# Patient Record
Sex: FEMALE | Race: BLACK OR AFRICAN AMERICAN | Employment: OTHER
[De-identification: names, ages, dates, MRNs, and addresses within clinical notes are randomized per-mention and may not be internally consistent; named-entity substitution may affect disease eponyms.]

---

## 2017-01-03 ENCOUNTER — TELEPHONE (OUTPATIENT)
Dept: ONCOLOGY | Facility: CLINIC | Age: 77
End: 2017-01-03

## 2017-01-10 ENCOUNTER — TELEPHONE (OUTPATIENT)
Dept: ONCOLOGY | Facility: CLINIC | Age: 77
End: 2017-01-10

## 2017-01-17 ENCOUNTER — OFFICE VISIT (OUTPATIENT)
Dept: ONCOLOGY | Facility: CLINIC | Age: 77
End: 2017-01-17

## 2017-01-17 VITALS
HEART RATE: 76 BPM | SYSTOLIC BLOOD PRESSURE: 161 MMHG | TEMPERATURE: 96.6 F | DIASTOLIC BLOOD PRESSURE: 87 MMHG | RESPIRATION RATE: 18 BRPM

## 2017-01-17 DIAGNOSIS — C50.412 MALIGNANT NEOPLASM OF UPPER-OUTER QUADRANT OF LEFT FEMALE BREAST (HCC): Primary | Chronic | ICD-10-CM

## 2017-01-17 PROCEDURE — 99214 OFFICE O/P EST MOD 30 MIN: CPT | Performed by: INTERNAL MEDICINE

## 2017-01-26 ENCOUNTER — TELEPHONE (OUTPATIENT)
Dept: ONCOLOGY | Facility: CLINIC | Age: 77
End: 2017-01-26

## 2017-03-14 ENCOUNTER — HOSPITAL ENCOUNTER (OUTPATIENT)
Facility: MEDICAL CENTER | Age: 77
Discharge: HOME | End: 2017-03-14
Payer: MEDICARE

## 2017-03-14 ENCOUNTER — OFFICE VISIT (OUTPATIENT)
Dept: ONCOLOGY | Age: 77
End: 2017-03-14
Payer: MEDICARE

## 2017-03-14 VITALS
WEIGHT: 162 LBS | SYSTOLIC BLOOD PRESSURE: 163 MMHG | HEART RATE: 66 BPM | DIASTOLIC BLOOD PRESSURE: 90 MMHG | TEMPERATURE: 96.8 F | BODY MASS INDEX: 32.72 KG/M2 | RESPIRATION RATE: 16 BRPM

## 2017-03-14 DIAGNOSIS — C50.412 MALIGNANT NEOPLASM OF UPPER-OUTER QUADRANT OF LEFT FEMALE BREAST (HCC): Primary | Chronic | ICD-10-CM

## 2017-03-14 DIAGNOSIS — Z72.0 TOBACCO ABUSE: Chronic | ICD-10-CM

## 2017-03-14 DIAGNOSIS — Z12.31 ENCOUNTER FOR SCREENING MAMMOGRAM FOR MALIGNANT NEOPLASM OF BREAST: ICD-10-CM

## 2017-03-14 PROCEDURE — 99213 OFFICE O/P EST LOW 20 MIN: CPT | Performed by: INTERNAL MEDICINE

## 2017-03-14 RX ORDER — VARENICLINE TARTRATE 25 MG
KIT ORAL
Qty: 1 EACH | Refills: 0 | Status: SHIPPED | OUTPATIENT
Start: 2017-03-14 | End: 2017-04-13

## 2017-04-13 ENCOUNTER — HOSPITAL ENCOUNTER (OUTPATIENT)
Dept: WOMENS IMAGING | Age: 77
Discharge: HOME | End: 2017-04-13
Payer: MEDICARE

## 2017-04-13 DIAGNOSIS — Z12.31 ENCOUNTER FOR SCREENING MAMMOGRAM FOR MALIGNANT NEOPLASM OF BREAST: ICD-10-CM

## 2017-04-13 DIAGNOSIS — C50.412 MALIGNANT NEOPLASM OF UPPER-OUTER QUADRANT OF LEFT FEMALE BREAST (HCC): Chronic | ICD-10-CM

## 2017-04-13 PROCEDURE — G0202 SCR MAMMO BI INCL CAD: HCPCS

## 2017-06-17 ENCOUNTER — APPOINTMENT (OUTPATIENT)
Dept: GENERAL RADIOLOGY | Age: 77
DRG: 603 | End: 2017-06-17
Payer: MEDICARE

## 2017-06-17 ENCOUNTER — HOSPITAL ENCOUNTER (INPATIENT)
Age: 77
LOS: 3 days | Discharge: HOME | DRG: 603 | End: 2017-06-21
Attending: EMERGENCY MEDICINE | Admitting: INTERNAL MEDICINE
Payer: MEDICARE

## 2017-06-17 DIAGNOSIS — L03.115 CELLULITIS OF RIGHT LOWER EXTREMITY: Primary | ICD-10-CM

## 2017-06-17 LAB
ABSOLUTE BANDS #: 2.39 K/UL (ref 0–1)
ABSOLUTE EOS #: 0 K/UL (ref 0–0.4)
ABSOLUTE LYMPH #: 0.74 K/UL (ref 1–4.8)
ABSOLUTE MONO #: 0.18 K/UL (ref 0.1–1.3)
ANION GAP SERPL CALCULATED.3IONS-SCNC: 16 MMOL/L (ref 9–17)
BANDS: 13 %
BASOPHILS # BLD: 0 %
BASOPHILS ABSOLUTE: 0 K/UL (ref 0–0.2)
BNP INTERPRETATION: ABNORMAL
BUN BLDV-MCNC: 32 MG/DL (ref 8–23)
BUN/CREAT BLD: ABNORMAL (ref 9–20)
CALCIUM SERPL-MCNC: 9 MG/DL (ref 8.6–10.4)
CHLORIDE BLD-SCNC: 97 MMOL/L (ref 98–107)
CO2: 22 MMOL/L (ref 20–31)
CREAT SERPL-MCNC: 1.78 MG/DL (ref 0.5–0.9)
DIFFERENTIAL TYPE: ABNORMAL
EOSINOPHILS RELATIVE PERCENT: 0 %
GFR AFRICAN AMERICAN: 33 ML/MIN
GFR NON-AFRICAN AMERICAN: 28 ML/MIN
GFR SERPL CREATININE-BSD FRML MDRD: ABNORMAL ML/MIN/{1.73_M2}
GFR SERPL CREATININE-BSD FRML MDRD: ABNORMAL ML/MIN/{1.73_M2}
GLUCOSE BLD-MCNC: 100 MG/DL (ref 70–99)
HCT VFR BLD CALC: 38 % (ref 36–46)
HEMOGLOBIN: 12.1 G/DL (ref 12–16)
INR BLD: 1.1
LYMPHOCYTES # BLD: 4 %
MCH RBC QN AUTO: 21.8 PG (ref 26–34)
MCHC RBC AUTO-ENTMCNC: 31.9 G/DL (ref 31–37)
MCV RBC AUTO: 68.5 FL (ref 80–100)
MONOCYTES # BLD: 1 %
MORPHOLOGY: ABNORMAL
PDW BLD-RTO: 18.3 % (ref 11.5–14.9)
PLATELET # BLD: 147 K/UL (ref 150–450)
PLATELET ESTIMATE: ABNORMAL
PMV BLD AUTO: 9.3 FL (ref 6–12)
POTASSIUM SERPL-SCNC: 4.2 MMOL/L (ref 3.7–5.3)
PRO-BNP: 2882 PG/ML
PROTHROMBIN TIME: 11.4 SEC (ref 9.7–12)
RBC # BLD: 5.54 M/UL (ref 4–5.2)
RBC # BLD: ABNORMAL 10*6/UL
SEG NEUTROPHILS: 82 %
SEGMENTED NEUTROPHILS ABSOLUTE COUNT: 15.09 K/UL (ref 1.3–9.1)
SODIUM BLD-SCNC: 135 MMOL/L (ref 135–144)
TROPONIN INTERP: NORMAL
TROPONIN T: <0.03 NG/ML
WBC # BLD: 18.4 K/UL (ref 3.5–11)
WBC # BLD: ABNORMAL 10*3/UL

## 2017-06-17 PROCEDURE — 80048 BASIC METABOLIC PNL TOTAL CA: CPT

## 2017-06-17 PROCEDURE — 87205 SMEAR GRAM STAIN: CPT

## 2017-06-17 PROCEDURE — 85025 COMPLETE CBC W/AUTO DIFF WBC: CPT

## 2017-06-17 PROCEDURE — 93005 ELECTROCARDIOGRAM TRACING: CPT

## 2017-06-17 PROCEDURE — 99285 EMERGENCY DEPT VISIT HI MDM: CPT

## 2017-06-17 PROCEDURE — 86403 PARTICLE AGGLUT ANTBDY SCRN: CPT

## 2017-06-17 PROCEDURE — 93971 EXTREMITY STUDY: CPT

## 2017-06-17 PROCEDURE — 36415 COLL VENOUS BLD VENIPUNCTURE: CPT

## 2017-06-17 PROCEDURE — 83880 ASSAY OF NATRIURETIC PEPTIDE: CPT

## 2017-06-17 PROCEDURE — 81001 URINALYSIS AUTO W/SCOPE: CPT

## 2017-06-17 PROCEDURE — 84484 ASSAY OF TROPONIN QUANT: CPT

## 2017-06-17 PROCEDURE — 71020 XR CHEST STANDARD TWO VW: CPT

## 2017-06-17 PROCEDURE — 85610 PROTHROMBIN TIME: CPT

## 2017-06-17 PROCEDURE — 87040 BLOOD CULTURE FOR BACTERIA: CPT

## 2017-06-17 ASSESSMENT — PAIN DESCRIPTION - ORIENTATION: ORIENTATION: RIGHT

## 2017-06-17 ASSESSMENT — ENCOUNTER SYMPTOMS
NAUSEA: 0
SHORTNESS OF BREATH: 0
SORE THROAT: 0
DIARRHEA: 0
COUGH: 0
EYE REDNESS: 0
RHINORRHEA: 0
VOMITING: 0
EYE DISCHARGE: 0
COLOR CHANGE: 0

## 2017-06-17 ASSESSMENT — PAIN SCALES - GENERAL: PAINLEVEL_OUTOF10: 5

## 2017-06-17 ASSESSMENT — PAIN DESCRIPTION - PAIN TYPE: TYPE: ACUTE PAIN

## 2017-06-17 ASSESSMENT — PAIN DESCRIPTION - LOCATION: LOCATION: LEG

## 2017-06-17 ASSESSMENT — PAIN SCALES - WONG BAKER: WONGBAKER_NUMERICALRESPONSE: 4

## 2017-06-18 LAB
-: ABNORMAL
AMORPHOUS: ABNORMAL
BACTERIA: ABNORMAL
BILIRUBIN URINE: NEGATIVE
CASTS UA: ABNORMAL /LPF
COLOR: YELLOW
COMMENT UA: ABNORMAL
CRYSTALS, UA: ABNORMAL /HPF
EPITHELIAL CELLS UA: ABNORMAL /HPF
GLUCOSE URINE: NEGATIVE
KETONES, URINE: NEGATIVE
LACTIC ACID: 2.6 MMOL/L (ref 0.5–2.2)
LEUKOCYTE ESTERASE, URINE: NEGATIVE
MUCUS: ABNORMAL
NITRITE, URINE: NEGATIVE
OTHER OBSERVATIONS UA: ABNORMAL
PH UA: 5.5 (ref 5–8)
PROTEIN UA: ABNORMAL
RBC UA: ABNORMAL /HPF
RENAL EPITHELIAL, UA: ABNORMAL /HPF
SPECIFIC GRAVITY UA: 1.02 (ref 1–1.03)
TRICHOMONAS: ABNORMAL
TROPONIN INTERP: NORMAL
TROPONIN T: <0.03 NG/ML
TURBIDITY: ABNORMAL
URINE HGB: ABNORMAL
UROBILINOGEN, URINE: NORMAL
WBC UA: ABNORMAL /HPF
YEAST: ABNORMAL

## 2017-06-18 PROCEDURE — 6360000002 HC RX W HCPCS: Performed by: INTERNAL MEDICINE

## 2017-06-18 PROCEDURE — 1200000000 HC SEMI PRIVATE

## 2017-06-18 PROCEDURE — 6360000002 HC RX W HCPCS: Performed by: EMERGENCY MEDICINE

## 2017-06-18 PROCEDURE — 83605 ASSAY OF LACTIC ACID: CPT

## 2017-06-18 PROCEDURE — 36415 COLL VENOUS BLD VENIPUNCTURE: CPT

## 2017-06-18 PROCEDURE — 84484 ASSAY OF TROPONIN QUANT: CPT

## 2017-06-18 PROCEDURE — 6370000000 HC RX 637 (ALT 250 FOR IP): Performed by: INTERNAL MEDICINE

## 2017-06-18 PROCEDURE — 2580000003 HC RX 258: Performed by: EMERGENCY MEDICINE

## 2017-06-18 PROCEDURE — 2580000003 HC RX 258: Performed by: INTERNAL MEDICINE

## 2017-06-18 PROCEDURE — 6370000000 HC RX 637 (ALT 250 FOR IP): Performed by: EMERGENCY MEDICINE

## 2017-06-18 PROCEDURE — 99223 1ST HOSP IP/OBS HIGH 75: CPT | Performed by: INTERNAL MEDICINE

## 2017-06-18 RX ORDER — ACETAMINOPHEN 500 MG
1000 TABLET ORAL ONCE
Status: COMPLETED | OUTPATIENT
Start: 2017-06-18 | End: 2017-06-18

## 2017-06-18 RX ORDER — SODIUM CHLORIDE 0.9 % (FLUSH) 0.9 %
10 SYRINGE (ML) INJECTION PRN
Status: DISCONTINUED | OUTPATIENT
Start: 2017-06-18 | End: 2017-06-21 | Stop reason: HOSPADM

## 2017-06-18 RX ORDER — HYDRALAZINE HYDROCHLORIDE 100 MG/1
100 TABLET, FILM COATED ORAL 3 TIMES DAILY
COMMUNITY
End: 2018-04-23 | Stop reason: SDUPTHER

## 2017-06-18 RX ORDER — DOCUSATE SODIUM 100 MG/1
100 CAPSULE, LIQUID FILLED ORAL 2 TIMES DAILY
Status: DISCONTINUED | OUTPATIENT
Start: 2017-06-18 | End: 2017-06-21 | Stop reason: HOSPADM

## 2017-06-18 RX ORDER — HYDRALAZINE HYDROCHLORIDE 50 MG/1
100 TABLET, FILM COATED ORAL 3 TIMES DAILY
Status: DISCONTINUED | OUTPATIENT
Start: 2017-06-18 | End: 2017-06-21 | Stop reason: HOSPADM

## 2017-06-18 RX ORDER — CARVEDILOL 25 MG/1
25 TABLET ORAL 2 TIMES DAILY WITH MEALS
COMMUNITY
End: 2018-07-16 | Stop reason: SDUPTHER

## 2017-06-18 RX ORDER — SIMVASTATIN 40 MG
40 TABLET ORAL NIGHTLY
COMMUNITY
End: 2019-01-01 | Stop reason: SDUPTHER

## 2017-06-18 RX ORDER — PANTOPRAZOLE SODIUM 40 MG/1
40 TABLET, DELAYED RELEASE ORAL DAILY
Status: DISCONTINUED | OUTPATIENT
Start: 2017-06-18 | End: 2017-06-21 | Stop reason: HOSPADM

## 2017-06-18 RX ORDER — MORPHINE SULFATE 2 MG/ML
1 INJECTION, SOLUTION INTRAMUSCULAR; INTRAVENOUS
Status: DISCONTINUED | OUTPATIENT
Start: 2017-06-18 | End: 2017-06-21 | Stop reason: HOSPADM

## 2017-06-18 RX ORDER — ACETAMINOPHEN 325 MG/1
650 TABLET ORAL EVERY 4 HOURS PRN
Status: DISCONTINUED | OUTPATIENT
Start: 2017-06-18 | End: 2017-06-21 | Stop reason: HOSPADM

## 2017-06-18 RX ORDER — PANTOPRAZOLE SODIUM 40 MG/1
40 TABLET, DELAYED RELEASE ORAL DAILY
Status: ON HOLD | COMMUNITY
End: 2017-06-21 | Stop reason: HOSPADM

## 2017-06-18 RX ORDER — CLOPIDOGREL BISULFATE 75 MG/1
75 TABLET ORAL DAILY
Status: DISCONTINUED | OUTPATIENT
Start: 2017-06-18 | End: 2017-06-21 | Stop reason: HOSPADM

## 2017-06-18 RX ORDER — CARVEDILOL 25 MG/1
25 TABLET ORAL 2 TIMES DAILY WITH MEALS
Status: DISCONTINUED | OUTPATIENT
Start: 2017-06-18 | End: 2017-06-21 | Stop reason: HOSPADM

## 2017-06-18 RX ORDER — SODIUM CHLORIDE 0.9 % (FLUSH) 0.9 %
10 SYRINGE (ML) INJECTION EVERY 12 HOURS SCHEDULED
Status: DISCONTINUED | OUTPATIENT
Start: 2017-06-18 | End: 2017-06-21 | Stop reason: HOSPADM

## 2017-06-18 RX ORDER — SIMVASTATIN 40 MG
40 TABLET ORAL NIGHTLY
Status: DISCONTINUED | OUTPATIENT
Start: 2017-06-18 | End: 2017-06-21 | Stop reason: HOSPADM

## 2017-06-18 RX ORDER — DOCUSATE SODIUM 100 MG/1
100 CAPSULE, LIQUID FILLED ORAL 2 TIMES DAILY
COMMUNITY
End: 2017-07-11 | Stop reason: ALTCHOICE

## 2017-06-18 RX ADMIN — Medication 10 ML: at 21:45

## 2017-06-18 RX ADMIN — ENOXAPARIN SODIUM 30 MG: 30 INJECTION SUBCUTANEOUS at 09:04

## 2017-06-18 RX ADMIN — HYDRALAZINE HYDROCHLORIDE 100 MG: 50 TABLET, FILM COATED ORAL at 16:18

## 2017-06-18 RX ADMIN — SIMVASTATIN 40 MG: 40 TABLET, FILM COATED ORAL at 21:45

## 2017-06-18 RX ADMIN — CLOPIDOGREL BISULFATE 75 MG: 75 TABLET ORAL at 16:18

## 2017-06-18 RX ADMIN — MORPHINE SULFATE 1 MG: 2 INJECTION, SOLUTION INTRAMUSCULAR; INTRAVENOUS at 10:25

## 2017-06-18 RX ADMIN — DOCUSATE SODIUM 100 MG: 100 CAPSULE, LIQUID FILLED ORAL at 21:45

## 2017-06-18 RX ADMIN — MORPHINE SULFATE 1 MG: 2 INJECTION, SOLUTION INTRAMUSCULAR; INTRAVENOUS at 16:35

## 2017-06-18 RX ADMIN — ACETAMINOPHEN 650 MG: 325 TABLET ORAL at 21:47

## 2017-06-18 RX ADMIN — VANCOMYCIN HYDROCHLORIDE 1000 MG: 1 INJECTION, POWDER, LYOPHILIZED, FOR SOLUTION INTRAVENOUS at 00:29

## 2017-06-18 RX ADMIN — HYDRALAZINE HYDROCHLORIDE 100 MG: 50 TABLET, FILM COATED ORAL at 21:45

## 2017-06-18 RX ADMIN — ACETAMINOPHEN 1000 MG: 500 TABLET ORAL at 01:20

## 2017-06-18 RX ADMIN — CARVEDILOL 25 MG: 25 TABLET, FILM COATED ORAL at 16:18

## 2017-06-18 RX ADMIN — Medication 10 ML: at 09:05

## 2017-06-18 RX ADMIN — PANTOPRAZOLE SODIUM 40 MG: 40 TABLET, DELAYED RELEASE ORAL at 16:18

## 2017-06-18 ASSESSMENT — PAIN DESCRIPTION - PAIN TYPE: TYPE: ACUTE PAIN

## 2017-06-18 ASSESSMENT — PAIN SCALES - GENERAL
PAINLEVEL_OUTOF10: 4
PAINLEVEL_OUTOF10: 10
PAINLEVEL_OUTOF10: 10
PAINLEVEL_OUTOF10: 0
PAINLEVEL_OUTOF10: 6

## 2017-06-18 ASSESSMENT — PAIN DESCRIPTION - LOCATION: LOCATION: LEG

## 2017-06-18 ASSESSMENT — PAIN DESCRIPTION - ORIENTATION: ORIENTATION: RIGHT;LOWER

## 2017-06-19 LAB
ANION GAP SERPL CALCULATED.3IONS-SCNC: 13 MMOL/L (ref 9–17)
BUN BLDV-MCNC: 25 MG/DL (ref 8–23)
BUN/CREAT BLD: ABNORMAL (ref 9–20)
CALCIUM SERPL-MCNC: 8.5 MG/DL (ref 8.6–10.4)
CHLORIDE BLD-SCNC: 102 MMOL/L (ref 98–107)
CO2: 22 MMOL/L (ref 20–31)
CREAT SERPL-MCNC: 1.22 MG/DL (ref 0.5–0.9)
GFR AFRICAN AMERICAN: 52 ML/MIN
GFR NON-AFRICAN AMERICAN: 43 ML/MIN
GFR SERPL CREATININE-BSD FRML MDRD: ABNORMAL ML/MIN/{1.73_M2}
GFR SERPL CREATININE-BSD FRML MDRD: ABNORMAL ML/MIN/{1.73_M2}
GLUCOSE BLD-MCNC: 89 MG/DL (ref 70–99)
HCT VFR BLD CALC: 33.7 % (ref 36–46)
HEMOGLOBIN: 10.6 G/DL (ref 12–16)
LACTIC ACID: 1.6 MMOL/L (ref 0.5–2.2)
MCH RBC QN AUTO: 21.4 PG (ref 26–34)
MCHC RBC AUTO-ENTMCNC: 31.3 G/DL (ref 31–37)
MCV RBC AUTO: 68.3 FL (ref 80–100)
PDW BLD-RTO: 17.8 % (ref 11.5–14.9)
PLATELET # BLD: 122 K/UL (ref 150–450)
PMV BLD AUTO: 9.1 FL (ref 6–12)
POTASSIUM SERPL-SCNC: 4.4 MMOL/L (ref 3.7–5.3)
RBC # BLD: 4.94 M/UL (ref 4–5.2)
SODIUM BLD-SCNC: 137 MMOL/L (ref 135–144)
WBC # BLD: 11.8 K/UL (ref 3.5–11)

## 2017-06-19 PROCEDURE — 80048 BASIC METABOLIC PNL TOTAL CA: CPT

## 2017-06-19 PROCEDURE — 2580000003 HC RX 258: Performed by: INTERNAL MEDICINE

## 2017-06-19 PROCEDURE — 36415 COLL VENOUS BLD VENIPUNCTURE: CPT

## 2017-06-19 PROCEDURE — 6370000000 HC RX 637 (ALT 250 FOR IP): Performed by: INTERNAL MEDICINE

## 2017-06-19 PROCEDURE — 99232 SBSQ HOSP IP/OBS MODERATE 35: CPT | Performed by: INTERNAL MEDICINE

## 2017-06-19 PROCEDURE — 83605 ASSAY OF LACTIC ACID: CPT

## 2017-06-19 PROCEDURE — 1200000000 HC SEMI PRIVATE

## 2017-06-19 PROCEDURE — 85027 COMPLETE CBC AUTOMATED: CPT

## 2017-06-19 PROCEDURE — 6360000002 HC RX W HCPCS: Performed by: EMERGENCY MEDICINE

## 2017-06-19 PROCEDURE — 2580000003 HC RX 258: Performed by: EMERGENCY MEDICINE

## 2017-06-19 RX ORDER — HYDROCODONE BITARTRATE AND ACETAMINOPHEN 5; 325 MG/1; MG/1
1 TABLET ORAL EVERY 4 HOURS PRN
Status: DISCONTINUED | OUTPATIENT
Start: 2017-06-19 | End: 2017-06-21 | Stop reason: HOSPADM

## 2017-06-19 RX ADMIN — HYDRALAZINE HYDROCHLORIDE 100 MG: 50 TABLET, FILM COATED ORAL at 09:47

## 2017-06-19 RX ADMIN — CARVEDILOL 25 MG: 25 TABLET, FILM COATED ORAL at 09:45

## 2017-06-19 RX ADMIN — Medication 10 ML: at 20:54

## 2017-06-19 RX ADMIN — Medication 10 ML: at 09:46

## 2017-06-19 RX ADMIN — HYDROCODONE BITARTRATE AND ACETAMINOPHEN 1 TABLET: 5; 325 TABLET ORAL at 16:50

## 2017-06-19 RX ADMIN — ACETAMINOPHEN 650 MG: 325 TABLET ORAL at 10:06

## 2017-06-19 RX ADMIN — CLOPIDOGREL BISULFATE 75 MG: 75 TABLET ORAL at 09:45

## 2017-06-19 RX ADMIN — HYDRALAZINE HYDROCHLORIDE 100 MG: 50 TABLET, FILM COATED ORAL at 20:53

## 2017-06-19 RX ADMIN — PANTOPRAZOLE SODIUM 40 MG: 40 TABLET, DELAYED RELEASE ORAL at 09:45

## 2017-06-19 RX ADMIN — DOCUSATE SODIUM 100 MG: 100 CAPSULE, LIQUID FILLED ORAL at 20:54

## 2017-06-19 RX ADMIN — SIMVASTATIN 40 MG: 40 TABLET, FILM COATED ORAL at 20:53

## 2017-06-19 RX ADMIN — CARVEDILOL 25 MG: 25 TABLET, FILM COATED ORAL at 16:49

## 2017-06-19 RX ADMIN — DOCUSATE SODIUM 100 MG: 100 CAPSULE, LIQUID FILLED ORAL at 09:45

## 2017-06-19 RX ADMIN — VANCOMYCIN HYDROCHLORIDE 1000 MG: 1 INJECTION, POWDER, LYOPHILIZED, FOR SOLUTION INTRAVENOUS at 12:47

## 2017-06-19 ASSESSMENT — PAIN DESCRIPTION - ORIENTATION
ORIENTATION: RIGHT
ORIENTATION: RIGHT

## 2017-06-19 ASSESSMENT — PAIN DESCRIPTION - LOCATION
LOCATION: LEG
LOCATION: LEG

## 2017-06-19 ASSESSMENT — PAIN DESCRIPTION - PAIN TYPE
TYPE: CHRONIC PAIN
TYPE: ACUTE PAIN;CHRONIC PAIN

## 2017-06-19 ASSESSMENT — PAIN SCALES - GENERAL
PAINLEVEL_OUTOF10: 10
PAINLEVEL_OUTOF10: 10
PAINLEVEL_OUTOF10: 0
PAINLEVEL_OUTOF10: 5

## 2017-06-20 LAB
ANION GAP SERPL CALCULATED.3IONS-SCNC: 14 MMOL/L (ref 9–17)
BUN BLDV-MCNC: 19 MG/DL (ref 8–23)
BUN/CREAT BLD: ABNORMAL (ref 9–20)
CALCIUM SERPL-MCNC: 8.2 MG/DL (ref 8.6–10.4)
CHLORIDE BLD-SCNC: 103 MMOL/L (ref 98–107)
CO2: 21 MMOL/L (ref 20–31)
CREAT SERPL-MCNC: 1.15 MG/DL (ref 0.5–0.9)
CULTURE: ABNORMAL
GFR AFRICAN AMERICAN: 55 ML/MIN
GFR NON-AFRICAN AMERICAN: 46 ML/MIN
GFR SERPL CREATININE-BSD FRML MDRD: ABNORMAL ML/MIN/{1.73_M2}
GFR SERPL CREATININE-BSD FRML MDRD: ABNORMAL ML/MIN/{1.73_M2}
GLUCOSE BLD-MCNC: 94 MG/DL (ref 70–99)
HCT VFR BLD CALC: 30.5 % (ref 36–46)
HEMOGLOBIN: 9.6 G/DL (ref 12–16)
Lab: ABNORMAL
Lab: ABNORMAL
MCH RBC QN AUTO: 21.5 PG (ref 26–34)
MCHC RBC AUTO-ENTMCNC: 31.6 G/DL (ref 31–37)
MCV RBC AUTO: 67.9 FL (ref 80–100)
PDW BLD-RTO: 18 % (ref 11.5–14.9)
PLATELET # BLD: 132 K/UL (ref 150–450)
PMV BLD AUTO: 9.5 FL (ref 6–12)
POTASSIUM SERPL-SCNC: 3.5 MMOL/L (ref 3.7–5.3)
RBC # BLD: 4.49 M/UL (ref 4–5.2)
SODIUM BLD-SCNC: 138 MMOL/L (ref 135–144)
SPECIMEN DESCRIPTION: ABNORMAL
SPECIMEN DESCRIPTION: ABNORMAL
STATUS: ABNORMAL
WBC # BLD: 8.5 K/UL (ref 3.5–11)

## 2017-06-20 PROCEDURE — 1200000000 HC SEMI PRIVATE

## 2017-06-20 PROCEDURE — 6370000000 HC RX 637 (ALT 250 FOR IP): Performed by: INTERNAL MEDICINE

## 2017-06-20 PROCEDURE — 2580000003 HC RX 258: Performed by: INTERNAL MEDICINE

## 2017-06-20 PROCEDURE — 36415 COLL VENOUS BLD VENIPUNCTURE: CPT

## 2017-06-20 PROCEDURE — 99232 SBSQ HOSP IP/OBS MODERATE 35: CPT | Performed by: INTERNAL MEDICINE

## 2017-06-20 PROCEDURE — 85027 COMPLETE CBC AUTOMATED: CPT

## 2017-06-20 PROCEDURE — 80048 BASIC METABOLIC PNL TOTAL CA: CPT

## 2017-06-20 RX ADMIN — HYDRALAZINE HYDROCHLORIDE 100 MG: 50 TABLET, FILM COATED ORAL at 09:31

## 2017-06-20 RX ADMIN — DOCUSATE SODIUM 100 MG: 100 CAPSULE, LIQUID FILLED ORAL at 09:31

## 2017-06-20 RX ADMIN — CARVEDILOL 25 MG: 25 TABLET, FILM COATED ORAL at 17:58

## 2017-06-20 RX ADMIN — SIMVASTATIN 40 MG: 40 TABLET, FILM COATED ORAL at 23:15

## 2017-06-20 RX ADMIN — Medication 10 ML: at 23:15

## 2017-06-20 RX ADMIN — CARVEDILOL 25 MG: 25 TABLET, FILM COATED ORAL at 09:31

## 2017-06-20 RX ADMIN — Medication 10 ML: at 09:32

## 2017-06-20 RX ADMIN — CLOPIDOGREL BISULFATE 75 MG: 75 TABLET ORAL at 09:31

## 2017-06-20 RX ADMIN — HYDROCODONE BITARTRATE AND ACETAMINOPHEN 1 TABLET: 5; 325 TABLET ORAL at 06:41

## 2017-06-20 RX ADMIN — HYDRALAZINE HYDROCHLORIDE 100 MG: 50 TABLET, FILM COATED ORAL at 23:15

## 2017-06-20 RX ADMIN — HYDROCODONE BITARTRATE AND ACETAMINOPHEN 1 TABLET: 5; 325 TABLET ORAL at 14:16

## 2017-06-20 RX ADMIN — HYDRALAZINE HYDROCHLORIDE 100 MG: 50 TABLET, FILM COATED ORAL at 14:12

## 2017-06-20 RX ADMIN — HYDROCODONE BITARTRATE AND ACETAMINOPHEN 1 TABLET: 5; 325 TABLET ORAL at 00:15

## 2017-06-20 RX ADMIN — PANTOPRAZOLE SODIUM 40 MG: 40 TABLET, DELAYED RELEASE ORAL at 09:31

## 2017-06-20 ASSESSMENT — PAIN SCALES - GENERAL
PAINLEVEL_OUTOF10: 7
PAINLEVEL_OUTOF10: 0
PAINLEVEL_OUTOF10: 8
PAINLEVEL_OUTOF10: 10
PAINLEVEL_OUTOF10: 5
PAINLEVEL_OUTOF10: 10

## 2017-06-21 VITALS
HEIGHT: 60 IN | DIASTOLIC BLOOD PRESSURE: 56 MMHG | HEART RATE: 63 BPM | SYSTOLIC BLOOD PRESSURE: 134 MMHG | RESPIRATION RATE: 20 BRPM | OXYGEN SATURATION: 100 % | TEMPERATURE: 98.6 F | BODY MASS INDEX: 31.34 KG/M2 | WEIGHT: 159.61 LBS

## 2017-06-21 LAB
ANION GAP SERPL CALCULATED.3IONS-SCNC: 12 MMOL/L (ref 9–17)
BUN BLDV-MCNC: 16 MG/DL (ref 8–23)
BUN/CREAT BLD: ABNORMAL (ref 9–20)
CALCIUM SERPL-MCNC: 8.2 MG/DL (ref 8.6–10.4)
CHLORIDE BLD-SCNC: 102 MMOL/L (ref 98–107)
CO2: 23 MMOL/L (ref 20–31)
CREAT SERPL-MCNC: 1 MG/DL (ref 0.5–0.9)
GFR AFRICAN AMERICAN: >60 ML/MIN
GFR NON-AFRICAN AMERICAN: 54 ML/MIN
GFR SERPL CREATININE-BSD FRML MDRD: ABNORMAL ML/MIN/{1.73_M2}
GFR SERPL CREATININE-BSD FRML MDRD: ABNORMAL ML/MIN/{1.73_M2}
GLUCOSE BLD-MCNC: 102 MG/DL (ref 70–99)
HCT VFR BLD CALC: 30.6 % (ref 36–46)
HEMOGLOBIN: 10 G/DL (ref 12–16)
MCH RBC QN AUTO: 22.3 PG (ref 26–34)
MCHC RBC AUTO-ENTMCNC: 32.6 G/DL (ref 31–37)
MCV RBC AUTO: 68.3 FL (ref 80–100)
PDW BLD-RTO: 18 % (ref 11.5–14.9)
PLATELET # BLD: 149 K/UL (ref 150–450)
PMV BLD AUTO: 9.4 FL (ref 6–12)
POTASSIUM SERPL-SCNC: 3.6 MMOL/L (ref 3.7–5.3)
RBC # BLD: 4.48 M/UL (ref 4–5.2)
SODIUM BLD-SCNC: 137 MMOL/L (ref 135–144)
VANCOMYCIN TROUGH DATE LAST DOSE: ABNORMAL
VANCOMYCIN TROUGH DOSE AMOUNT: ABNORMAL
VANCOMYCIN TROUGH TIME LAST DOSE: 1247
VANCOMYCIN TROUGH: 8.1 UG/ML (ref 10–20)
WBC # BLD: 6.2 K/UL (ref 3.5–11)

## 2017-06-21 PROCEDURE — 36415 COLL VENOUS BLD VENIPUNCTURE: CPT

## 2017-06-21 PROCEDURE — 6370000000 HC RX 637 (ALT 250 FOR IP): Performed by: INTERNAL MEDICINE

## 2017-06-21 PROCEDURE — 6360000002 HC RX W HCPCS: Performed by: EMERGENCY MEDICINE

## 2017-06-21 PROCEDURE — 80048 BASIC METABOLIC PNL TOTAL CA: CPT

## 2017-06-21 PROCEDURE — 85027 COMPLETE CBC AUTOMATED: CPT

## 2017-06-21 PROCEDURE — 80202 ASSAY OF VANCOMYCIN: CPT

## 2017-06-21 PROCEDURE — 2580000003 HC RX 258: Performed by: EMERGENCY MEDICINE

## 2017-06-21 PROCEDURE — 99239 HOSP IP/OBS DSCHRG MGMT >30: CPT | Performed by: INTERNAL MEDICINE

## 2017-06-21 PROCEDURE — 2580000003 HC RX 258: Performed by: INTERNAL MEDICINE

## 2017-06-21 RX ORDER — PANTOPRAZOLE SODIUM 40 MG/1
40 TABLET, DELAYED RELEASE ORAL DAILY
Qty: 30 TABLET | Refills: 3 | Status: SHIPPED | OUTPATIENT
Start: 2017-06-21 | End: 2017-06-21

## 2017-06-21 RX ORDER — DOXYCYCLINE HYCLATE 100 MG
100 TABLET ORAL 2 TIMES DAILY
Qty: 20 TABLET | Refills: 0 | Status: ON HOLD | OUTPATIENT
Start: 2017-06-21 | End: 2017-07-04 | Stop reason: HOSPADM

## 2017-06-21 RX ORDER — DOXYCYCLINE HYCLATE 100 MG
100 TABLET ORAL 2 TIMES DAILY
Qty: 20 TABLET | Refills: 0 | Status: SHIPPED | OUTPATIENT
Start: 2017-06-21 | End: 2017-06-21

## 2017-06-21 RX ORDER — PANTOPRAZOLE SODIUM 40 MG/1
40 TABLET, DELAYED RELEASE ORAL DAILY
Qty: 30 TABLET | Refills: 3 | Status: SHIPPED | OUTPATIENT
Start: 2017-06-21 | End: 2018-04-23 | Stop reason: SDUPTHER

## 2017-06-21 RX ORDER — HYDROCODONE BITARTRATE AND ACETAMINOPHEN 5; 325 MG/1; MG/1
1 TABLET ORAL EVERY 6 HOURS PRN
Qty: 15 TABLET | Refills: 0 | Status: SHIPPED | OUTPATIENT
Start: 2017-06-21 | End: 2017-06-26

## 2017-06-21 RX ADMIN — PANTOPRAZOLE SODIUM 40 MG: 40 TABLET, DELAYED RELEASE ORAL at 08:51

## 2017-06-21 RX ADMIN — ACETAMINOPHEN 325 MG: 325 TABLET ORAL at 08:51

## 2017-06-21 RX ADMIN — ENOXAPARIN SODIUM 30 MG: 30 INJECTION SUBCUTANEOUS at 08:53

## 2017-06-21 RX ADMIN — Medication 10 ML: at 08:55

## 2017-06-21 RX ADMIN — CLOPIDOGREL BISULFATE 75 MG: 75 TABLET ORAL at 08:51

## 2017-06-21 RX ADMIN — DEXTROSE MONOHYDRATE 1000 MG: 50 INJECTION, SOLUTION INTRAVENOUS at 01:30

## 2017-06-21 RX ADMIN — HYDROCODONE BITARTRATE AND ACETAMINOPHEN 1 TABLET: 5; 325 TABLET ORAL at 08:52

## 2017-06-21 RX ADMIN — CARVEDILOL 25 MG: 25 TABLET, FILM COATED ORAL at 08:55

## 2017-06-21 RX ADMIN — HYDRALAZINE HYDROCHLORIDE 100 MG: 50 TABLET, FILM COATED ORAL at 08:51

## 2017-06-21 ASSESSMENT — PAIN SCALES - GENERAL: PAINLEVEL_OUTOF10: 6

## 2017-06-22 LAB
EKG ATRIAL RATE: 95 BPM
EKG P AXIS: 64 DEGREES
EKG P-R INTERVAL: 176 MS
EKG Q-T INTERVAL: 354 MS
EKG QRS DURATION: 72 MS
EKG QTC CALCULATION (BAZETT): 444 MS
EKG R AXIS: 15 DEGREES
EKG T AXIS: 57 DEGREES
EKG VENTRICULAR RATE: 95 BPM

## 2017-06-23 LAB
CULTURE: NORMAL
CULTURE: NORMAL
Lab: NORMAL
Lab: NORMAL
SPECIMEN DESCRIPTION: NORMAL
SPECIMEN DESCRIPTION: NORMAL
STATUS: NORMAL

## 2017-06-28 ENCOUNTER — HOSPITAL ENCOUNTER (INPATIENT)
Age: 77
LOS: 6 days | Discharge: HOME HEALTH | DRG: 603 | End: 2017-07-04
Attending: EMERGENCY MEDICINE | Admitting: FAMILY MEDICINE
Payer: MEDICARE

## 2017-06-28 DIAGNOSIS — L03.90 CELLULITIS, UNSPECIFIED CELLULITIS SITE: Primary | ICD-10-CM

## 2017-06-28 PROBLEM — L03.115 CELLULITIS OF RIGHT LOWER EXTREMITY: Status: ACTIVE | Noted: 2017-06-28

## 2017-06-28 PROBLEM — D64.9 ANEMIA: Status: ACTIVE | Noted: 2017-06-28

## 2017-06-28 LAB
ABSOLUTE EOS #: 0.15 K/UL (ref 0–0.4)
ABSOLUTE LYMPH #: 1.26 K/UL (ref 1–4.8)
ABSOLUTE MONO #: 0.52 K/UL (ref 0.1–1.3)
ANION GAP SERPL CALCULATED.3IONS-SCNC: 12 MMOL/L (ref 9–17)
BASOPHILS # BLD: 0 %
BASOPHILS ABSOLUTE: 0 K/UL (ref 0–0.2)
BUN BLDV-MCNC: 16 MG/DL (ref 8–23)
BUN/CREAT BLD: ABNORMAL (ref 9–20)
CALCIUM SERPL-MCNC: 8.8 MG/DL (ref 8.6–10.4)
CHLORIDE BLD-SCNC: 105 MMOL/L (ref 98–107)
CO2: 24 MMOL/L (ref 20–31)
CREAT SERPL-MCNC: 1.1 MG/DL (ref 0.5–0.9)
DIFFERENTIAL TYPE: ABNORMAL
EOSINOPHILS RELATIVE PERCENT: 2 %
GFR AFRICAN AMERICAN: 58 ML/MIN
GFR NON-AFRICAN AMERICAN: 48 ML/MIN
GFR SERPL CREATININE-BSD FRML MDRD: ABNORMAL ML/MIN/{1.73_M2}
GFR SERPL CREATININE-BSD FRML MDRD: ABNORMAL ML/MIN/{1.73_M2}
GLUCOSE BLD-MCNC: 108 MG/DL (ref 70–99)
HCT VFR BLD CALC: 31.3 % (ref 36–46)
HEMOGLOBIN: 10 G/DL (ref 12–16)
LYMPHOCYTES # BLD: 17 %
MCH RBC QN AUTO: 21.6 PG (ref 26–34)
MCHC RBC AUTO-ENTMCNC: 31.9 G/DL (ref 31–37)
MCV RBC AUTO: 67.5 FL (ref 80–100)
MONOCYTES # BLD: 7 %
MORPHOLOGY: ABNORMAL
PDW BLD-RTO: 17.2 % (ref 11.5–14.9)
PLATELET # BLD: 460 K/UL (ref 150–450)
PLATELET ESTIMATE: ABNORMAL
PMV BLD AUTO: 8.3 FL (ref 6–12)
POTASSIUM SERPL-SCNC: 4.8 MMOL/L (ref 3.7–5.3)
RBC # BLD: 4.64 M/UL (ref 4–5.2)
RBC # BLD: ABNORMAL 10*6/UL
SEG NEUTROPHILS: 74 %
SEGMENTED NEUTROPHILS ABSOLUTE COUNT: 5.47 K/UL (ref 1.3–9.1)
SODIUM BLD-SCNC: 141 MMOL/L (ref 135–144)
WBC # BLD: 7.4 K/UL (ref 3.5–11)
WBC # BLD: ABNORMAL 10*3/UL

## 2017-06-28 PROCEDURE — 99284 EMERGENCY DEPT VISIT MOD MDM: CPT

## 2017-06-28 PROCEDURE — 6370000000 HC RX 637 (ALT 250 FOR IP): Performed by: FAMILY MEDICINE

## 2017-06-28 PROCEDURE — 80048 BASIC METABOLIC PNL TOTAL CA: CPT

## 2017-06-28 PROCEDURE — 2580000003 HC RX 258: Performed by: FAMILY MEDICINE

## 2017-06-28 PROCEDURE — 6360000002 HC RX W HCPCS: Performed by: EMERGENCY MEDICINE

## 2017-06-28 PROCEDURE — 96365 THER/PROPH/DIAG IV INF INIT: CPT

## 2017-06-28 PROCEDURE — 6360000002 HC RX W HCPCS: Performed by: FAMILY MEDICINE

## 2017-06-28 PROCEDURE — 96375 TX/PRO/DX INJ NEW DRUG ADDON: CPT

## 2017-06-28 PROCEDURE — 85025 COMPLETE CBC W/AUTO DIFF WBC: CPT

## 2017-06-28 PROCEDURE — 2580000003 HC RX 258: Performed by: EMERGENCY MEDICINE

## 2017-06-28 PROCEDURE — 36415 COLL VENOUS BLD VENIPUNCTURE: CPT

## 2017-06-28 PROCEDURE — G0378 HOSPITAL OBSERVATION PER HR: HCPCS

## 2017-06-28 PROCEDURE — 1200000000 HC SEMI PRIVATE

## 2017-06-28 RX ORDER — HYDRALAZINE HYDROCHLORIDE 50 MG/1
100 TABLET, FILM COATED ORAL 3 TIMES DAILY
Status: DISCONTINUED | OUTPATIENT
Start: 2017-06-28 | End: 2017-07-04 | Stop reason: HOSPADM

## 2017-06-28 RX ORDER — ACETAMINOPHEN AND CODEINE PHOSPHATE 120; 12 MG/5ML; MG/5ML
10 SOLUTION ORAL EVERY 8 HOURS PRN
Status: DISCONTINUED | OUTPATIENT
Start: 2017-06-28 | End: 2017-07-04 | Stop reason: HOSPADM

## 2017-06-28 RX ORDER — MORPHINE SULFATE 4 MG/ML
4 INJECTION, SOLUTION INTRAMUSCULAR; INTRAVENOUS ONCE
Status: COMPLETED | OUTPATIENT
Start: 2017-06-28 | End: 2017-06-28

## 2017-06-28 RX ORDER — PANTOPRAZOLE SODIUM 40 MG/1
40 TABLET, DELAYED RELEASE ORAL DAILY
Status: DISCONTINUED | OUTPATIENT
Start: 2017-06-28 | End: 2017-07-04 | Stop reason: HOSPADM

## 2017-06-28 RX ORDER — SODIUM CHLORIDE 0.9 % (FLUSH) 0.9 %
10 SYRINGE (ML) INJECTION PRN
Status: DISCONTINUED | OUTPATIENT
Start: 2017-06-28 | End: 2017-07-04 | Stop reason: HOSPADM

## 2017-06-28 RX ORDER — SODIUM CHLORIDE 0.9 % (FLUSH) 0.9 %
10 SYRINGE (ML) INJECTION EVERY 12 HOURS SCHEDULED
Status: DISCONTINUED | OUTPATIENT
Start: 2017-06-28 | End: 2017-07-04 | Stop reason: HOSPADM

## 2017-06-28 RX ORDER — CLOPIDOGREL BISULFATE 75 MG/1
75 TABLET ORAL DAILY
Status: DISCONTINUED | OUTPATIENT
Start: 2017-06-28 | End: 2017-07-04 | Stop reason: HOSPADM

## 2017-06-28 RX ORDER — SIMVASTATIN 40 MG
40 TABLET ORAL NIGHTLY
Status: DISCONTINUED | OUTPATIENT
Start: 2017-06-28 | End: 2017-07-04 | Stop reason: HOSPADM

## 2017-06-28 RX ORDER — DOCUSATE SODIUM 100 MG/1
100 CAPSULE, LIQUID FILLED ORAL 2 TIMES DAILY
Status: DISCONTINUED | OUTPATIENT
Start: 2017-06-28 | End: 2017-07-04 | Stop reason: HOSPADM

## 2017-06-28 RX ORDER — FUROSEMIDE 20 MG/1
20 TABLET ORAL 2 TIMES DAILY
Status: DISCONTINUED | OUTPATIENT
Start: 2017-06-28 | End: 2017-07-04 | Stop reason: HOSPADM

## 2017-06-28 RX ORDER — OXYCODONE HYDROCHLORIDE AND ACETAMINOPHEN 5; 325 MG/1; MG/1
1 TABLET ORAL EVERY 4 HOURS PRN
Status: DISCONTINUED | OUTPATIENT
Start: 2017-06-28 | End: 2017-07-04 | Stop reason: HOSPADM

## 2017-06-28 RX ORDER — OXYCODONE HYDROCHLORIDE AND ACETAMINOPHEN 5; 325 MG/1; MG/1
2 TABLET ORAL EVERY 4 HOURS PRN
Status: DISCONTINUED | OUTPATIENT
Start: 2017-06-28 | End: 2017-07-04 | Stop reason: HOSPADM

## 2017-06-28 RX ORDER — CARVEDILOL 25 MG/1
25 TABLET ORAL 2 TIMES DAILY WITH MEALS
Status: DISCONTINUED | OUTPATIENT
Start: 2017-06-28 | End: 2017-07-04 | Stop reason: HOSPADM

## 2017-06-28 RX ORDER — ACETAMINOPHEN 325 MG/1
650 TABLET ORAL EVERY 4 HOURS PRN
Status: DISCONTINUED | OUTPATIENT
Start: 2017-06-28 | End: 2017-07-04 | Stop reason: HOSPADM

## 2017-06-28 RX ORDER — ONDANSETRON 2 MG/ML
4 INJECTION INTRAMUSCULAR; INTRAVENOUS EVERY 4 HOURS PRN
Status: DISCONTINUED | OUTPATIENT
Start: 2017-06-28 | End: 2017-07-04 | Stop reason: HOSPADM

## 2017-06-28 RX ADMIN — CLOPIDOGREL BISULFATE 75 MG: 75 TABLET ORAL at 18:14

## 2017-06-28 RX ADMIN — VANCOMYCIN HYDROCHLORIDE 1000 MG: 1 INJECTION, POWDER, LYOPHILIZED, FOR SOLUTION INTRAVENOUS at 14:00

## 2017-06-28 RX ADMIN — DOCUSATE SODIUM 100 MG: 100 CAPSULE, LIQUID FILLED ORAL at 20:06

## 2017-06-28 RX ADMIN — HYDRALAZINE HYDROCHLORIDE 100 MG: 50 TABLET, FILM COATED ORAL at 20:06

## 2017-06-28 RX ADMIN — FUROSEMIDE 20 MG: 20 TABLET ORAL at 20:06

## 2017-06-28 RX ADMIN — Medication 10 ML: at 20:13

## 2017-06-28 RX ADMIN — CARVEDILOL 25 MG: 25 TABLET, FILM COATED ORAL at 18:14

## 2017-06-28 RX ADMIN — MORPHINE SULFATE 4 MG: 4 INJECTION, SOLUTION INTRAMUSCULAR; INTRAVENOUS at 12:51

## 2017-06-28 RX ADMIN — ENOXAPARIN SODIUM 40 MG: 40 INJECTION SUBCUTANEOUS at 17:21

## 2017-06-28 RX ADMIN — SIMVASTATIN 40 MG: 40 TABLET, FILM COATED ORAL at 20:06

## 2017-06-28 RX ADMIN — OXYCODONE HYDROCHLORIDE AND ACETAMINOPHEN 2 TABLET: 5; 325 TABLET ORAL at 17:19

## 2017-06-28 RX ADMIN — PANTOPRAZOLE SODIUM 40 MG: 40 TABLET, DELAYED RELEASE ORAL at 18:14

## 2017-06-28 ASSESSMENT — ENCOUNTER SYMPTOMS
EYE PAIN: 0
EYE DISCHARGE: 0
NAUSEA: 0
TROUBLE SWALLOWING: 0
VOMITING: 0
COUGH: 0
ABDOMINAL PAIN: 0
SHORTNESS OF BREATH: 0
PHOTOPHOBIA: 0
DIARRHEA: 0
RHINORRHEA: 0
SORE THROAT: 0

## 2017-06-28 ASSESSMENT — PAIN DESCRIPTION - DESCRIPTORS
DESCRIPTORS: BURNING;THROBBING;TIGHTNESS
DESCRIPTORS: BURNING;THROBBING

## 2017-06-28 ASSESSMENT — PAIN SCALES - GENERAL
PAINLEVEL_OUTOF10: 10
PAINLEVEL_OUTOF10: 7

## 2017-06-28 ASSESSMENT — PAIN DESCRIPTION - PAIN TYPE: TYPE: ACUTE PAIN

## 2017-06-28 ASSESSMENT — PAIN DESCRIPTION - LOCATION
LOCATION: LEG
LOCATION: LEG

## 2017-06-28 ASSESSMENT — PAIN DESCRIPTION - ORIENTATION
ORIENTATION: RIGHT
ORIENTATION: RIGHT

## 2017-06-28 ASSESSMENT — PAIN DESCRIPTION - FREQUENCY: FREQUENCY: CONTINUOUS

## 2017-06-28 NOTE — PROGRESS NOTES
ADMISSION NOTE       Patient admitted to room  2050. Time of admit:  1530    Admit from:  [facility]    Reason for admission:  Cellulitis     Where patient has been residing for the last 24 hrs:  Private residence     Has the patient been admitted to any facility in the last 4 weeks, which one:  Yes, [facility]    Family at bedside:  no    Patient is currently resting in bed, vitals obtained, no distress noted. Patient has been oriented to room, educated on how to use call light, and to call for assistance prior to getting up. Bed in lowest and locked position. 2 siderails up for safety. Call light within reach.

## 2017-06-28 NOTE — IP AVS SNAPSHOT
Patient Information     Patient Name Christiano Yang 1940         This is your updated medication list to keep with you all times      TAKE these medications     acetaminophen-codeine 120-12 MG/5ML suspension   Commonly known as:  CAPITAL/CODEINE   Take 10 mLs by mouth every 8 hours as needed for Pain       carvedilol 25 MG tablet   Commonly known as:  COREG       clopidogrel 75 MG tablet   Commonly known as:  PLAVIX   1 tab once daily       diclofenac sodium 1 % Gel   Commonly known as:  VOLTAREN   Apply 2 g topically 2 times daily       docusate sodium 100 MG capsule   Commonly known as:  COLACE       furosemide 20 MG tablet   Commonly known as:  LASIX   Take 1 tablet by mouth 2 times daily       hydrALAZINE 100 MG tablet   Commonly known as:  APRESOLINE       linezolid 600 MG tablet   Commonly known as:  ZYVOX   Take 1 tablet by mouth every 12 hours for 10 days       pantoprazole 40 MG tablet   Commonly known as:  PROTONIX   Take 1 tablet by mouth daily       simvastatin 40 MG tablet   Commonly known as:  ZOCOR

## 2017-06-28 NOTE — IP AVS SNAPSHOT
100 mg on 7/1/2017  9:00 PM     TOMORROW                       hydrALAZINE 100 MG tablet   Commonly known as:  APRESOLINE   Take 100 mg by mouth 3 times daily                100 mg on 7/4/2017  8:23 AM     TONIGHT                       pantoprazole 40 MG tablet   Commonly known as:  PROTONIX   Take 1 tablet by mouth daily                40 mg on 7/4/2017  8:23 AM     TOMORROW                       simvastatin 40 MG tablet   Commonly known as:  ZOCOR   Take 40 mg by mouth nightly                40 mg on 7/3/2017  9:31 PM     TONIGHT                         These are the medications you have told us you were taking at home, STOP taking them after you leave the hospital     doxycycline hyclate 100 MG tablet   Commonly known as:  VIBRA-TABS            Where to Get Your Medications      These medications were sent to [facility] [phone]  f [phone]  [address] [place] [address]     Phone:  [phone]     furosemide 20 MG tablet         These medications were sent to [facility], [address] [address]     Phone:  [phone]     linezolid 600 MG tablet               Allergies as of 7/4/2017        Reactions    Latex Hives    Asa [Aspirin] Nausea Only    Stomach pain      Immunizations as of 7/4/2017  Reviewed on 6/28/2017    Name Date Dose VIS Date Route    Influenza MDCK, Preservative free 10/7/2013 -- -- --    Influenza Vaccine, unspecified formulation 10/28/2015 -- -- --    Influenza Vaccine, unspecified formulation 11/11/2014 -- -- --    Pneumococcal 13-valent Conjugate (Tlaojzl75) 10/15/2016 0.5 mL 11/5/2015 Intramuscular    Pneumococcal Polysaccharide ([id number]) 2/1/2011 -- -- --    Pneumococcal Vaccine, unspecified formulation 10/28/2015 -- -- --      Last Vitals          Most Recent Value    Temp  98.8 °F (37.1 °C)    Pulse  69 Resp  18    BP  (!)  124/51         After Visit Summary    This summary was created for you. Thank you for entrusting your care to us. The following information includes details about your hospital/visit stay along with steps you should take to help with your recovery once you leave the hospital.  In this packet, you will find information about the topics listed below:    · Instructions about your medications including a list of your home medications  · A summary of your hospital visit  · Follow-up appointments once you have left the hospital  · Your care plan at home      You may receive a survey regarding the care you received during your stay. Your input is valuable to us. We encourage you to complete and return your survey in the envelope provided. We hope you will choose us in the future for your healthcare needs. Patient Information     Patient Name Rosa Yang 1940      Care Provided at:     Name Address Phone       [facility] [address] [phone]            Your Visit    Here you will find information about your visit, including the reason for your visit. Please take this sheet with you when you visit your doctor or other health care provider in the future. It will help determine the best possible medical care for you at that time. If you have any questions once you leave the hospital, please call the department phone number listed below. Why you were here     Your primary diagnosis was:  Cellulitis    Your diagnoses also included:  Essential Hypertension, Hyperlipidemia, Gastroesophageal Reflux Disease Without Esophagitis, Tobacco Abuse, Obesity (Bmi 30.0-34.9), Acute Kidney Injury (Hcc), Anemia, Noncompliance, Ckd (Chronic Kidney Disease) Stage 3, Gfr 30-59 Ml/Min, Iron Deficiency Anemia      Visit Information     Date & Time Provider Department Dept.  Phone [facility]) Continuity of Care Form    Patient Name: Helen Dillard   :  1940  MRN:  [id number]    Admit date:  2017  Discharge date:  17    Code Status Order: Full Code   Advance Directives: No    Admitting Physician:  Ron Marie MD  PCP: Bebeto Rico PA-C    Discharging Nurse: [facility] Unit/Room#:   Discharging Unit Phone Number: [phone]    Emergency Contact:        Past Surgical History:  Past Surgical History:   Procedure Laterality Date    APPENDECTOMY      BREAST LUMPECTOMY Left 2016    with sentinel lymph node dissection    CHOLECYSTECTOMY  2011    HYSTERECTOMY      partial    MASTECTOMY Left 2016    ND SONO GUIDE NEEDLE BIOPSY  2016    STOMACH SURGERY      \"MASS REMOVED ,( BENIGN)\"    TOTAL KNEE ARTHROPLASTY Right     TUBAL LIGATION         Immunization History:   Immunization History   Administered Date(s) Administered    Influenza MDCK, Preservative free 10/07/2013    Influenza Vaccine, unspecified formulation 2014, 10/28/2015    Pneumococcal 13-valent Conjugate ([id number]) 10/15/2016    Pneumococcal Polysaccharide (Kecbtyebf43) 2011    Pneumococcal Vaccine, unspecified formulation 10/28/2015       Active Problems:  Patient Active Problem List   Diagnosis    Chronic pancreatitis ([address]    Essential hypertension    Hyperlipidemia    Psoriasis    Gastroesophageal reflux disease without esophagitis    Bell's palsy    Chronic deep vein thrombosis (DVT) of both lower extremities ([address]    Tobacco abuse    History of alcohol abuse    Malignant neoplasm of upper-outer quadrant of left female breast ([address]    Obesity (BMI 30.0-34. 9)    Anemia associated with acute blood loss    Hematoma (nontraumatic) of breast    Local infection of skin and subcutaneous tissue    Pseudomonas aeruginosa infection    Anorexia    Orthostatic dizziness    Hyperkalemia    Acute kidney injury ([address] in-between meals, and/or before bedtime. These supplements can be purchased at most local grocery stores, pharmacies, and chain super-stores. ? If you have any questions about your diet or nutrition, call the hospital and ask for the dietitian. GENERAL DIET           Activity Instructions     ACTIVITY AS TOLERATED           Important information for a smoker       SMOKING: QUIT SMOKING. THIS IS THE MOST IMPORTANT ACTION YOU CAN TAKE TO IMPROVE YOUR CURRENT AND FUTURE HEALTH. Call the [facility] ([phone])    Smoking harms nonsmokers. When nonsmokers are around people who smoke, they absorb nicotine, carbon monoxide, and other ingredients of tobacco smoke. DO NOT SMOKE AROUND CHILDREN     Children exposed to secondhand smoke are at an increased risk of:  Sudden Infant Death Syndrome (SIDS), acute respiratory infections, inflammation of the middle ear, and severe asthma. Over a longer time, it causes heart disease and lung cancer. There is no safe level of exposure to secondhand smoke. CAPPTURE Signup     Our records indicate that you have an active CAPPTURE account. You can view your After Visit Summary by going to [email or web address] and logging in with your CAPPTURE username and password. If you don't have a CAPPTURE username and password but a parent or guardian has access to your record, the parent or guardian should login with their own CAPPTURE username and password and access your record to view the After Visit Summary. Additional Information  If you have questions, please contact the physician practice where you receive care. Remember, CAPPTURE is NOT to be used for urgent needs. For medical emergencies, dial 911. For questions regarding your CAPPTURE account call [phone]. If you have a clinical question, please call your doctor's office.          View your information online ? Review your current list of  medications, immunization, and allergies. ? Review your future test results online . ? Review your discharge instructions provided by your caregivers at discharge    Certain functionality such as prescription refills, scheduling appointments or sending messages to your provider are not activated if your provider does not use [facility] in his/her office    For questions regarding your MyChart account call [phone]. If you have a clinical question, please call your doctor's office. The information on all pages of the After Visit Summary has been reviewed with me, the patient and/or responsible adult, by my health care provider(s). I had the opportunity to ask questions regarding this information. I understand I should dispose of my armband safely at home to protect my health information. A complete copy of the After Visit Summary has been given to me, the patient and/or responsible adult. Patient Signature/Responsible Adult:____________________    Clinician Signature:_____________________    Date:_____________________    Time:_____________________        More Information           Cellulitis: Care Instructions  Your Care Instructions    Cellulitis is a skin infection. It often occurs after a break in the skin from a scrape, cut, bite, or puncture, or after a rash. The doctor has checked you carefully, but problems can develop later. If you notice any problems or new symptoms, get medical treatment right away. Follow-up care is a key part of your treatment and safety. Be sure to make and go to all appointments, and call your doctor if you are having problems. It's also a good idea to know your test results and keep a list of the medicines you take. How can you care for yourself at home? · Take your antibiotics as directed. Do not stop taking them just because you feel better. You need to take the full course of antibiotics. · Prop up the infected area on pillows to reduce pain and swelling. Try to keep the area above the level of your heart as often as you can. · If your doctor told you how to care for your wound, follow your doctor's instructions. If you did not get instructions, follow this general advice:  ¨ Wash the wound with clean water 2 times a day. Don't use hydrogen peroxide or alcohol, which can slow healing. ¨ You may cover the wound with a thin layer of petroleum jelly, such as Vaseline, and a nonstick bandage. ¨ Apply more petroleum jelly and replace the bandage as needed. · Be safe with medicines. Take pain medicines exactly as directed. ¨ If the doctor gave you a prescription medicine for pain, take it as prescribed. ¨ If you are not taking a prescription pain medicine, ask your doctor if you can take an over-the-counter medicine. To prevent cellulitis in the future  · Try to prevent cuts, scrapes, or other injuries to your skin. Cellulitis most often occurs where there is a break in the skin. · If you get a scrape, cut, mild burn, or bite, wash the wound with clean water as soon as you can to help avoid infection. Don't use hydrogen peroxide or alcohol, which can slow healing. · If you have swelling in your legs (edema), support stockings and good skin care may help prevent leg sores and cellulitis. · Take care of your feet, especially if you have diabetes or other conditions that increase the risk of infection. Wear shoes and socks. Do not go barefoot. If you have athlete's foot or other skin problems on your feet, talk to your doctor about how to treat them. When should you call for help? Call your doctor now or seek immediate medical care if:  · You have signs that your infection is getting worse, such as:  ¨ Increased pain, swelling, warmth, or redness. ¨ Red streaks leading from the area. ¨ Pus draining from the area. ¨ A fever. · You get a rash. Watch closely for changes in your health, and be sure to contact your doctor if:  · You are not getting better after 1 day (24 hours). · You do not get better as expected. Where can you learn more? Go to [email or web address] and sign in to your [employer] account. Enter G026 in the [facility] box to learn more about \"Cellulitis: Care Instructions. \"     If you do not have an account, please click on the \"Sign Up Now\" link. Current as of: October 13, 2016  Content Version: 11.2  © 7877-9504 [employer]. Care instructions adapted under license by [facility] ([facility]). If you have questions about a medical condition or this instruction, always ask your healthcare professional. [address] any warranty or liability for your use of this information. Heart Failure: Care Instructions  Your Care Instructions    Heart failure occurs when your heart does not pump as much blood as the body needs. Failure does not mean that the heart has stopped pumping but rather that it is not pumping as well as it should. Over time, this causes fluid buildup in your lungs and other parts of your body. Fluid buildup can cause shortness of breath, fatigue, swollen ankles, and other problems. By taking medicines regularly, reducing sodium (salt) in your diet, checking your weight every day, and making lifestyle changes, you can feel better and live longer. Follow-up care is a key part of your treatment and safety. Be sure to make and go to all appointments, and call your doctor if you are having problems. It's also a good idea to know your test results and keep a list of the medicines you take. How can you care for yourself at home? Medicines  · Be safe with medicines. Take your medicines exactly as prescribed. Call your doctor if you think you are having a problem with your medicine.   · Do not take any vitamins, over-the-counter medicine, or herbal products get exercise. Start out by walking a little more than you did before. Bit by bit, increase the amount you walk. · When you exercise, watch for signs that your heart is working too hard. You are pushing yourself too hard if you cannot talk while you are exercising. If you become short of breath or dizzy or have chest pain, stop, sit down, and rest.  · If you feel \"wiped out\" the day after you exercise, walk slower or for a shorter distance until you can work up to a better pace. · Get enough rest at night. Sleeping with 1 or 2 pillows under your upper body and head may help you breathe easier. Lifestyle changes  · Do not smoke. Smoking can make a heart condition worse. If you need help quitting, talk to your doctor about stop-smoking programs and medicines. These can increase your chances of quitting for good. Quitting smoking may be the most important step you can take to protect your heart. · Limit alcohol to 2 drinks a day for men and 1 drink a day for women. Too much alcohol can cause health problems. · Avoid getting sick from colds and the flu. Get a pneumococcal vaccine shot. If you have had one before, ask your doctor whether you need another dose. Get a flu shot each year. If you must be around people with colds or the flu, wash your hands often. When should you call for help? Call 911 if you have symptoms of sudden heart failure such as:  · You have severe trouble breathing. · You cough up pink, foamy mucus. · You have a new irregular or rapid heartbeat. Call your doctor now or seek immediate medical care if:  · You have new or increased shortness of breath. · You are dizzy or lightheaded, or you feel like you may faint. · You have sudden weight gain, such as 3 pounds or more in 2 to 3 days. · You have increased swelling in your legs, ankles, or feet. · You are suddenly so tired or weak that you cannot do your usual activities. · itching, loss of appetite, dark urine, earnestine-colored stools, jaundice (yellowing of the skin or eyes);  · severe pain in your upper stomach spreading to your back, nausea and vomiting;  · weight loss, body aches, numbness;  · swelling, rapid weight gain, urinating less than usual or not at all;  · chest pain, new or worsening cough with fever, trouble breathing;  · pale skin, bruising, unusual bleeding, feeling light-headed, rapid heart rate, trouble concentrating;  · low potassium (confusion, uneven heart rate, leg discomfort, muscle weakness or limp feeling);  · low calcium (tingly feeling around your mouth, muscle tightness or contraction, overactive reflexes);  · headache, feeling unsteady, weak or shallow breathing; or  · severe skin reaction -- fever, sore throat, swelling in your face or tongue, burning in your eyes, skin pain, followed by a red or purple skin rash that spreads (especially in the face or upper body) and causes blistering and peeling. Less serious side effects may include:  · diarrhea, constipation, stomach pain;  · dizziness, spinning sensation; or  · mild itching or rash. This is not a complete list of side effects and others may occur. Call your doctor for medical advice about side effects. You may report side effects to FDA at 0-817-FDA-4762. What other drugs will affect furosemide? If you take sucralfate (Carafate), take it at least 2 hours before or after you take furosemide.   Tell your doctor about all other medicines you use, especially:  · cisplatin (Platinol);  · cyclosporine (Neoral, Gengraf, Sandimmune);  · ethacrynic acid (Edecrin);  · lithium (Eskalith, Lithobid);  · methotrexate (Rheumatrex, Trexall);  · phenytoin (Dilantin);  · an antibiotic such as amikacin (Amikin), cefdinir (Omnicef), cefprozil (Cefzil), cefuroxime (Ceftin), cephalexin (Keflex), gentamicin (Garamycin), kanamycin (Kantrex), neomycin (Mycifradin, Akil Fradin, Akil Tab), streptomycin, tobramycin (Nebcin, Yakov);  · heart or blood pressure medication such as amiodarone (Cordarone, Pacerone), benazepril (Lotensin), candesartan (Atacand), eprosartan (Teveten), enalapril (Vasotec), irbesartan (Avapro, Avalide), lisinopril (Prinivil, Zestril), losartan (Cozaar, Hyzaar), olmesartan (Benicar), quinapril (Accupril), ramipril (Altace), telmisartan (Micardis), valsartan (Diovan), and others;  · a laxative (Metamucil, Milk of Magnesia, Colace, Dulcolax, Epsom salts, senna, and others)  · salicylates such as aspirin, Disalcid, Atilio's Pills, Dolobid, Salflex, Tricosal, and others; or  · steroids (prednisone and others). This list is not complete and other drugs may interact with furosemide. Tell your doctor about all medications you use. This includes prescription, over-the-counter, vitamin, and herbal products. Do not start a new medication without telling your doctor. Where can I get more information? Your pharmacist can provide more information about furosemide. Remember, keep this and all other medicines out of the reach of children, never share your medicines with others, and use this medication only for the indication prescribed. Every effort has been made to ensure that the information provided by [address] is accurate, up-to-date, and complete, but no guarantee is made to that effect. Drug information contained herein may be time sensitive. [facility] information has been compiled for use by healthcare practitioners and consumers in the [place] and therefore [facility] does not warrant that uses outside of the [place] are appropriate, unless specifically indicated otherwise. [facility]'s drug information does not endorse drugs, diagnose patients or recommend therapy.  [facility]'s drug information is an informational resource designed to assist licensed healthcare practitioners in caring for their patients and/or to serve consumers viewing this service as a supplement to, and not a substitute for, the expertise, skill, knowledge and judgment of healthcare practitioners. The absence of a warning for a given drug or drug combination in no way should be construed to indicate that the drug or drug combination is safe, effective or appropriate for any given patient. [facility] does not assume any responsibility for any aspect of healthcare administered with the aid of information [facility] provides. The information contained herein is not intended to cover all possible uses, directions, precautions, warnings, drug interactions, allergic reactions, or adverse effects. If you have questions about the drugs you are taking, check with your doctor, nurse or pharmacist.  Copyright 9301-2263 [address]: 13.01. Revision date: 8/10/2012. Care instructions adapted under license by your healthcare professional. If you have questions about a medical condition or this instruction, always ask your healthcare professional. [place] [address] any warranty or liability for your use of this information. linezolid (oral/injection)  Pronunciation:  rosa CHURCHILL oh lid  Brand:  Zyvox  What is the most important information I should know about linezolid? Some medicines can cause unwanted or dangerous effects when used with linezolid. Tell your doctor about all other medicines you use. Do not use linezolid if you have taken an MAO inhibitor in the past 14 days. A dangerous drug interaction could occur. MAO inhibitors include isocarboxazid, phenelzine, rasagiline, selegiline, and tranylcypromine. What is linezolid? Linezolid is an antibiotic that fights bacteria in the body. Linezolid is also an MAO (monoamine oxidase) inhibitor. Linezolid is used to treat different types of bacterial infections, such as pneumonia, skin infections, and infections that are resistant to other antibiotics. Linezolid may also be used for purposes not listed in this medication guide. What should I discuss with my healthcare provider before using linezolid? You should not use linezolid if you are allergic to it. Do not use linezolid if you have taken an MAO inhibitor in the past 14 days. A dangerous drug interaction could occur. MAO inhibitors include isocarboxazid, methylene blue injection, phenelzine, rasagiline, selegiline, and tranylcypromine. Some medicines can interact with linezolid and should not be used at the same time. Your doctor may need to change your treatment plan if you use any of the following drugs:  · buspirone, clozapine, cyclobenzaprine, epinephrine (Epi-Pen), meperidine, procarbazine, [place]'s wort, tramadol;  · ADHD medication --Adderall, Ritalin, and others; a diet pill or other stimulant; cold medicine that contains a decongestant; medicine that can raise blood pressure such as dobutamine, dopamine, norephinephrine;  · an antidepressant --bupropion, citalopram, fluoxetine, imipramine, milnacipran, nefazodone, paroxetine, sertraline, trazodone, venlafaxine, and others; migraine or cluster headache medications -- (\"triptans\") such as Amerge, Imitrex or Maxalt, Zomig and others;  · medication to treat Parkinson's disease or restless leg syndrome; glaucoma medicine used in the eyes --apraclonidine, brimonidine. To make sure linezolid is safe for you, tell your doctor if you have:  · a history of high blood pressure;  · a thyroid disorder;  · a carcinoid tumor;  · bone marrow suppression or a weak immune system;  · kidney or liver disease;  · pheochromocytoma (adrenal gland tumor);  · diabetes;  · epilepsy or a history of seizures; or  · if you use a catheter. It is not known whether linezolid will harm an unborn baby. Tell your doctor if you are pregnant.   It is not known whether linezolid passes into breast milk or if it could harm a nursing baby. Tell your doctor if you are breast-feeding a baby. The liquid form of linezolid may contain phenylalanine. Talk to your doctor before using this form of linezolid if you have phenylketonuria (PKU). How is linezolid used? Follow all directions on your prescription label. Do not use this medicine in larger or smaller amounts or for longer than recommended. Injectable linezolid is injected into a vein through an IV. You may be shown how to use an IV at home. Do not self-inject this medicine if you do not understand how to give the injection and properly dispose of used needles, IV tubing, and other items used to inject the medicine. Before taking the oral suspension (liquid), gently mix it by turning the bottle upside down 3 to 5 times. Do not shake. Measure the liquid with a special dose-measuring spoon or medicine cup. If you do not have a dose-measuring device, ask your pharmacist for one. You may take linezolid with or without food. While using linezolid, you may need frequent blood tests. Your vision and blood pressure may also need to be checked often. Use this medication for the full prescribed length of time. Your symptoms may improve before the infection is completely cleared. Skipping doses may also increase your risk of further infection that is resistant to antibiotics. Linezolid will not treat a viral infection such as the common cold or flu. Store all forms of linezolid at room temperature away from moisture, heat, and light. Do not freeze. Throw away any unused oral liquid that is more than 24days old. What happens if I miss a dose? Use the missed dose as soon as you remember. Skip the missed dose if it is almost time for your next scheduled dose. Do not use extra medicine to make up the missed dose. What happens if I overdose? Seek emergency medical attention or call the Poison Help line at 1-625.112.7010. What should I avoid while using linezolid? Antibiotic medicines can cause diarrhea, which may be a sign of a new infection. If you have diarrhea that is watery or bloody, stop taking linezolid and call your doctor. Do not use anti-diarrhea medicine unless your doctor tells you to. Eating tyramine while you are using linezolid can raise your blood pressure to dangerous levels. Avoid foods that have a high level of tyramine, such as:  · aged cheeses or meats;  · pickled or fermented meats, smoked or air-dried meats;  · sauerkraut;  · soy sauce;  · tap beer (alcoholic and nonalcoholic);  · red wine; or  · any meat, cheese, or other protein-based food that has been improperly stored. You should become very familiar with the list of foods you must avoid while you are using linezolid. What are the possible side effects of linezolid? Get emergency medical help if you have signs of an allergic reaction: hives; difficult breathing; swelling of your face, lips, tongue, or throat. Call your doctor at once if you have:  · pale skin, feeling light-headed or short of breath, rapid heart rate, trouble concentrating;  · diarrhea that is watery or bloody;  · vision problems, changes in color vision;  · agitation, hallucinations, fever, fast heart rate, overactive reflexes, diarrhea, loss of coordination, fainting;  · easy bruising, unusual bleeding (nose, mouth, vagina, or rectum), purple or red pinpoint spots under your skin; or  · lactic acidosis--muscle pain or weakness, numbness or cold feeling in your arms and legs, nausea with vomiting, feeling very weak or tired. Common side effects may include:  · vaginal itching or discharge;  · constipation, mild diarrhea, mild nausea;  · mild skin rash;  · sleep problems (insomnia); or  · headache, dizziness. This is not a complete list of side effects and others may occur. Call your doctor for medical advice about side effects. You may report side effects to FDA at 5-014-FDA-2731. What other drugs will affect linezolid?

## 2017-06-28 NOTE — PROGRESS NOTES
Paged Dr. Julianne Silva at 1606pm on 6/28/17. Cristhian Conde  2nd page at 17:15 pm, 3rd page at 5:55pm.Araceli Montilla  I spoke with Dr. Julianne Silva at 6:03 pm.Araceli Vergara

## 2017-06-28 NOTE — PROGRESS NOTES
Pharmacy Note  Vancomycin Consult    Nenana Rajan is a 67 y.o. female started on Vancomycin for cellulitis; consult received from Dr. Marissa Barahona to manage therapy. Patient Active Problem List   Diagnosis    [facility] [address] [address]    Essential hypertension    Hyperlipidemia    Psoriasis    Gastroesophageal reflux disease without esophagitis    Bell's palsy    Chronic deep vein thrombosis (DVT) of both lower extremities (HCC)    Tobacco abuse    History of alcohol abuse    Malignant neoplasm of upper-outer quadrant of left female breast ([address]    Obesity (BMI 30.0-34. 9)    Anemia associated with acute blood loss    Hematoma (nontraumatic) of breast    Local infection of skin and subcutaneous tissue    Pseudomonas aeruginosa infection    Anorexia    Orthostatic dizziness    Hyperkalemia    Acute kidney injury (HCC)    Alcohol-induced chronic pancreatitis (HCC)    Delayed surgical wound healing       Allergies:  Latex and Asa [aspirin]     Temp max: 99    Recent Labs      06/28/17   1240   BUN  16       Recent Labs      06/28/17   1240   CREATININE  1.10*       Recent Labs      06/28/17   1240   WBC  7.4       No intake or output data in the 24 hours ending 06/28/17 1330    Culture Date      Source                       Results  See micro    Ht Readings from Last 1 Encounters:   06/28/17 4' 11.84\" (1.52 m)        Wt Readings from Last 1 Encounters:   06/28/17 159 lb (72.1 kg)         Body mass index is 31.22 kg/(m^2). CrCl cannot be calculated (Unknown ideal weight. ). Assessment/Plan:  Will initiate vancomycin 1000 mg IV x 1 dose in ER. Thank you for the consult. Will continue to follow.     Katelynn Aly PharmD, [facility]  6/28/2017   1:30 PM

## 2017-06-28 NOTE — ED PROVIDER NOTES
[address] ED [address] [facility]  eMERGENCY dEPARTMENT eNCOUnter      Pt Name: Amina Dillard  MRN: [id number]  [place] 1940  Date of evaluation: 6/28/17    CHIEF COMPLAINT       Chief Complaint   Patient presents with    Leg Swelling         HISTORY OF PRESENT ILLNESS    Julian Taveras is a 67 y.o. female who presents With right lower cavity swelling with warmth and pain. Patient says the pain is 10 out of 10 and it is throbbing and is worse with palpation. Patient denies any fever, chills, nausea, vomiting, diarrhea. Patient says the pain has been going on since the leg swelled over 1 week ago. Patient was admitted to the hospital a week ago and discharged but the pain and symptoms have not improved. Patient says the swelling has become worse. Symptoms are constant. Pt is on doxycycline and is not any better. REVIEW OF SYSTEMS       Review of Systems   Constitutional: Negative for chills and fever. HENT: Negative for hearing loss, rhinorrhea, sore throat, tinnitus and trouble swallowing. Eyes: Negative for photophobia, pain and discharge. Respiratory: Negative for cough and shortness of breath. Cardiovascular: Negative for chest pain and palpitations. Gastrointestinal: Negative for abdominal pain, diarrhea, nausea and vomiting. Genitourinary: Negative for dysuria, frequency and hematuria. Musculoskeletal: Negative for arthralgias and myalgias. Leg swelling, right. Skin: Negative for rash and wound. Neurological: Negative for dizziness, syncope and headaches. Psychiatric/Behavioral: Negative for self-injury and suicidal ideas. PAST MEDICAL HISTORY    has a past medical history of Abnormal kidney function; Acid reflux disease; Anesthesia; Arthritis; Bell's palsy; Cancer ([address] Cerebrovascular disease; COPD (chronic obstructive pulmonary disease) ([address] Deep vein thrombosis (DVT) ([facility] [address] Edema;  History of cancer of left breast; Hx of blood clots; Hyperlipidemia; Hypertension; Pancreatitis; Wears dentures; and Wears glasses. SURGICAL HISTORY      has a past surgical history that includes Cholecystectomy (); Hysterectomy; Stomach surgery; Total knee arthroplasty (Right); Tubal ligation; Appendectomy; Breast lumpectomy (Left, 2016); sono guide needle biopsy (2016); and Mastectomy (Left, 2016). CURRENT MEDICATIONS       Previous Medications    ACETAMINOPHEN-CODEINE (CAPITAL/CODEINE) 120-12 MG/5ML SUSPENSION    Take 10 mLs by mouth every 8 hours as needed for Pain    CARVEDILOL (COREG) 25 MG TABLET    Take 25 mg by mouth 2 times daily (with meals)    CLOPIDOGREL (PLAVIX) 75 MG TABLET    1 tab once daily    DICLOFENAC SODIUM (VOLTAREN) 1 % GEL    Apply 2 g topically 2 times daily    DOCUSATE SODIUM (COLACE) 100 MG CAPSULE    Take 100 mg by mouth 2 times daily    DOXYCYCLINE HYCLATE (VIBRA-TABS) 100 MG TABLET    Take 1 tablet by mouth 2 times daily for 10 days    HYDRALAZINE (APRESOLINE) 100 MG TABLET    Take 100 mg by mouth 3 times daily    PANTOPRAZOLE (PROTONIX) 40 MG TABLET    Take 1 tablet by mouth daily    SIMVASTATIN (ZOCOR) 40 MG TABLET    Take 40 mg by mouth nightly       ALLERGIES     is allergic to latex and asa [aspirin]. FAMILY HISTORY     indicated that her mother is . She indicated that her father is . She indicated that her maternal grandmother is . She indicated that her maternal grandfather is . She indicated that her paternal grandmother is . She indicated that her paternal grandfather is . family history includes Heart Disease in her mother; High Blood Pressure in her mother; Other in her father. SOCIAL HISTORY      reports that she has been smoking. She has been smoking about 1.00 pack per day. She has never used smokeless tobacco. She reports that she drinks about 0.6 oz of alcohol per week  She reports that she does not use illicit drugs.     PHYSICAL EXAM INITIAL VITALS:  weight is 159 lb (72.1 kg). Her oral temperature is 98.4 °F (36.9 °C). Her blood pressure is 157/68 (abnormal) and her pulse is 69. Her respiration is 14 and oxygen saturation is 96%. Physical Exam   Constitutional: She is oriented to person, place, and time and well-developed, well-nourished, and in no distress. HENT:   Head: Normocephalic and atraumatic. Eyes: EOM are normal. Pupils are equal, round, and reactive to light. Right eye exhibits no discharge. Left eye exhibits no discharge. Neck: Normal range of motion. No JVD present. No tracheal deviation present. Cardiovascular: Normal rate, regular rhythm, normal heart sounds and intact distal pulses. Exam reveals no gallop and no friction rub. No murmur heard. Pulmonary/Chest: Effort normal and breath sounds normal. No respiratory distress. She has no wheezes. She has no rales. Abdominal: Soft. Bowel sounds are normal. She exhibits no distension and no mass. There is no tenderness. There is no rebound and no guarding. Musculoskeletal: Normal range of motion. She exhibits edema and tenderness. Significant swelling around the entire RLE. Pts leg is warm and dark in color. Neurological: She is alert and oriented to person, place, and time. No cranial nerve deficit. Coordination normal. GCS score is 15. Skin: Skin is warm and dry. No rash noted. She is not diaphoretic. No erythema. Psychiatric: Mood and affect normal.         DIFFERENTIAL DIAGNOSIS/MDM:   Pt with negative recent doppler and leg swelling with warmth. Concern for persistent cellulitis. Sent here by PA for admission. Last admission was to the wrong group. WIll admit to her PCP for further care and placement. bloodwork and antibiotics will be anticipated.      DIAGNOSTIC RESULTS     EKG: All EKG's are interpreted by the Emergency Department Physician who either signs or Co-signs this chart in the absence of a cardiologist.    none    RADIOLOGY:   I directly visualized the following  images and reviewed the radiologist interpretations:  Xr Chest Standard Two Vw    Result Date: 6/17/2017  EXAMINATION: TWO VIEWS OF THE CHEST 6/17/2017 11:20 pm COMPARISON: Arabella 10, 2016, acute abdominal series 10/12/2016 HISTORY: ORDERING SYSTEM PROVIDED HISTORY: weakness TECHNOLOGIST PROVIDED HISTORY: Reason for exam:->weakness Ordering Physician Provided Reason for Exam: weakness Acuity: Acute Type of Exam: Initial FINDINGS: Aortic calcifications present. The mediastinal and cardiac contours are stable. Cardiac silhouette is mildly enlarged but stable. No lobar lung consolidation, pleural effusion or pneumothorax. The bones are stable with thoracic kyphosis and degenerative changes of the spine and shoulders. Left axillary surgical clips are present. Left mastectomy changes noted. Senescent lung changes are present. No radiographic evidence of acute cardiopulmonary disease. IMPRESSION: No acute process. Vl Lower Extremity Venous Right    Result Date: 6/18/2017    [facility]  Vascular Lower Extremities DVT Study Procedure   Patient Name   FATIMAH  Date of Study           06/17/2017                 Janit Renee   Date of Birth  1940   Gender                  Female   Age            67 year(s)   Race                    Black   Room Number    2052   Corporate ID # [id number]   Patient Acct # [de-identified]   MR #           [id number]       Philipp Purcell   Accession #    [id number]    Interpreting Physician  Elvira Hernandez   Referring                   Referring Physician     ER MD *  Nurse  Practitioner  Procedure Type of Study:   Veins: Lower Extremities DVT Study, Venous Scan Lower Right. Indications for Study:Pain and swelling. Findings:   Right  The common femoral, femoral, popliteal, tibials, and saphenous veins are  compressible with normal doppler responses. The posterior tibial and peroneal veins were not visualized.   Enlarged As above, reviewed      EMERGENCY DEPARTMENT COURSE:   Vitals:    Vitals:    06/28/17 1148 06/28/17 1251 06/28/17 1404   BP: (!) 177/134 (!) 158/60 (!) 157/68   Pulse: 74 75 69   Resp: 16  14   Temp: 99 °F (37.2 °C)  98.4 °F (36.9 °C)   TempSrc:   Oral   SpO2: 98% 97% 96%   Weight: 159 lb (72.1 kg)       As above, reviewed. CRITICAL CARE:  none    CONSULTS:  Dr. Lucas Reynoso. For Dr. Chandrika Everett - recommends Infectious disease consult. Accepts admission. PROCEDURES:  none    FINAL IMPRESSION      1.  Cellulitis, unspecified cellulitis site          DISPOSITION/PLAN   Admitted        PATIENT REFERRED TO:  Tanesha Reis PA-C  [address]  [phone]            DISCHARGE MEDICATIONS:  New Prescriptions    FUROSEMIDE (LASIX) 20 MG TABLET    Take 1 tablet by mouth 2 times daily       (Please note that portions of this note were completed with a voice recognition program.  Efforts were made to edit the dictations but occasionally words are mis-transcribed.)    Sindy Torres MD  Emergency Medicine              Sindy Torres MD  06/28/17 6495

## 2017-06-29 PROBLEM — N18.30 CKD (CHRONIC KIDNEY DISEASE) STAGE 3, GFR 30-59 ML/MIN (HCC): Status: ACTIVE | Noted: 2017-06-29

## 2017-06-29 PROBLEM — Z91.199 NONCOMPLIANCE: Status: ACTIVE | Noted: 2017-06-29

## 2017-06-29 LAB
ANION GAP SERPL CALCULATED.3IONS-SCNC: 11 MMOL/L (ref 9–17)
BUN BLDV-MCNC: 16 MG/DL (ref 8–23)
BUN/CREAT BLD: ABNORMAL (ref 9–20)
CALCIUM SERPL-MCNC: 8.4 MG/DL (ref 8.6–10.4)
CHLORIDE BLD-SCNC: 106 MMOL/L (ref 98–107)
CO2: 23 MMOL/L (ref 20–31)
CREAT SERPL-MCNC: 1.07 MG/DL (ref 0.5–0.9)
GFR AFRICAN AMERICAN: >60 ML/MIN
GFR NON-AFRICAN AMERICAN: 50 ML/MIN
GFR SERPL CREATININE-BSD FRML MDRD: ABNORMAL ML/MIN/{1.73_M2}
GFR SERPL CREATININE-BSD FRML MDRD: ABNORMAL ML/MIN/{1.73_M2}
GLUCOSE BLD-MCNC: 93 MG/DL (ref 70–99)
HCT VFR BLD CALC: 29.1 % (ref 36–46)
HEMOGLOBIN: 9.1 G/DL (ref 12–16)
MCH RBC QN AUTO: 21.3 PG (ref 26–34)
MCHC RBC AUTO-ENTMCNC: 31.2 G/DL (ref 31–37)
MCV RBC AUTO: 68.4 FL (ref 80–100)
PDW BLD-RTO: 18 % (ref 11.5–14.9)
PLATELET # BLD: 400 K/UL (ref 150–450)
PMV BLD AUTO: 8 FL (ref 6–12)
POTASSIUM SERPL-SCNC: 4.4 MMOL/L (ref 3.7–5.3)
RBC # BLD: 4.25 M/UL (ref 4–5.2)
SODIUM BLD-SCNC: 140 MMOL/L (ref 135–144)
WBC # BLD: 6.3 K/UL (ref 3.5–11)

## 2017-06-29 PROCEDURE — 93970 EXTREMITY STUDY: CPT

## 2017-06-29 PROCEDURE — 97165 OT EVAL LOW COMPLEX 30 MIN: CPT

## 2017-06-29 PROCEDURE — 2580000003 HC RX 258: Performed by: FAMILY MEDICINE

## 2017-06-29 PROCEDURE — 6370000000 HC RX 637 (ALT 250 FOR IP): Performed by: FAMILY MEDICINE

## 2017-06-29 PROCEDURE — 99223 1ST HOSP IP/OBS HIGH 75: CPT | Performed by: FAMILY MEDICINE

## 2017-06-29 PROCEDURE — G8979 MOBILITY GOAL STATUS: HCPCS

## 2017-06-29 PROCEDURE — 80048 BASIC METABOLIC PNL TOTAL CA: CPT

## 2017-06-29 PROCEDURE — 87040 BLOOD CULTURE FOR BACTERIA: CPT

## 2017-06-29 PROCEDURE — G8987 SELF CARE CURRENT STATUS: HCPCS

## 2017-06-29 PROCEDURE — 2580000003 HC RX 258: Performed by: EMERGENCY MEDICINE

## 2017-06-29 PROCEDURE — G8988 SELF CARE GOAL STATUS: HCPCS

## 2017-06-29 PROCEDURE — 36415 COLL VENOUS BLD VENIPUNCTURE: CPT

## 2017-06-29 PROCEDURE — G0378 HOSPITAL OBSERVATION PER HR: HCPCS

## 2017-06-29 PROCEDURE — 97161 PT EVAL LOW COMPLEX 20 MIN: CPT

## 2017-06-29 PROCEDURE — G8980 MOBILITY D/C STATUS: HCPCS

## 2017-06-29 PROCEDURE — 97535 SELF CARE MNGMENT TRAINING: CPT

## 2017-06-29 PROCEDURE — 6360000002 HC RX W HCPCS: Performed by: FAMILY MEDICINE

## 2017-06-29 PROCEDURE — 6360000002 HC RX W HCPCS: Performed by: EMERGENCY MEDICINE

## 2017-06-29 PROCEDURE — 85027 COMPLETE CBC AUTOMATED: CPT

## 2017-06-29 PROCEDURE — 99222 1ST HOSP IP/OBS MODERATE 55: CPT | Performed by: INTERNAL MEDICINE

## 2017-06-29 PROCEDURE — G8978 MOBILITY CURRENT STATUS: HCPCS

## 2017-06-29 PROCEDURE — 1200000000 HC SEMI PRIVATE

## 2017-06-29 RX ADMIN — CARVEDILOL 25 MG: 25 TABLET, FILM COATED ORAL at 10:27

## 2017-06-29 RX ADMIN — FUROSEMIDE 20 MG: 20 TABLET ORAL at 10:27

## 2017-06-29 RX ADMIN — PANTOPRAZOLE SODIUM 40 MG: 40 TABLET, DELAYED RELEASE ORAL at 10:26

## 2017-06-29 RX ADMIN — OXYCODONE HYDROCHLORIDE AND ACETAMINOPHEN 1 TABLET: 5; 325 TABLET ORAL at 21:36

## 2017-06-29 RX ADMIN — CLOPIDOGREL BISULFATE 75 MG: 75 TABLET ORAL at 10:27

## 2017-06-29 RX ADMIN — HYDRALAZINE HYDROCHLORIDE 100 MG: 50 TABLET, FILM COATED ORAL at 15:56

## 2017-06-29 RX ADMIN — DOCUSATE SODIUM 100 MG: 100 CAPSULE, LIQUID FILLED ORAL at 10:26

## 2017-06-29 RX ADMIN — HYDRALAZINE HYDROCHLORIDE 100 MG: 50 TABLET, FILM COATED ORAL at 21:36

## 2017-06-29 RX ADMIN — OXYCODONE HYDROCHLORIDE AND ACETAMINOPHEN 2 TABLET: 5; 325 TABLET ORAL at 15:56

## 2017-06-29 RX ADMIN — CARVEDILOL 25 MG: 25 TABLET, FILM COATED ORAL at 18:11

## 2017-06-29 RX ADMIN — ENOXAPARIN SODIUM 40 MG: 40 INJECTION SUBCUTANEOUS at 10:25

## 2017-06-29 RX ADMIN — HYDRALAZINE HYDROCHLORIDE 100 MG: 50 TABLET, FILM COATED ORAL at 10:26

## 2017-06-29 RX ADMIN — SIMVASTATIN 40 MG: 40 TABLET, FILM COATED ORAL at 21:36

## 2017-06-29 RX ADMIN — DOCUSATE SODIUM 100 MG: 100 CAPSULE, LIQUID FILLED ORAL at 21:36

## 2017-06-29 RX ADMIN — Medication 10 ML: at 10:25

## 2017-06-29 RX ADMIN — OXYCODONE HYDROCHLORIDE AND ACETAMINOPHEN 2 TABLET: 5; 325 TABLET ORAL at 10:26

## 2017-06-29 RX ADMIN — OXYCODONE HYDROCHLORIDE AND ACETAMINOPHEN 2 TABLET: 5; 325 TABLET ORAL at 06:16

## 2017-06-29 RX ADMIN — FUROSEMIDE 20 MG: 20 TABLET ORAL at 21:36

## 2017-06-29 RX ADMIN — VANCOMYCIN HYDROCHLORIDE 1000 MG: 1 INJECTION, POWDER, LYOPHILIZED, FOR SOLUTION INTRAVENOUS at 15:56

## 2017-06-29 ASSESSMENT — PAIN DESCRIPTION - PAIN TYPE
TYPE: ACUTE PAIN

## 2017-06-29 ASSESSMENT — PAIN DESCRIPTION - PROGRESSION
CLINICAL_PROGRESSION: NOT CHANGED
CLINICAL_PROGRESSION: NOT CHANGED

## 2017-06-29 ASSESSMENT — PAIN DESCRIPTION - FREQUENCY
FREQUENCY: CONTINUOUS
FREQUENCY: CONTINUOUS

## 2017-06-29 ASSESSMENT — PAIN SCALES - GENERAL
PAINLEVEL_OUTOF10: 3
PAINLEVEL_OUTOF10: 10
PAINLEVEL_OUTOF10: 10
PAINLEVEL_OUTOF10: 3
PAINLEVEL_OUTOF10: 10
PAINLEVEL_OUTOF10: 7
PAINLEVEL_OUTOF10: 0
PAINLEVEL_OUTOF10: 10
PAINLEVEL_OUTOF10: 10

## 2017-06-29 ASSESSMENT — PAIN DESCRIPTION - LOCATION
LOCATION: LEG

## 2017-06-29 ASSESSMENT — PAIN DESCRIPTION - ORIENTATION
ORIENTATION: RIGHT;LEFT
ORIENTATION: RIGHT
ORIENTATION: RIGHT;LOWER
ORIENTATION: RIGHT;LOWER

## 2017-06-29 ASSESSMENT — PAIN DESCRIPTION - DESCRIPTORS
DESCRIPTORS: BURNING;DISCOMFORT
DESCRIPTORS: SORE;THROBBING
DESCRIPTORS: SORE;THROBBING

## 2017-06-29 ASSESSMENT — PAIN DESCRIPTION - ONSET
ONSET: ON-GOING
ONSET: ON-GOING

## 2017-06-29 NOTE — CONSULTS
Pulse 61  Temp 98.2 °F (36.8 °C) (Oral)   Resp 16  Ht 5' (1.524 m)  Wt 155 lb 3.3 oz (70.4 kg)  SpO2 99%  BMI 30.31 kg/m2          General Appearance: alert and oriented to person, place and time, well-developed and well-nourished, in no acute distress  Skin: warm and dry, no rash or erythema  Head: normocephalic and atraumatic  Eyes: pupils equal, round,  conjunctivae normal  ENT: hearing grossly normal bilaterally  Neck: neck supple and non tender   Pulmonary/Chest: clear to auscultation bilaterally- no wheezes, rales or rhonchi, normal air movement, no respiratory distress  Cardiovascular: normal rate, regular rhythm, normal S1 and S2, no murmurs  Abdomen: soft, non-tender, non-distended, normal bowel sounds. Extremities: no cyanosis, clubbing   Right leg erythema ,edema and warmth extend from above ankle to the upper thigh. Musculoskeletal: normal range of motion, no joint swelling, deformity or tenderness  Neurologic:  muscle strength normal    Data Review:    Recent Labs      06/28/17   1240  06/29/17   0713   WBC  7.4  6.3   HGB  10.0*  9.1*   HCT  31.3*  29.1*   MCV  67.5*  68.4*   PLT  460*  400     Recent Labs      06/28/17   1240  06/29/17   0713   NA  141  140   K  4.8  4.4   CL  105  106   CO2  24  23   BUN  16  16   CREATININE  1.10*  1.07*         Imaging Studies:                           All appropriate imaging studies and reports reviewed: Yes                 Assessment:       Cellulitis of right lower extremity with lymphedema ,r/o DVT     Essential hypertension    Hyperlipidemia    Gastroesophageal reflux disease without esophagitis    Tobacco abuse    Obesity (BMI 30.0-34. 9)    Acute kidney injury ([address]    Anemia    Noncompliance    CKD (chronic kidney disease) stage 3, GFR 30-59 ml/min        Recommendations:   Agree with IV Vancomycin ,monitor level and renal function closely . Repeat venous doppler . Leg elevation and compression .     Thank you for allowing me to participate in the

## 2017-06-29 NOTE — FLOWSHEET NOTE
06/29/17 1356   Provider Notification   Reason for Communication Evaluate;New orders   Provider Name Dr. Anderson Swenson   Provider Notification Physician   Method of Communication Face to face   Response See orders   Notification Time [phone]     Physician at bedside to evaluate Pt. Notified of platelet level [phone]. New order received for bilateral venous duplex scan and blood cultures x2.  Electronically signed by Lucho Miguel RN on 6/29/2017 at 1:58 PM

## 2017-06-29 NOTE — PROGRESS NOTES
Physical Therapy    Facility/Department: [facility] MED SURG  Initial Assessment    NAME: Tyler Dillard  : 1940  MRN: [id number]    Date of Service: 2017    Patient Diagnosis(es):   Patient Active Problem List    Diagnosis Date Noted    Noncompliance 2017    CKD (chronic kidney disease) stage 3, GFR 30-59 ml/min 2017    Cellulitis of right lower extremity 2017    Anemia 2017    Delayed surgical wound healing 10/21/2016    Hyperkalemia 10/14/2016    Acute kidney injury ([address] 10/14/2016    Alcohol-induced chronic pancreatitis ([address]     Anorexia     Orthostatic dizziness     Pseudomonas aeruginosa infection 2016    Hematoma (nontraumatic) of breast 2016    Local infection of skin and subcutaneous tissue 2016    Anemia associated with acute blood loss 2016    Obesity (BMI 30.0-34.9) 2016    Bell's palsy 06/10/2016    Chronic deep vein thrombosis (DVT) of both lower extremities ([address] 06/10/2016    Tobacco abuse 06/10/2016    History of alcohol abuse 06/10/2016    Malignant neoplasm of upper-outer quadrant of left female breast ([address] 06/10/2016    Chronic pancreatitis ([address] 2016    Essential hypertension 2016    Hyperlipidemia 2016    Psoriasis 2016    Gastroesophageal reflux disease without esophagitis 2016       Past Medical History:   Diagnosis Date    Abnormal kidney function     PT. RELATES UNAWARE OF.  Acid reflux disease     Anesthesia     'needs to be asleep when ET tube removed due  to severe gastric reflux will make me have hard time breathing. \"     Arthritis     Bell's palsy     Cancer ([address]     Breast, lt.  Cerebrovascular disease     pt denies stroke she says she had bells palsy    COPD (chronic obstructive pulmonary disease) ([address]     patient is unaware    Deep vein thrombosis (DVT) ([address]     multiple    Edema     RT. LEG.     History of cancer of left breast     Hx of blood impaired, limited or restricted                     Therapy Time   Individual Concurrent Group Co-treatment   Time In [address]         Time Out 1305         Minutes [facility]

## 2017-06-29 NOTE — H&P
tablet by mouth 2 times daily 6/28/17   Veronica Aviles PA-C   acetaminophen-codeine (CAPITAL/CODEINE) 120-12 MG/5ML suspension Take 10 mLs by mouth every 8 hours as needed for Pain 6/26/17 7/6/17  Lulu Ovalle NP   doxycycline hyclate (VIBRA-TABS) 100 MG tablet Take 1 tablet by mouth 2 times daily for 10 days 6/21/17 7/1/17  Nica Barreto MD   pantoprazole (PROTONIX) 40 MG tablet Take 1 tablet by mouth daily 6/21/17   Nica Barreto MD   carvedilol (COREG) 25 MG tablet Take 25 mg by mouth 2 times daily (with meals)    Historical Provider, MD   hydrALAZINE (APRESOLINE) 100 MG tablet Take 100 mg by mouth 3 times daily    Historical Provider, MD   docusate sodium (COLACE) 100 MG capsule Take 100 mg by mouth 2 times daily    Historical Provider, MD   simvastatin (ZOCOR) 40 MG tablet Take 40 mg by mouth nightly    Historical Provider, MD   clopidogrel (PLAVIX) 75 MG tablet 1 tab once daily 4/25/17   Veronica Aviles PA-C   diclofenac sodium (VOLTAREN) 1 % GEL Apply 2 g topically 2 times daily 4/13/17   Veronica Aviles PA-C       Allergies:  Latex and Asa [aspirin]    Social History:   TOBACCO:   reports that she has been smoking. She has been smoking about 1.00 pack per day. She has never used smokeless tobacco.  ETOH:   reports that she drinks about 0.6 oz of alcohol per week         Family History:       Problem Relation Age of Onset    High Blood Pressure Mother     Heart Disease Mother     Other Father      TB       REVIEW OF SYSTEMS:  Negative except for RLE blistering and swelling. Physical Exam:    Vitals: BP (!) 152/67  Pulse 64  Temp 98.4 °F (36.9 °C) (Oral)   Resp 16  Ht 5' (1.524 m)  Wt 155 lb 3.3 oz (70.4 kg)  SpO2 91%  BMI 30.31 kg/m2  General appearance: alert, appears stated age and cooperative  Skin: Skin color, texture, turgor normal. No rashes or lesions  HEENT: Head: Normal, normocephalic, atraumatic.   Neck: no adenopathy, supple, symmetrical, trachea midline and thyroid not enlarged, symmetric, no tenderness/mass/nodules  Lungs: clear to auscultation bilaterally  Heart: regular rate and rhythm, S1, S2 normal, no murmur, click, rub or gallop  Abdomen: soft, non-tender; bowel sounds normal; no masses,  no organomegaly  Extremities: edema 2+ RLE with blistering and Homans sign is negative, no sign of DVT  Neurologic: Mental status: Alert, oriented, thought content appropriate    CBC:   Recent Labs      06/28/17   1240  06/29/17   0713   WBC  7.4  6.3   HGB  10.0*  9.1*   PLT  460*  400     BMP:    Recent Labs      06/28/17   1240  06/29/17   0713   NA  141  140   K  4.8  4.4   CL  105  106   CO2  24  23   BUN  16  16   CREATININE  1.10*  1.07*   GLUCOSE  108*  93     Hepatic: No results for input(s): AST, ALT, ALB, BILITOT, ALKPHOS in the last 72 hours. Troponin: No results for input(s): TROPONINI in the last 72 hours. BNP: No results for input(s): BNP in the last 72 hours. Lipids: No results for input(s): CHOL, HDL in the last 72 hours. Invalid input(s): LDLCALCU  ABGs: No results found for: PHART, PO2ART, [id number]  INR: No results for input(s): INR in the last 72 hours. -----------------------------------------------------------------  Diagnostic studies reviewed      Assessment    1. RLE cellulitis  2. RI  3. Anemia  4. HTN  5. HLD  6. GERD  7. Noncompliance    Patient Active Problem List   Diagnosis Code    Chronic pancreatitis ([facility] [address] K86.1    Essential hypertension I10    Hyperlipidemia E78.5    Psoriasis L40.9    Gastroesophageal reflux disease without esophagitis K21.9    Bell's palsy G51.0    Chronic deep vein thrombosis (DVT) of both lower extremities (HCC) I82.503    Tobacco abuse Z72.0    History of alcohol abuse Z87.898    Malignant neoplasm of upper-outer quadrant of left female breast ([address] C50.412    Obesity (BMI 30.0-34. 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    Delayed surgical wound healing T81.89XA    Cellulitis of right lower extremity L03.115    Anemia D64.9    Noncompliance Z91.19         Plan:  1. IV ATB's  2. Consult ID  3. Resume home meds  4. SQ Lovenox  5. PT/OT  6. SS for discharge planning  7.  See chart for further orders    Electronically signed by Jimbo Espana MD on 6/29/2017 at 7:56 AM

## 2017-06-29 NOTE — PROGRESS NOTES
Assistance:  (Pt cooks. Pt's daughter does the laundry.)  Active : No  Occupation: Retired  Leisure & Hobbies: Reading    Objective              ADL  Feeding: Independent  Grooming: Setup  UE Bathing: Setup  LE Bathing: Setup, Stand by assistance  UE Dressing: Setup (Pt wears hospital gown)  LE Dressing: Moderate assistance (Pt dependent for yenny /doffing footies - her feet are swollen. . Pt wears underpants)  Toileting:  (TBA)  Additional Comments: ADL done bedside , monique care done w pt standing next to bed.      UE Function  Hand Dominance  Hand Dominance: Right        LUE Strength  L Shoulder Flex: 4-/5  L Shoulder ABduction: 4-/5  L Elbow Flex: 4/5  L Elbow Ext: 4/5  L Wrist Flex: 4-/5  L Wrist Ext: 4/5  Left Hand Strength -  (lbs)  Handle Setting 2: 30  LUE Tone: Normotonic     LUE AROM (degrees)  LUE AROM : WFL        RUE Strength  R Shoulder Flex: 4-/5  R Shoulder ABduction: 4-/5  R Elbow Flex: 4/5  R Elbow Ext: 4/5  R Wrist Flex: 4-/5  R Wrist Ext:  (IV cap on dorsal side hand)   Right Hand Strength -  (lbs)  Handle Setting 2: 30  RUE Tone: Normotonic     RUE AROM (degrees)  RUE AROM : WFL          Fine Motor Skills  Coordination  Movements Are Fluid And Coordinated: Yes       Left Hand Strength - Pinch (lbs)  Lateral: 11  Palmar 3 point: 9     Right Hand Strength - Pinch (lbs)  Lateral: 11  Palmar 3 point: 8             Mobility  Supine to Sit: Independent  Sit to Supine: Independent       Balance  Sitting Balance: Independent (Pt sits up in bed, pt sits on EOB)  Standing Balance: Stand by assistance (SBA during monique care)  Standing Balance  Sit to stand: Stand by assistance  Stand to sit: Stand by assistance     Bed mobility  Rolling to Left: Independent  Rolling to Right: Independent  Supine to Sit: Independent  Sit to Supine: Independent     Transfers  Sit to stand: Stand by assistance  Stand to sit: Stand by assistance  Toilet Transfers  Toilet Transfer:  (TBA)      Assessment  Performance deficits / Impairments: Decreased strength, Decreased ADL status, Decreased functional mobility   Treatment Diagnosis: UB weakness, impaired ADL  Prognosis: Good  Decision Making: Low Complexity  History: Recent admit to hospital for swelling in her legs. Exam: ROM, MMT, dynamometer, pinch meter, selfcare  Assistance / Modification: none  Patient Education: OT POC  Barriers to Learning: none  REQUIRES OT FOLLOW UP: Yes  Discharge Recommendations: Home with assist PRN  Activity Tolerance: Patient Tolerated treatment well         G CODE  OT G-codes  Functional Limitation: Self care  Self Care Current Status (): At least 1 percent but less than 20 percent impaired, limited or restricted  Self Care Goal Status (): 0 percent impaired, limited or restricted    Goals  Patient Goals   Patient goals : To get the swelling out her legs and alleviate the pain she is having. Short term goals  Time Frame for Short term goals: 1 week  Short term goal 1: Pt will demonstrate modified to independence w ADLs(grooming,bathing, dressing)  Short term goal 2: Pt will participate in 20-30 minutes bilateral UE therapeutic exercises to increase bilateral UE strength 1/2 muscle grade to increase strength to complete selfcare  Short term goal 3: Pt will demonstrate modified independence to independence w ADL transfers.      Plan  Activity Tolerance  Activity Tolerance: Patient Tolerated treatment well  Safety Devices  Safety Devices in place: Yes  Type of devices: Patient at risk for falls, Left in bed, Call light within reach     Plan  Times per week: 5  Times per day: Daily  Current Treatment Recommendations: Strengthening, Self-Care / ADL, Functional Mobility Training  Plan Comment: continue OT       OT Individual Minutes  Time In: 0902  Time Out: 4863  Minutes: 39    El     06/29/17 0902   OT Individual Minutes   Time In [facility]   Minutes 36   Time Code Minutes    Timed Code Treatment Minutes 15 Minutes ctronically signed by Tierra Merritt OT on 6/29/17 at 11:55 AM

## 2017-06-29 NOTE — PLAN OF CARE
Problem: Pain:  Goal: Pain level will decrease  Pain level will decrease   Outcome: Ongoing  No complaints of pain at this time. Continue to assess. Problem: Risk for Impaired Skin Integrity  Goal: Tissue integrity - skin and mucous membranes  Structural intactness and normal physiological function of skin and  mucous membranes. Outcome: Ongoing  No new skin issues noted at this time. Continue to assesss. Problem: Falls - Risk of  Goal: Absence of falls  Outcome: Ongoing  Patient remains free from falls at this time. Bed in lowest position. Call light within reach.

## 2017-06-30 LAB
CREAT SERPL-MCNC: 1.2 MG/DL (ref 0.5–0.9)
GFR AFRICAN AMERICAN: 53 ML/MIN
GFR NON-AFRICAN AMERICAN: 44 ML/MIN
GFR SERPL CREATININE-BSD FRML MDRD: ABNORMAL ML/MIN/{1.73_M2}
GFR SERPL CREATININE-BSD FRML MDRD: ABNORMAL ML/MIN/{1.73_M2}

## 2017-06-30 PROCEDURE — 97110 THERAPEUTIC EXERCISES: CPT

## 2017-06-30 PROCEDURE — 82565 ASSAY OF CREATININE: CPT

## 2017-06-30 PROCEDURE — G0378 HOSPITAL OBSERVATION PER HR: HCPCS

## 2017-06-30 PROCEDURE — 6370000000 HC RX 637 (ALT 250 FOR IP): Performed by: FAMILY MEDICINE

## 2017-06-30 PROCEDURE — 6360000002 HC RX W HCPCS: Performed by: FAMILY MEDICINE

## 2017-06-30 PROCEDURE — 99233 SBSQ HOSP IP/OBS HIGH 50: CPT | Performed by: INTERNAL MEDICINE

## 2017-06-30 PROCEDURE — 2580000003 HC RX 258: Performed by: FAMILY MEDICINE

## 2017-06-30 PROCEDURE — 36415 COLL VENOUS BLD VENIPUNCTURE: CPT

## 2017-06-30 PROCEDURE — 2580000003 HC RX 258: Performed by: EMERGENCY MEDICINE

## 2017-06-30 PROCEDURE — 1200000000 HC SEMI PRIVATE

## 2017-06-30 PROCEDURE — 99232 SBSQ HOSP IP/OBS MODERATE 35: CPT | Performed by: FAMILY MEDICINE

## 2017-06-30 PROCEDURE — 6360000002 HC RX W HCPCS: Performed by: EMERGENCY MEDICINE

## 2017-06-30 RX ORDER — LISINOPRIL 10 MG/1
10 TABLET ORAL DAILY
Status: DISCONTINUED | OUTPATIENT
Start: 2017-06-30 | End: 2017-07-04 | Stop reason: HOSPADM

## 2017-06-30 RX ADMIN — VANCOMYCIN HYDROCHLORIDE 1000 MG: 1 INJECTION, POWDER, LYOPHILIZED, FOR SOLUTION INTRAVENOUS at 16:40

## 2017-06-30 RX ADMIN — HYDRALAZINE HYDROCHLORIDE 100 MG: 50 TABLET, FILM COATED ORAL at 07:58

## 2017-06-30 RX ADMIN — FUROSEMIDE 20 MG: 20 TABLET ORAL at 21:14

## 2017-06-30 RX ADMIN — PANTOPRAZOLE SODIUM 40 MG: 40 TABLET, DELAYED RELEASE ORAL at 08:05

## 2017-06-30 RX ADMIN — OXYCODONE HYDROCHLORIDE AND ACETAMINOPHEN 1 TABLET: 5; 325 TABLET ORAL at 16:49

## 2017-06-30 RX ADMIN — CLOPIDOGREL BISULFATE 75 MG: 75 TABLET ORAL at 07:58

## 2017-06-30 RX ADMIN — SIMVASTATIN 40 MG: 40 TABLET, FILM COATED ORAL at 21:14

## 2017-06-30 RX ADMIN — LISINOPRIL 10 MG: 10 TABLET ORAL at 14:28

## 2017-06-30 RX ADMIN — CARVEDILOL 25 MG: 25 TABLET, FILM COATED ORAL at 07:58

## 2017-06-30 RX ADMIN — Medication 10 ML: at 08:00

## 2017-06-30 RX ADMIN — CARVEDILOL 25 MG: 25 TABLET, FILM COATED ORAL at 16:40

## 2017-06-30 RX ADMIN — ENOXAPARIN SODIUM 40 MG: 40 INJECTION SUBCUTANEOUS at 07:59

## 2017-06-30 RX ADMIN — FUROSEMIDE 20 MG: 20 TABLET ORAL at 07:58

## 2017-06-30 RX ADMIN — Medication 10 ML: at 21:14

## 2017-06-30 RX ADMIN — HYDRALAZINE HYDROCHLORIDE 100 MG: 50 TABLET, FILM COATED ORAL at 14:28

## 2017-06-30 ASSESSMENT — PAIN DESCRIPTION - LOCATION: LOCATION: NECK

## 2017-06-30 ASSESSMENT — PAIN DESCRIPTION - PAIN TYPE: TYPE: ACUTE PAIN

## 2017-06-30 ASSESSMENT — PAIN DESCRIPTION - DESCRIPTORS: DESCRIPTORS: SORE

## 2017-06-30 ASSESSMENT — PAIN SCALES - GENERAL
PAINLEVEL_OUTOF10: 6
PAINLEVEL_OUTOF10: 10

## 2017-06-30 ASSESSMENT — PAIN DESCRIPTION - PROGRESSION: CLINICAL_PROGRESSION: NOT CHANGED

## 2017-06-30 NOTE — PLAN OF CARE
Problem: Falls - Risk of  Goal: Absence of falls  Outcome: Ongoing  Pt remains free from falls this shift. Call light and over bed table within reach. Bed in lowest position, wheels locked. Floor remains free from excessive clutter. Will continue to monitor.

## 2017-06-30 NOTE — PLAN OF CARE
Problem: Pain:  Goal: Pain level will decrease  Pain level will decrease   Outcome: Ongoing  Pain controlled with prn pain medications this shift. Problem: Risk for Impaired Skin Integrity  Goal: Tissue integrity - skin and mucous membranes  Structural intactness and normal physiological function of skin and  mucous membranes. Outcome: Ongoing  Zero obvious new skin issues so far this shift. Problem: Falls - Risk of  Goal: Absence of falls  Outcome: Ongoing  Patient remains free from falls/injuries at this time. Call light within reach.

## 2017-06-30 NOTE — PROGRESS NOTES
well-developed and well-nourished, in no acute distress  Skin: warm and dry, no rash or erythema  Head: normocephalic and atraumatic  Eyes: pupils equal, round,  conjunctivae normal  ENT: hearing grossly normal bilaterally  Neck: neck supple and non tender   Pulmonary/Chest: clear to auscultation bilaterally- no wheezes, rales or rhonchi, normal air movement, no respiratory distress  Cardiovascular: normal rate, regular rhythm, normal S1 and S2, no murmurs  Abdomen: soft, non-tender, non-distended, normal bowel sounds. Extremities: no cyanosis, clubbing   Right leg erythema ,edema and warmth extend from above ankle to the upper thigh,no change . Musculoskeletal: normal range of motion, no joint swelling, deformity or tenderness  Neurologic:  muscle strength normal     Data Review:         Recent Labs       06/28/17   1240  06/29/17   0713   WBC  7.4  6.3   HGB  10.0*  9.1*   HCT  31.3*  29.1*   MCV  67.5*  68.4*   PLT  460*  400           Recent Labs       06/28/17   1240  06/29/17   0713   NA  141  140   K  4.8  4.4   CL  105  106   CO2  24  23   BUN  16  16   CREATININE  1.10*  1.07*            Imaging Studies:                                      All appropriate imaging studies and reports reviewed: Yes     Venous doppler         Summary        No evidence of superficial or deep venous thrombosis in both lower    extremities.    Enlarged lymph nodes noted in the left groins.    Technically limited visualization.           Assessment:        Cellulitis of right lower extremity with lymphedema. Essential hypertension    Hyperlipidemia    Gastroesophageal reflux disease without esophagitis    Tobacco abuse    Obesity (BMI 30.0-34. 9)    Acute kidney injury ([address]    Anemia    Noncompliance    CKD (chronic kidney disease) stage 3, GFR 30-59 ml/min           Recommendations:   Cont  IV Vancomycin ,monitor level and renal function closely . Leg elevation and compression .   CT leg without contrast r/o underlying abscess   Add cefepime.

## 2017-06-30 NOTE — PROGRESS NOTES
[address]   INPATIENT OCCUPATIONAL THERAPY  PROGRESS NOTE  Date: 2017  Patient Name: Taye Dillard      Room: 0507/3122-78  MRN: [id number]    : 1940  (67 y.o.) Gender: female   Referring Practitioner: Dr Uriah Gomez  Diagnosis: Cellulitis    Restrictions  Restrictions/Precautions: Fall Risk  Implants present? : Metal implants (R TKA)       Subjective  Subjective: \"I need you get this thing out of my neck. \" referring to IV  Comments: Pt alert, fatigues quickly  Patient Currently in Pain: Yes  Pain Level: 10  Pain Location: Neck (at IV site)  Overall Orientation Status: Within Functional Limits       Objective     Bed mobility  Rolling to Left: Independent  Rolling to Right: Independent  Supine to Sit: Independent  Sit to Supine: Independent  Scooting: Independent  Balance  Sitting Balance: Independent (tolerated sitting EOB x 21 minutes)  Standing Balance  Time: ~1 minute c cane  Activity: functional mobility  Sit to stand: Modified independent  Stand to sit: Modified independent  Comment: utilizes cane for steadying,  Functional Mobility  Functional - Mobility Device: Cane  Activity: To/from bathroom; Other (room mobility)  Assist Level: Modified independent   Functional Mobility Comments: utilizes cane for steadying,         Transfers  Sit to stand: Modified independent  Stand to sit: Modified independent  Toilet Transfers  Toilet - Technique: Ambulating  Equipment Used: Raised toilet seat with rails  Toilet Transfer: Modified independent  Toilet Transfers Comments: Pt completed transfer safely  Upper Extremity Function    UE Strengthing: BUE, 3# bar in all planes x 10 reps to support mobility. extended time between set 2* fatigue. (HEP c yellow theraband x 10 reps, completed shulder flexion/extension only.  pt has long fingernails and wer \"digging\" into palms of hands)       Assessment  Performance deficits / Impairments: Decreased strength;Decreased ADL status; Decreased functional mobility   Prognosis: Good  Discharge Recommendations: Home with assist PRN  Activity Tolerance: Patient Tolerated treatment well  Safety Devices in place: Yes  Type of devices: Patient at risk for falls; Left in bed;Call light within reach       Patient Education:  Patient Education: OT POC, HEP,  Learner:patient  Method: demonstration and explanation       Outcome: acknowledged understanding and demonstrated understanding     Plan  Continue current plan of care    Goals  Short term goals  Time Frame for Short term goals: 1 week  Short term goal 1: Pt will demonstrate modified to independence w ADLs(grooming,bathing, dressing)  Short term goal 2: Pt will participate in 20-30 minutes bilateral UE therapeutic exercises to increase bilateral UE strength 1/2 muscle grade to increase strength to complete selfcare  Short term goal 3: Pt will demonstrate modified independence to independence w ADL transfers.      OT Individual Minutes  Time In: 0150  Time Out: 1862  Minutes: 27      Electronically signed by MADISON Arreguin on 6/30/17 at 9:22 AM

## 2017-06-30 NOTE — PROGRESS NOTES
Progress Note    6/30/2017 12:47 PM  Subjective:   Admit Date: 6/28/2017  PCP: Sesar Landry PA-C  Interval History: No new complaints. Pt denies any fever or chills. Pt denies any N/V/D/C or abdominal pain. Pt today denies any HA, chest pain, or SOB. Diet: DIET GENERAL;  Medications:   Scheduled Meds:   lisinopril  10 mg Oral Daily    vancomycin (VANCOCIN) intermittent dosing (placeholder)   Other RX Placeholder    sodium chloride flush  10 mL Intravenous 2 times per day    carvedilol  25 mg Oral BID WC    clopidogrel  75 mg Oral Daily    docusate sodium  100 mg Oral BID    furosemide  20 mg Oral BID    hydrALAZINE  100 mg Oral TID    pantoprazole  40 mg Oral Daily    simvastatin  40 mg Oral Nightly    enoxaparin  40 mg Subcutaneous Daily    vancomycin  1,000 mg Intravenous Q24H     Continuous Infusions:     Recent Results (from the past 24 hour(s))   Cult,Blood    Collection Time: 06/29/17  2:50 PM   Result Value Ref Range    Specimen Description       . BLOOD RED 2ML PURPLE 4ML BOTH RIGHT AC Performed at [facility]    Specimen Description        [facility]O NAT[facility] - [facility]UM[facility] [address] [phone]    Special Requests       [facility] Performed at [facility]: SANDRA CRUZA W [address] Special Requests  [address] [phone]     Culture NO GROWTH 10 HOURS     Culture       Performed at [facility] [address], [address] [phone]    Status Pending    Cult,Blood    Collection Time: 06/29/17  3:30 PM   Result Value Ref Range    Specimen Description       . BLOOD RED 5ML PURPLE 5ML BOTH RIGHT AC Performed at [facility]    Specimen Description        KEN[facility] FI[facility] - [facility] [address] [phone]    Special Requests       [facility] Performed at [facility]: SANDRA SANCHEZNDA W [address]     Special Requests  [address] [phone]     Culture NO GROWTH 10 HOURS     Culture       Performed at [facility] [address] [facility], [address] [place] [address] [phone]    Status Pending    CREATININE, SERUM    Collection Time: 06/30/17  7:15 AM   Result Value Ref Range    CREATININE 1.20 (H) 0.50 - 0.90 mg/dL    GFR Non- 44 (L) >60 mL/min    GFR  53 (L) >60 mL/min    GFR Comment          GFR Staging NOT REPORTED        ROS: As per HPI. Rest of ROS is negative    Objective:   Vitals: BP (!) 150/75  Pulse 73  Temp 98.2 °F (36.8 °C) (Oral)   Resp 18  Ht 5' (1.524 m)  Wt 155 lb 3.3 oz (70.4 kg)  SpO2 90%  BMI 30.31 kg/m2  General appearance: alert and cooperative with exam  Lungs: clear to auscultation bilaterally  Heart: regular rate and rhythm, S1, S2 normal, no murmur, click, rub or gallop  Abdomen: soft, non-tender; bowel sounds normal; no masses,  no organomegaly  Extremities: edema 1+ edema LLE and 3+ edema RLE with blistering and Homans sign is negative, no sign of DVT  Neurologic: Mental status: Alert, oriented, thought content appropriate    Assessment    Principal Problem:    Cellulitis of right lower extremity  Active Problems:    Essential hypertension    Hyperlipidemia    Gastroesophageal reflux disease without esophagitis    Tobacco abuse    Obesity (BMI 30.0-34. 9)    Acute kidney injury ([address]    Anemia    Noncompliance    CKD (chronic kidney disease) stage 3, GFR 30-59 ml/min        Patient Active Problem List:     Chronic pancreatitis ([address]     Essential hypertension     Hyperlipidemia     Psoriasis     Gastroesophageal reflux disease without esophagitis     Bell's palsy     Chronic deep vein thrombosis (DVT) of both lower extremities (HCC)     Tobacco abuse     History of alcohol abuse     Malignant neoplasm of upper-outer quadrant of left female breast (HCC)     Obesity (BMI 30.0-34. 9)     Anemia associated with acute blood loss     Hematoma (nontraumatic) of breast     Local infection of skin and subcutaneous tissue     Pseudomonas aeruginosa infection Anorexia     Orthostatic dizziness     Hyperkalemia     Acute kidney injury ([facility]     Alcohol-induced chronic pancreatitis ([facility]     Delayed surgical wound healing     Cellulitis of right lower extremity     Anemia     Noncompliance     CKD (chronic kidney disease) stage 3, GFR 30-59 ml/min      Plan:    1. Cont IV Vancomycin - Pharmacy to dose    2. Start Lisinopril 10mg daily for better BP control    3. Cont Lasix 20mg BID    4. Monitor labs    5. PT/OT    6.  SS for discharge planning      Electronically signed by Arminda Jamison MD on 6/30/2017 at 12:47 PM

## 2017-07-01 ENCOUNTER — APPOINTMENT (OUTPATIENT)
Dept: CT IMAGING | Age: 77
DRG: 603 | End: 2017-07-01
Payer: MEDICARE

## 2017-07-01 PROBLEM — L03.90 CELLULITIS: Status: ACTIVE | Noted: 2017-06-28

## 2017-07-01 LAB
ANION GAP SERPL CALCULATED.3IONS-SCNC: 11 MMOL/L (ref 9–17)
BUN BLDV-MCNC: 13 MG/DL (ref 8–23)
BUN/CREAT BLD: ABNORMAL (ref 9–20)
CALCIUM SERPL-MCNC: 8.6 MG/DL (ref 8.6–10.4)
CHLORIDE BLD-SCNC: 102 MMOL/L (ref 98–107)
CO2: 26 MMOL/L (ref 20–31)
CREAT SERPL-MCNC: 1.12 MG/DL (ref 0.5–0.9)
GFR AFRICAN AMERICAN: 57 ML/MIN
GFR NON-AFRICAN AMERICAN: 47 ML/MIN
GFR SERPL CREATININE-BSD FRML MDRD: ABNORMAL ML/MIN/{1.73_M2}
GFR SERPL CREATININE-BSD FRML MDRD: ABNORMAL ML/MIN/{1.73_M2}
GLUCOSE BLD-MCNC: 84 MG/DL (ref 70–99)
HCT VFR BLD CALC: 29.8 % (ref 36–46)
HEMOGLOBIN: 9.7 G/DL (ref 12–16)
MCH RBC QN AUTO: 22.2 PG (ref 26–34)
MCHC RBC AUTO-ENTMCNC: 32.4 G/DL (ref 31–37)
MCV RBC AUTO: 68.5 FL (ref 80–100)
PDW BLD-RTO: 17 % (ref 11.5–14.9)
PLATELET # BLD: 406 K/UL (ref 150–450)
PMV BLD AUTO: 8.3 FL (ref 6–12)
POTASSIUM SERPL-SCNC: 4 MMOL/L (ref 3.7–5.3)
RBC # BLD: 4.35 M/UL (ref 4–5.2)
SODIUM BLD-SCNC: 139 MMOL/L (ref 135–144)
VANCOMYCIN TROUGH DATE LAST DOSE: NORMAL
VANCOMYCIN TROUGH DOSE AMOUNT: 1000
VANCOMYCIN TROUGH TIME LAST DOSE: 1640
VANCOMYCIN TROUGH: 16.9 UG/ML (ref 10–20)
WBC # BLD: 6.3 K/UL (ref 3.5–11)

## 2017-07-01 PROCEDURE — 1200000000 HC SEMI PRIVATE

## 2017-07-01 PROCEDURE — 36415 COLL VENOUS BLD VENIPUNCTURE: CPT

## 2017-07-01 PROCEDURE — 6360000002 HC RX W HCPCS: Performed by: INTERNAL MEDICINE

## 2017-07-01 PROCEDURE — G0378 HOSPITAL OBSERVATION PER HR: HCPCS

## 2017-07-01 PROCEDURE — 80202 ASSAY OF VANCOMYCIN: CPT

## 2017-07-01 PROCEDURE — 6370000000 HC RX 637 (ALT 250 FOR IP): Performed by: FAMILY MEDICINE

## 2017-07-01 PROCEDURE — 6360000002 HC RX W HCPCS: Performed by: EMERGENCY MEDICINE

## 2017-07-01 PROCEDURE — 6360000002 HC RX W HCPCS: Performed by: FAMILY MEDICINE

## 2017-07-01 PROCEDURE — 80048 BASIC METABOLIC PNL TOTAL CA: CPT

## 2017-07-01 PROCEDURE — 2580000003 HC RX 258: Performed by: INTERNAL MEDICINE

## 2017-07-01 PROCEDURE — 73700 CT LOWER EXTREMITY W/O DYE: CPT

## 2017-07-01 PROCEDURE — 2580000003 HC RX 258: Performed by: EMERGENCY MEDICINE

## 2017-07-01 PROCEDURE — 99233 SBSQ HOSP IP/OBS HIGH 50: CPT | Performed by: FAMILY MEDICINE

## 2017-07-01 PROCEDURE — 85027 COMPLETE CBC AUTOMATED: CPT

## 2017-07-01 PROCEDURE — 2580000003 HC RX 258: Performed by: FAMILY MEDICINE

## 2017-07-01 RX ADMIN — CEFEPIME 1 G: 1 INJECTION, POWDER, FOR SOLUTION INTRAMUSCULAR; INTRAVENOUS at 13:40

## 2017-07-01 RX ADMIN — HYDRALAZINE HYDROCHLORIDE 100 MG: 50 TABLET, FILM COATED ORAL at 21:00

## 2017-07-01 RX ADMIN — OXYCODONE HYDROCHLORIDE AND ACETAMINOPHEN 1 TABLET: 5; 325 TABLET ORAL at 13:47

## 2017-07-01 RX ADMIN — HYDRALAZINE HYDROCHLORIDE 100 MG: 50 TABLET, FILM COATED ORAL at 14:26

## 2017-07-01 RX ADMIN — PANTOPRAZOLE SODIUM 40 MG: 40 TABLET, DELAYED RELEASE ORAL at 08:30

## 2017-07-01 RX ADMIN — HYDRALAZINE HYDROCHLORIDE 100 MG: 50 TABLET, FILM COATED ORAL at 08:29

## 2017-07-01 RX ADMIN — Medication 10 ML: at 21:04

## 2017-07-01 RX ADMIN — HYDRALAZINE HYDROCHLORIDE 100 MG: 50 TABLET, FILM COATED ORAL at 00:41

## 2017-07-01 RX ADMIN — CLOPIDOGREL BISULFATE 75 MG: 75 TABLET ORAL at 08:30

## 2017-07-01 RX ADMIN — OXYCODONE HYDROCHLORIDE AND ACETAMINOPHEN 2 TABLET: 5; 325 TABLET ORAL at 21:00

## 2017-07-01 RX ADMIN — LISINOPRIL 10 MG: 10 TABLET ORAL at 08:31

## 2017-07-01 RX ADMIN — VANCOMYCIN HYDROCHLORIDE 1000 MG: 1 INJECTION, POWDER, LYOPHILIZED, FOR SOLUTION INTRAVENOUS at 16:47

## 2017-07-01 RX ADMIN — CARVEDILOL 25 MG: 25 TABLET, FILM COATED ORAL at 19:15

## 2017-07-01 RX ADMIN — ENOXAPARIN SODIUM 40 MG: 40 INJECTION SUBCUTANEOUS at 08:30

## 2017-07-01 RX ADMIN — Medication 10 ML: at 08:42

## 2017-07-01 RX ADMIN — DOCUSATE SODIUM 100 MG: 100 CAPSULE, LIQUID FILLED ORAL at 21:00

## 2017-07-01 RX ADMIN — CARVEDILOL 25 MG: 25 TABLET, FILM COATED ORAL at 08:29

## 2017-07-01 RX ADMIN — Medication 10 ML: at 19:44

## 2017-07-01 RX ADMIN — SIMVASTATIN 40 MG: 40 TABLET, FILM COATED ORAL at 21:00

## 2017-07-01 RX ADMIN — FUROSEMIDE 20 MG: 20 TABLET ORAL at 21:00

## 2017-07-01 RX ADMIN — FUROSEMIDE 20 MG: 20 TABLET ORAL at 08:30

## 2017-07-01 ASSESSMENT — PAIN DESCRIPTION - ORIENTATION: ORIENTATION: RIGHT

## 2017-07-01 ASSESSMENT — PAIN SCALES - GENERAL
PAINLEVEL_OUTOF10: 5
PAINLEVEL_OUTOF10: 7
PAINLEVEL_OUTOF10: 0

## 2017-07-01 ASSESSMENT — PAIN DESCRIPTION - LOCATION: LOCATION: LEG

## 2017-07-01 ASSESSMENT — ENCOUNTER SYMPTOMS
ABDOMINAL PAIN: 0
BLOOD IN STOOL: 0
SHORTNESS OF BREATH: 0

## 2017-07-01 NOTE — PROGRESS NOTES
Katie Wiley MD   Stopped at 06/30/17 1843       Intake/Output Summary (Last 24 hours) at 07/01/17 0714  Last data filed at 06/30/17 0800   Gross per 24 hour   Intake               10 ml   Output                0 ml   Net               10 ml     Recent Results (from the past 24 hour(s))   CREATININE, SERUM    Collection Time: 06/30/17  7:15 AM   Result Value Ref Range    CREATININE 1.20 (H) 0.50 - 0.90 mg/dL    GFR Non- 44 (L) >60 mL/min    GFR  53 (L) >60 mL/min    GFR Comment          GFR Staging NOT REPORTED      -----------------------------------------------------------------  RAD:  EKG:  Micro:     Assessment & Plan:    Patient Active Problem List:     Chronic pancreatitis ([address]     Essential hypertension     Hyperlipidemia     Psoriasis     Gastroesophageal reflux disease without esophagitis     Bell's palsy     Chronic deep vein thrombosis (DVT) of both lower extremities (HCC)     Tobacco abuse     History of alcohol abuse     Malignant neoplasm of upper-outer quadrant of left female breast (HCC)     Obesity (BMI 30.0-34. 9)     Anemia associated with acute blood loss     Hematoma (nontraumatic) of breast     Local infection of skin and subcutaneous tissue     Pseudomonas aeruginosa infection     Anorexia     Orthostatic dizziness     Hyperkalemia     Acute kidney injury ([address]     Alcohol-induced chronic pancreatitis ([address]     Delayed surgical wound healing     Cellulitis of right lower extremity     Anemia     Noncompliance     CKD (chronic kidney disease) stage 3, GFR 30-59 ml/min           Plan:  Continue current treatments. Awaiting ID recommendations. Complete orders per chart.     See orders   Disposition:    Electronically signed by Forest Habermann, MD on 7/1/2017 at 7:14 AM

## 2017-07-01 NOTE — PROGRESS NOTES
Pharmacy Vancomycin Consult     Vancomycin Day: 4  Current Dosin mg Q24H    Temp max:  37C    Recent Labs      17   0713  17   0653   BUN  16  13       Recent Labs      17   0715  17   0653   CREATININE  1.20*  1.12*       Recent Labs      17   0713  17   0653   WBC  6.3  6.3       No intake or output data in the 24 hours ending 17 1610                                  Ht Readings from Last 1 Encounters:   17 5' (1.524 m)        Wt Readings from Last 1 Encounters:   17 158 lb 15.2 oz (72.1 kg)         Body mass index is 31.04 kg/(m^2). Estimated Creatinine Clearance: 37 mL/min (based on Cr of 1.12).     Trough: 16.9    Assessment/Plan:  Cont Vanco 1 GM Q24H  Sallie Broderick, MS   2017  4:11 PM

## 2017-07-01 NOTE — CARE COORDINATION
ONGOING DISCHARGE PLAN:    Spoke with patient regarding discharge plan and patient confirms that plan is still to return to home with  [address] and possible home IV antibiotics. Will continue to follow - may need to send referral to [facility].     Will continue to follow for additional discharge needs.     Electronically signed by Marissa Colin RN on 7/1/2017 at 11:48 AM

## 2017-07-01 NOTE — PLAN OF CARE
Problem: Pain:  Goal: Pain level will decrease  Pain level will decrease   Outcome: Ongoing  Adequate pain control achieved this shift. Patient able to rate pain using a pain scale of 0-10    Problem: Risk for Impaired Skin Integrity  Goal: Tissue integrity - skin and mucous membranes  Structural intactness and normal physiological function of skin and  mucous membranes. Outcome: Ongoing  No new skin breakdown noted. See head-to-toe assessment. Problem: Falls - Risk of  Goal: Absence of falls  Outcome: Met This Shift  Pt remains free of falls. Call light within reach. Bed in lowest position. Nonskid footwear applied. Bedwheels locked. Problem: Mobility - Impaired:  Goal: Mobility will improve to maximum level  Mobility will improve to maximum level   Outcome: Ongoing  Gait steady.  Will continue to monitor

## 2017-07-01 NOTE — DISCHARGE INSTR - DIET

## 2017-07-02 LAB
ANION GAP SERPL CALCULATED.3IONS-SCNC: 12 MMOL/L (ref 9–17)
BUN BLDV-MCNC: 12 MG/DL (ref 8–23)
BUN/CREAT BLD: ABNORMAL (ref 9–20)
CALCIUM SERPL-MCNC: 8.5 MG/DL (ref 8.6–10.4)
CHLORIDE BLD-SCNC: 103 MMOL/L (ref 98–107)
CO2: 26 MMOL/L (ref 20–31)
CREAT SERPL-MCNC: 1.12 MG/DL (ref 0.5–0.9)
GFR AFRICAN AMERICAN: 57 ML/MIN
GFR NON-AFRICAN AMERICAN: 47 ML/MIN
GFR SERPL CREATININE-BSD FRML MDRD: ABNORMAL ML/MIN/{1.73_M2}
GFR SERPL CREATININE-BSD FRML MDRD: ABNORMAL ML/MIN/{1.73_M2}
GLUCOSE BLD-MCNC: 92 MG/DL (ref 70–99)
HCT VFR BLD CALC: 28.6 % (ref 36–46)
HEMOGLOBIN: 9.1 G/DL (ref 12–16)
MCH RBC QN AUTO: 22 PG (ref 26–34)
MCHC RBC AUTO-ENTMCNC: 31.9 G/DL (ref 31–37)
MCV RBC AUTO: 69 FL (ref 80–100)
PDW BLD-RTO: 17 % (ref 11.5–14.9)
PLATELET # BLD: 364 K/UL (ref 150–450)
PMV BLD AUTO: 8.1 FL (ref 6–12)
POTASSIUM SERPL-SCNC: 3.9 MMOL/L (ref 3.7–5.3)
RBC # BLD: 4.15 M/UL (ref 4–5.2)
SODIUM BLD-SCNC: 141 MMOL/L (ref 135–144)
WBC # BLD: 5.7 K/UL (ref 3.5–11)

## 2017-07-02 PROCEDURE — 1200000000 HC SEMI PRIVATE

## 2017-07-02 PROCEDURE — 6360000002 HC RX W HCPCS: Performed by: INTERNAL MEDICINE

## 2017-07-02 PROCEDURE — 99233 SBSQ HOSP IP/OBS HIGH 50: CPT | Performed by: INTERNAL MEDICINE

## 2017-07-02 PROCEDURE — 6370000000 HC RX 637 (ALT 250 FOR IP): Performed by: FAMILY MEDICINE

## 2017-07-02 PROCEDURE — 6360000002 HC RX W HCPCS: Performed by: FAMILY MEDICINE

## 2017-07-02 PROCEDURE — 2580000003 HC RX 258: Performed by: INTERNAL MEDICINE

## 2017-07-02 PROCEDURE — 99232 SBSQ HOSP IP/OBS MODERATE 35: CPT | Performed by: FAMILY MEDICINE

## 2017-07-02 PROCEDURE — 6360000002 HC RX W HCPCS: Performed by: EMERGENCY MEDICINE

## 2017-07-02 PROCEDURE — 2580000003 HC RX 258: Performed by: FAMILY MEDICINE

## 2017-07-02 PROCEDURE — 2580000003 HC RX 258: Performed by: EMERGENCY MEDICINE

## 2017-07-02 PROCEDURE — 36415 COLL VENOUS BLD VENIPUNCTURE: CPT

## 2017-07-02 PROCEDURE — 80048 BASIC METABOLIC PNL TOTAL CA: CPT

## 2017-07-02 PROCEDURE — 85027 COMPLETE CBC AUTOMATED: CPT

## 2017-07-02 PROCEDURE — G0378 HOSPITAL OBSERVATION PER HR: HCPCS

## 2017-07-02 RX ADMIN — Medication 10 ML: at 21:42

## 2017-07-02 RX ADMIN — OXYCODONE HYDROCHLORIDE AND ACETAMINOPHEN 1 TABLET: 5; 325 TABLET ORAL at 10:04

## 2017-07-02 RX ADMIN — SIMVASTATIN 40 MG: 40 TABLET, FILM COATED ORAL at 21:42

## 2017-07-02 RX ADMIN — CARVEDILOL 25 MG: 25 TABLET, FILM COATED ORAL at 10:04

## 2017-07-02 RX ADMIN — FUROSEMIDE 20 MG: 20 TABLET ORAL at 10:04

## 2017-07-02 RX ADMIN — ENOXAPARIN SODIUM 40 MG: 40 INJECTION SUBCUTANEOUS at 10:05

## 2017-07-02 RX ADMIN — FUROSEMIDE 20 MG: 20 TABLET ORAL at 21:41

## 2017-07-02 RX ADMIN — CLOPIDOGREL BISULFATE 75 MG: 75 TABLET ORAL at 10:04

## 2017-07-02 RX ADMIN — HYDRALAZINE HYDROCHLORIDE 100 MG: 50 TABLET, FILM COATED ORAL at 21:41

## 2017-07-02 RX ADMIN — CEFEPIME 1 G: 1 INJECTION, POWDER, FOR SOLUTION INTRAMUSCULAR; INTRAVENOUS at 15:17

## 2017-07-02 RX ADMIN — VANCOMYCIN HYDROCHLORIDE 1000 MG: 1 INJECTION, POWDER, LYOPHILIZED, FOR SOLUTION INTRAVENOUS at 16:17

## 2017-07-02 RX ADMIN — CARVEDILOL 25 MG: 25 TABLET, FILM COATED ORAL at 16:20

## 2017-07-02 RX ADMIN — HYDRALAZINE HYDROCHLORIDE 100 MG: 50 TABLET, FILM COATED ORAL at 10:04

## 2017-07-02 RX ADMIN — LISINOPRIL 10 MG: 10 TABLET ORAL at 10:04

## 2017-07-02 RX ADMIN — PANTOPRAZOLE SODIUM 40 MG: 40 TABLET, DELAYED RELEASE ORAL at 10:04

## 2017-07-02 RX ADMIN — OXYCODONE HYDROCHLORIDE AND ACETAMINOPHEN 2 TABLET: 5; 325 TABLET ORAL at 21:42

## 2017-07-02 RX ADMIN — CEFEPIME 1 G: 1 INJECTION, POWDER, FOR SOLUTION INTRAMUSCULAR; INTRAVENOUS at 03:04

## 2017-07-02 ASSESSMENT — PAIN SCALES - GENERAL
PAINLEVEL_OUTOF10: 9
PAINLEVEL_OUTOF10: 2
PAINLEVEL_OUTOF10: 10
PAINLEVEL_OUTOF10: 0

## 2017-07-02 ASSESSMENT — PAIN DESCRIPTION - FREQUENCY
FREQUENCY: INTERMITTENT
FREQUENCY: INTERMITTENT

## 2017-07-02 ASSESSMENT — PAIN DESCRIPTION - LOCATION
LOCATION: LEG
LOCATION: LEG

## 2017-07-02 ASSESSMENT — PAIN DESCRIPTION - PAIN TYPE
TYPE: ACUTE PAIN
TYPE: ACUTE PAIN

## 2017-07-02 ASSESSMENT — PAIN DESCRIPTION - DESCRIPTORS
DESCRIPTORS: ACHING;DISCOMFORT
DESCRIPTORS: ACHING;DISCOMFORT

## 2017-07-02 ASSESSMENT — PAIN DESCRIPTION - ONSET
ONSET: GRADUAL
ONSET: GRADUAL

## 2017-07-02 NOTE — PROGRESS NOTES
FAMILY MEDICINE  - PROGRESS NOTE    Date:  7/2/2017  Lore Dillard  [id number]      Chief Complaint   Patient presents with    Leg Swelling         Interval History:  Improved slightly, her leg still hurts some. She has no new complaints. Family at bedside.       Subjective  Cardiovascular: positive for lower extremity edema  Gastrointestinal: positive for reflux symptoms  Integument/breast: positive for skin lesion(s)  Musculoskeletal:positive for arthralgias and myalgias  Behavioral/Psych: positive for obesity:    Objective:    BP (!) 157/63  Pulse 65  Temp 97.7 °F (36.5 °C) (Oral)   Resp 16  Ht 5' (1.524 m)  Wt 158 lb 15.2 oz (72.1 kg)  SpO2 94%  BMI 31.04 kg/m2  General appearance - alert, well appearing, and in no distress and overweight  Mental status - alert, oriented to person, place, and time  Eyes - pupils equal and reactive, extraocular eye movements intact  Ears - hearing grossly normal bilaterally  Nose - normal and patent, no erythema, discharge or polyps  Mouth - mucous membranes moist, pharynx normal without lesions  Neck - supple, no significant adenopathy  Lymphatics - no palpable lymphadenopathy, no hepatosplenomegaly  Chest - clear to auscultation, no wheezes, rales or rhonchi, symmetric air entry  Heart - normal rate, regular rhythm, normal S1, S2, no murmurs, rubs, clicks or gallops  Abdomen - soft, nontender, nondistended, no masses or organomegaly  Breasts - not examined  Back exam - not examined  Neurological - alert, oriented, normal speech, no focal findings or movement disorder noted  Musculoskeletal - osteoarthritic changes noted in both hands  Extremities - pedal edema 1 +  Skin - DERMATITIS NOTED: cellulitis of leg    Data:   Medications:   Current Facility-Administered Medications   Medication Dose Route Frequency Provider Last Rate Last Dose    cefepime (MAXIPIME) 1 g IVPB minibag  1 g Intravenous Q12H Erlinda Pa MD Stopped at 07/02/17 0543    lisinopril (PRINIVIL;ZESTRIL) tablet 10 mg  10 mg Oral Daily Suzanne Puentes MD   10 mg at 07/01/17 0831    vancomycin (VANCOCIN) intermittent dosing (placeholder)   Other RX Placeholder Shilpi Cruz MD        sodium chloride flush 0.9 % injection 10 mL  10 mL Intravenous 2 times per day Suzanne Puentes MD   10 mL at 07/01/17 2104    sodium chloride flush 0.9 % injection 10 mL  10 mL Intravenous PRN Suzanne Puentes MD   10 mL at 07/01/17 1944    acetaminophen (TYLENOL) tablet 650 mg  650 mg Oral Q4H PRN Suzanne Puentes MD        oxyCODONE-acetaminophen (PERCOCET) 5-325 MG per tablet 1 tablet  1 tablet Oral Q4H PRN Suzanne Puentes MD   1 tablet at 07/01/17 1347    Or    oxyCODONE-acetaminophen (PERCOCET) 5-325 MG per tablet 2 tablet  2 tablet Oral Q4H PRN Suzanne Puentes MD   2 tablet at 07/01/17 2100    acetaminophen-codeine 120-12 MG/5ML solution 10 mL  10 mL Oral Q8H PRN Suzanne Puentes MD        carvedilol (COREG) tablet 25 mg  25 mg Oral BID WC Suzanne Puentes MD   25 mg at 07/01/17 1915    clopidogrel (PLAVIX) tablet 75 mg  75 mg Oral Daily Suzanne Puentes MD   75 mg at 07/01/17 0830    docusate sodium (COLACE) capsule 100 mg  100 mg Oral BID Suzanne Puentes MD   100 mg at 07/01/17 2100    furosemide (LASIX) tablet 20 mg  20 mg Oral BID Suzanne Puentes MD   20 mg at 07/01/17 2100    hydrALAZINE (APRESOLINE) tablet 100 mg  100 mg Oral TID Suzanne Puentes MD   100 mg at 07/01/17 2100    pantoprazole (PROTONIX) tablet 40 mg  40 mg Oral Daily Suzanne Puentes MD   40 mg at 07/01/17 0830    simvastatin (ZOCOR) tablet 40 mg  40 mg Oral Nightly Suzanne Puentes MD   40 mg at 07/01/17 2100    enoxaparin (LOVENOX) injection 40 mg  40 mg Subcutaneous Daily Suzanne Puentes MD   40 mg at 07/01/17 0830    ondansetron (ZOFRAN) injection 4 mg  4 mg Intravenous Q4H PRN Suzanne Puentes MD        vancomycin 1000 mg IVPB in 250 mL D5W addavial  1,000 mg Intravenous Q24H Bill Sams MD   Stopped at 07/01/17 1915       Intake/Output Summary (Last 24 hours) at 07/02/17 1005  Last data filed at 07/01/17 1842   Gross per 24 hour   Intake              950 ml   Output                0 ml   Net              950 ml     Recent Results (from the past 24 hour(s))   VANCOMYCIN, TROUGH    Collection Time: 07/01/17  3:03 PM   Result Value Ref Range    Vancomycin Tr 16.9 10.0 - 20.0 ug/mL    Vancomycin Trough Dose amount 1000     Vancomycin Trough Date last dose [id number]     Vancomycin Trough Time last dose 6463    Basic Metabolic Panel    Collection Time: 07/02/17  5:40 AM   Result Value Ref Range    Glucose 92 70 - 99 mg/dL    BUN 12 8 - 23 mg/dL    CREATININE 1.12 (H) 0.50 - 0.90 mg/dL    Bun/Cre Ratio NOT REPORTED 9 - 20    Calcium 8.5 (L) 8.6 - 10.4 mg/dL    Sodium 141 135 - 144 mmol/L    Potassium 3.9 3.7 - 5.3 mmol/L    Chloride 103 98 - 107 mmol/L    CO2 26 20 - 31 mmol/L    Anion Gap 12 9 - 17 mmol/L    GFR Non-African American 47 (L) >60 mL/min    GFR  57 (L) >60 mL/min    GFR Comment          GFR Staging NOT REPORTED    CBC    Collection Time: 07/02/17  5:40 AM   Result Value Ref Range    WBC 5.7 3.5 - 11.0 k/uL    RBC 4.15 4.0 - 5.2 m/uL    Hemoglobin 9.1 (L) 12.0 - 16.0 g/dL    Hematocrit 28.6 (L) 36 - 46 %    MCV 69.0 (L) 80 - 100 fL    MCH 22.0 (L) 26 - 34 pg    MCHC 31.9 31 - 37 g/dL    RDW 17.0 (H) 11.5 - 14.9 %    Platelets 472 187 - 580 k/uL    MPV 8.1 6.0 - 12.0 fL     -----------------------------------------------------------------  RAD:  EKG:  Micro:     Assessment & Plan:    Patient Active Problem List:     Chronic pancreatitis ([facility] [address]     Essential hypertension     Hyperlipidemia     Psoriasis     Gastroesophageal reflux disease without esophagitis     Bell's palsy     Chronic deep vein thrombosis (DVT) of both lower extremities (HCC)     Tobacco abuse     History of alcohol abuse     Malignant neoplasm of upper-outer quadrant of left female breast

## 2017-07-02 NOTE — PROGRESS NOTES
Infectious disease Consult Note        Patient: Coleen Dillard                                            : 1940                                        Acct#:  [id number]      Date:  2017    Subjective:      History of Present Illness  Patient is a 67 y.o.  female admitted with Cellulitis who is seen in consult for the same. She presented with increase right leg pain and swelling for 1 week . She denied fever or chills . She is feeling better today .      Past Medical History         Past Medical History:   Diagnosis Date    Abnormal kidney function       PT. RELATES UNAWARE OF.  Acid reflux disease      Anesthesia       'needs to be asleep when ET tube removed due  to severe gastric reflux will make me have hard time breathing. \"     Arthritis      Bell's palsy      Cancer (HCC)       Breast, lt.  Cerebrovascular disease       pt denies stroke she says she had bells palsy    COPD (chronic obstructive pulmonary disease) ([facility] [address]       patient is unaware    Deep vein thrombosis (DVT) (HCC)       multiple    Edema       RT. LEG.  History of cancer of left breast      Hx of blood clots       dvt scott. legs.     Hyperlipidemia      Hypertension      Pancreatitis      Wears dentures       UPPER    Wears glasses            Past Surgical History          Past Surgical History:   Procedure Laterality Date    APPENDECTOMY        BREAST LUMPECTOMY Left 2016     with sentinel lymph node dissection    CHOLECYSTECTOMY       HYSTERECTOMY         partial    MASTECTOMY Left 2016    MS SONO GUIDE NEEDLE BIOPSY   2016    STOMACH SURGERY         \"MASS REMOVED ,( BENIGN)\"    TOTAL KNEE ARTHROPLASTY Right      TUBAL LIGATION                   Admission Meds  No current facility-administered medications on file prior to encounter.              Current Outpatient Prescriptions on File Prior to Encounter   Medication Sig Dispense Refill    furosemide (LASIX) 20 MG tablet Take 1 tablet by mouth 2 times daily 60 tablet 3    acetaminophen-codeine (CAPITAL/CODEINE) 120-12 MG/5ML suspension Take 10 mLs by mouth every 8 hours as needed for Pain 90 mL 0    doxycycline hyclate (VIBRA-TABS) 100 MG tablet Take 1 tablet by mouth 2 times daily for 10 days 20 tablet 0    pantoprazole (PROTONIX) 40 MG tablet Take 1 tablet by mouth daily 30 tablet 3    carvedilol (COREG) 25 MG tablet Take 25 mg by mouth 2 times daily (with meals)        hydrALAZINE (APRESOLINE) 100 MG tablet Take 100 mg by mouth 3 times daily        docusate sodium (COLACE) 100 MG capsule Take 100 mg by mouth 2 times daily        simvastatin (ZOCOR) 40 MG tablet Take 40 mg by mouth nightly        clopidogrel (PLAVIX) 75 MG tablet 1 tab once daily 90 tablet 1    diclofenac sodium (VOLTAREN) 1 % GEL Apply 2 g topically 2 times daily 1 Tube 0             Allergies        Allergies   Allergen Reactions    Latex Hives    Asa [Aspirin] Nausea Only       Stomach pain         Social           Social History   Substance Use Topics    Smoking status: Current Every Day Smoker       Packs/day: 1.00    Smokeless tobacco: Never Used    Alcohol use 0.6 oz/week        1 Cans of beer per week          Comment: occasionally                 Family History           Family History   Problem Relation Age of Onset    High Blood Pressure Mother      Heart Disease Mother      Other Father         TB               Review of Systems       Denies cough / sputum. Denies chest pain, palpitations. Denies n / v / abd pain. No diarrhea. Denies dysuria or change in urinary function.       Tolerating antibiotics.             Physical Exam  BP (!) 116/52  Pulse 61  Temp 98.2 °F (36.8 °C) (Oral)   Resp 16  Ht 5' (1.524 m)  Wt 155 lb 3.3 oz (70.4 kg)  SpO2 99%  BMI 30.31 kg/m2             General Appearance: alert and oriented to person, place and time, well-developed and well-nourished, in no acute distress  Skin: warm and dry, no rash or erythema  Head: normocephalic and atraumatic  Eyes: pupils equal, round,  conjunctivae normal  ENT: hearing grossly normal bilaterally  Neck: neck supple and non tender   Pulmonary/Chest: clear to auscultation bilaterally- no wheezes, rales or rhonchi, normal air movement, no respiratory distress  Cardiovascular: normal rate, regular rhythm, normal S1 and S2, no murmurs  Abdomen: soft, non-tender, non-distended, normal bowel sounds. Extremities: no cyanosis, clubbing   Right leg erythema ,edema and warmth extend from above ankle to the upper thigh,better  . Musculoskeletal: normal range of motion, no joint swelling, deformity or tenderness  Neurologic:  muscle strength normal     Data Review:         Recent Labs       06/28/17   1240  06/29/17   0713   WBC  7.4  6.3   HGB  10.0*  9.1*   HCT  31.3*  29.1*   MCV  67.5*  68.4*   PLT  460*  400           Recent Labs       06/28/17   1240  06/29/17   0713   NA  141  140   K  4.8  4.4   CL  105  106   CO2  24  23   BUN  16  16   CREATININE  1.10*  1.07*            Imaging Studies:                                      All appropriate imaging studies and reports reviewed: Yes     Venous doppler         Summary        No evidence of superficial or deep venous thrombosis in both lower    extremities.    Enlarged lymph nodes noted in the left groins.    Technically limited visualization.         CT   Impression   1. No drainable fluid collection is identified in the right leg.  Skin   thickening and cellulitis observed, mostly in the pretibial area proximally. 2. Limited evaluation of the knee arthroplasty. CR 1.12  Assessment:        Cellulitis of right lower extremity with baseline  lymphedema. Essential hypertension    Hyperlipidemia    Gastroesophageal reflux disease without esophagitis    Tobacco abuse    Obesity (BMI 30.0-34. 9)    Acute kidney injury ([facility] [address]    Anemia    Noncompliance    CKD (chronic kidney disease) stage 3, GFR 30-59 ml/min           Recommendations:   Cont  IV Vancomycin and Cefepime for now ,monitor level and renal function closely . Leg elevation and compression . Oral ABXS on discharge and follow with Lymphedema clinic .

## 2017-07-02 NOTE — PROGRESS NOTES
Resp 16  Ht 5' (1.524 m)  Wt 158 lb 15.2 oz (72.1 kg)  SpO2 94%  BMI 31.04 kg/m2    Physical Exam   Constitutional: She is oriented to person, place, and time. No distress. HENT:   Head: Atraumatic. Neck: Neck supple. Cardiovascular: Normal rate and regular rhythm. Pulmonary/Chest: Effort normal and breath sounds normal. No respiratory distress. Abdominal: Soft. Bowel sounds are normal. There is no tenderness. Musculoskeletal: She exhibits edema (Right lower extremity) and tenderness (along right lower shin region). Bilateral toes and feet are warm and pink. Good sensory and motor in both feet. Feet appear well perfused. Right PT/DP were difficult to palpate due to significant edema. Left DP/PT 1+. Right lower extremity is edematous, especially from the knee to the toes, with dorsal fullness. (+) stemmer sign. There is no fungus between the toes or in the skin folds. Her skin is dry and thickened on the toes and at the base of the toes. Her skin is dry and flaking over the shin and ankle. No open wounds. Right shin is warmer than the left. No drainage from any areas. No areas of subcutaneous emphysema or fluctuance. The skin on the left leg is not dry or peeling. (-) stemmer sign on the left side. Skin is not thickened in the left foot. Both calves and thighs are soft and non-tender. (-) Homans. Neurological: She is alert and oriented to person, place, and time. Skin: Skin is warm and dry. Psychiatric: She has a normal mood and affect.      Lab and Imaging Review   Recent Labs      06/30/17   0715  07/01/17   0653  07/02/17   0540   WBC   --   6.3  5.7   HGB   --   9.7*  9.1*   MCV   --   68.5*  69.0*   PLT   --   406  364   NA   --   139  141   K   --   4.0  3.9   CL   --   102  103   CO2   --   26  26   BUN   --   13  12   CREATININE  1.20*  1.12*  1.12*   GLUCOSE   --   84  92   CALCIUM   --   8.6  8.5*       Assessment:     Patient Active Problem List    Diagnosis Date Noted    infections/complications. All questions answered.      Deyanira Parekh PA-C/DAVIS  [facility] [place]

## 2017-07-02 NOTE — CONSULTS
Date:   7/1/2017  Patient name: Helen Dillard  Date of admission:  6/28/2017 11:48 AM  MRN:   [id number]  YOB: 1940    Reason for Admission: Cellulitis    CHIEF COMPLAINT:  Right lower extremity edema      HISTORY OF PRESENT ILLNESS:                The patient is a 67 y.o.  female who was recently admitted for cellulitis. She was recently discharged after a hospitalization for cellulitis, but failed to take her home antibiotics, so returned with recurrent cellulitis. She states she has a history of right lower extremity lymphedema with recurrent cellulitis. She was previously seeing iwoca and wearing thigh-high compression stockings, which significantly improved her edema, but has since stopped. She states she doesn't like to wear her compression stockings since they're \"old and ratty. \" She is also not very active and keeps her legs dependent for long periods at a time. She denies any open wounds in either lower extremity. She states her right shin is sore, but otherwise denies leg pain. No chest pain, shortness of breath, or abdominal pain. She does have a history of DVT, which was previously treated with Coumadin, but she knows no further details about her VTE history. No known history of clotting disorder. Past Medical History:   has a past medical history of Abnormal kidney function; Acid reflux disease; Anesthesia; Arthritis; Bell's palsy; Cancer ([address] Cerebrovascular disease; COPD (chronic obstructive pulmonary disease) ([address] Deep vein thrombosis (DVT) ([facility] [address] Edema; History of cancer of left breast; Hx of blood clots; Hyperlipidemia; Hypertension; Pancreatitis; Wears dentures; and Wears glasses. Past Surgical History:   has a past surgical history that includes Cholecystectomy (2011); Hysterectomy; Stomach surgery; Total knee arthroplasty (Right); Tubal ligation;  Appendectomy; Breast lumpectomy (Left, 07/14/2016); sono guide needle biopsy 06/29/17   0713  06/30/17   0715  07/01/17   0653   NA  140   --   139   K  4.4   --   4.0   CO2  23   --   26   BUN  16   --   13   CREATININE  1.07*  1.20*  1.12*   LABGLOM  50*  44*  47*   GLUCOSE  93   --   84     PT/INR: No results for input(s): PROTIME, INR in the last 72 hours. APTT:No results for input(s): APTT in the last 72 hours. IMPRESSION:    Patient Active Problem List   Diagnosis    Chronic pancreatitis ([facility] [address]    Essential hypertension    Hyperlipidemia    Psoriasis    Gastroesophageal reflux disease without esophagitis    Bell's palsy    Chronic deep vein thrombosis (DVT) of both lower extremities (HCC)    Tobacco abuse    History of alcohol abuse    Malignant neoplasm of upper-outer quadrant of left female breast ([facility] [address]    Obesity (BMI 30.0-34. 9)    Anemia associated with acute blood loss    Hematoma (nontraumatic) of breast    Local infection of skin and subcutaneous tissue    Pseudomonas aeruginosa infection    Anorexia    Orthostatic dizziness    Hyperkalemia    Acute kidney injury (HCC)    Alcohol-induced chronic pancreatitis (HCC)    Delayed surgical wound healing    Cellulitis    Anemia    Noncompliance    CKD (chronic kidney disease) stage 3, GFR 30-59 ml/min       ASSESSMENT/PLAN:  1. Chronic right lower extremity lymphedema with recurrent cellulitis   - WBC 6.3, afebrile    - ID following    - On IV Vanco    - Non-compliant with antibiotics in the past.   - Right lower extremity is wrapped and both lower extremities are elevated   - No open wounds on either lower extremity, but right lower extremity is warm. Ms. Anaid Owen has findings of chronic lymphedema to the right lower extremity, including dorsal fullness, skin thickening, and (+) stemmer sign. She has been non-compliant in the past with elevation, compression and good skin care and has developed recurrent cellulitis.  She also has been non-compliant with her antibiotic regimen for cellulitis in

## 2017-07-03 LAB
ANION GAP SERPL CALCULATED.3IONS-SCNC: 10 MMOL/L (ref 9–17)
BUN BLDV-MCNC: 11 MG/DL (ref 8–23)
BUN/CREAT BLD: ABNORMAL (ref 9–20)
CALCIUM SERPL-MCNC: 8.4 MG/DL (ref 8.6–10.4)
CHLORIDE BLD-SCNC: 103 MMOL/L (ref 98–107)
CO2: 27 MMOL/L (ref 20–31)
CREAT SERPL-MCNC: 1.05 MG/DL (ref 0.5–0.9)
GFR AFRICAN AMERICAN: >60 ML/MIN
GFR NON-AFRICAN AMERICAN: 51 ML/MIN
GFR SERPL CREATININE-BSD FRML MDRD: ABNORMAL ML/MIN/{1.73_M2}
GFR SERPL CREATININE-BSD FRML MDRD: ABNORMAL ML/MIN/{1.73_M2}
GLUCOSE BLD-MCNC: 93 MG/DL (ref 70–99)
HCT VFR BLD CALC: 29.5 % (ref 36–46)
HEMOGLOBIN: 9.6 G/DL (ref 12–16)
MCH RBC QN AUTO: 22.1 PG (ref 26–34)
MCHC RBC AUTO-ENTMCNC: 32.4 G/DL (ref 31–37)
MCV RBC AUTO: 68.1 FL (ref 80–100)
PDW BLD-RTO: 16.8 % (ref 11.5–14.9)
PLATELET # BLD: 347 K/UL (ref 150–450)
PMV BLD AUTO: 7.8 FL (ref 6–12)
POTASSIUM SERPL-SCNC: 4.3 MMOL/L (ref 3.7–5.3)
RBC # BLD: 4.33 M/UL (ref 4–5.2)
SODIUM BLD-SCNC: 140 MMOL/L (ref 135–144)
WBC # BLD: 5.9 K/UL (ref 3.5–11)

## 2017-07-03 PROCEDURE — G0378 HOSPITAL OBSERVATION PER HR: HCPCS

## 2017-07-03 PROCEDURE — 1200000000 HC SEMI PRIVATE

## 2017-07-03 PROCEDURE — 2580000003 HC RX 258: Performed by: FAMILY MEDICINE

## 2017-07-03 PROCEDURE — 6370000000 HC RX 637 (ALT 250 FOR IP): Performed by: INTERNAL MEDICINE

## 2017-07-03 PROCEDURE — 6360000002 HC RX W HCPCS: Performed by: INTERNAL MEDICINE

## 2017-07-03 PROCEDURE — 2580000003 HC RX 258: Performed by: INTERNAL MEDICINE

## 2017-07-03 PROCEDURE — 6360000002 HC RX W HCPCS: Performed by: FAMILY MEDICINE

## 2017-07-03 PROCEDURE — 6370000000 HC RX 637 (ALT 250 FOR IP): Performed by: FAMILY MEDICINE

## 2017-07-03 PROCEDURE — 80048 BASIC METABOLIC PNL TOTAL CA: CPT

## 2017-07-03 PROCEDURE — 99232 SBSQ HOSP IP/OBS MODERATE 35: CPT | Performed by: FAMILY MEDICINE

## 2017-07-03 PROCEDURE — 36415 COLL VENOUS BLD VENIPUNCTURE: CPT

## 2017-07-03 PROCEDURE — 85027 COMPLETE CBC AUTOMATED: CPT

## 2017-07-03 PROCEDURE — 93971 EXTREMITY STUDY: CPT

## 2017-07-03 RX ORDER — LINEZOLID 600 MG/1
600 TABLET, FILM COATED ORAL EVERY 12 HOURS SCHEDULED
Status: DISCONTINUED | OUTPATIENT
Start: 2017-07-03 | End: 2017-07-04 | Stop reason: HOSPADM

## 2017-07-03 RX ADMIN — CARVEDILOL 25 MG: 25 TABLET, FILM COATED ORAL at 16:52

## 2017-07-03 RX ADMIN — LINEZOLID 600 MG: 600 TABLET, FILM COATED ORAL at 21:31

## 2017-07-03 RX ADMIN — SIMVASTATIN 40 MG: 40 TABLET, FILM COATED ORAL at 21:31

## 2017-07-03 RX ADMIN — CLOPIDOGREL BISULFATE 75 MG: 75 TABLET ORAL at 08:54

## 2017-07-03 RX ADMIN — FUROSEMIDE 20 MG: 20 TABLET ORAL at 08:54

## 2017-07-03 RX ADMIN — OXYCODONE HYDROCHLORIDE AND ACETAMINOPHEN 1 TABLET: 5; 325 TABLET ORAL at 02:16

## 2017-07-03 RX ADMIN — FUROSEMIDE 20 MG: 20 TABLET ORAL at 21:31

## 2017-07-03 RX ADMIN — CARVEDILOL 25 MG: 25 TABLET, FILM COATED ORAL at 08:54

## 2017-07-03 RX ADMIN — PANTOPRAZOLE SODIUM 40 MG: 40 TABLET, DELAYED RELEASE ORAL at 08:54

## 2017-07-03 RX ADMIN — ACETAMINOPHEN 650 MG: 325 TABLET ORAL at 05:56

## 2017-07-03 RX ADMIN — HYDRALAZINE HYDROCHLORIDE 100 MG: 50 TABLET, FILM COATED ORAL at 14:58

## 2017-07-03 RX ADMIN — CEFEPIME 1 G: 1 INJECTION, POWDER, FOR SOLUTION INTRAMUSCULAR; INTRAVENOUS at 02:08

## 2017-07-03 RX ADMIN — ENOXAPARIN SODIUM 40 MG: 40 INJECTION SUBCUTANEOUS at 08:55

## 2017-07-03 RX ADMIN — Medication 10 ML: at 08:55

## 2017-07-03 RX ADMIN — HYDRALAZINE HYDROCHLORIDE 100 MG: 50 TABLET, FILM COATED ORAL at 21:31

## 2017-07-03 RX ADMIN — HYDRALAZINE HYDROCHLORIDE 100 MG: 50 TABLET, FILM COATED ORAL at 08:54

## 2017-07-03 RX ADMIN — LISINOPRIL 10 MG: 10 TABLET ORAL at 08:54

## 2017-07-03 RX ADMIN — OXYCODONE HYDROCHLORIDE AND ACETAMINOPHEN 2 TABLET: 5; 325 TABLET ORAL at 14:58

## 2017-07-03 RX ADMIN — OXYCODONE HYDROCHLORIDE AND ACETAMINOPHEN 2 TABLET: 5; 325 TABLET ORAL at 08:54

## 2017-07-03 ASSESSMENT — PAIN DESCRIPTION - DESCRIPTORS
DESCRIPTORS: HEADACHE
DESCRIPTORS: DISCOMFORT;ACHING;THROBBING
DESCRIPTORS: HEADACHE
DESCRIPTORS: DISCOMFORT;ACHING;THROBBING

## 2017-07-03 ASSESSMENT — PAIN DESCRIPTION - LOCATION
LOCATION: LEG
LOCATION: HEAD
LOCATION: LEG
LOCATION: LEG
LOCATION: HEAD

## 2017-07-03 ASSESSMENT — PAIN SCALES - GENERAL
PAINLEVEL_OUTOF10: 0
PAINLEVEL_OUTOF10: 0
PAINLEVEL_OUTOF10: 7
PAINLEVEL_OUTOF10: 8
PAINLEVEL_OUTOF10: 10
PAINLEVEL_OUTOF10: 7

## 2017-07-03 ASSESSMENT — PAIN DESCRIPTION - ORIENTATION: ORIENTATION: RIGHT

## 2017-07-03 ASSESSMENT — PAIN DESCRIPTION - PAIN TYPE
TYPE: ACUTE PAIN

## 2017-07-03 ASSESSMENT — PAIN DESCRIPTION - FREQUENCY
FREQUENCY: INTERMITTENT

## 2017-07-03 ASSESSMENT — PAIN DESCRIPTION - ONSET
ONSET: AWAKENED FROM SLEEP

## 2017-07-03 NOTE — PLAN OF CARE
Problem: Pain:  Goal: Pain level will decrease  Pain level will decrease   Outcome: Ongoing  Patient has been offered pharmacological and non-pharmacological methods of pain relief during this shift. Patient appears satisfied with current regimien. Patient has slept quietly with no complaints of pain for the majority of this shift. Problem: Risk for Impaired Skin Integrity  Goal: Tissue integrity - skin and mucous membranes  Structural intactness and normal physiological function of skin and  mucous membranes. Outcome: Met This Shift  Patient tissue integrity remains intact. No new abrasions, lacerations, or bruising noted. Will continue to monitor. Problem: Falls - Risk of  Goal: Absence of falls  Outcome: Met This Shift  The patient remained free from falls this shift, call light within reach, bed in locked and lowest position. Side rails up x2. Continue to monitor closely.

## 2017-07-03 NOTE — PROGRESS NOTES
FAMILY MEDICINE  - PROGRESS NOTE    Date:  7/3/2017  Taye Dillard  [id number]      Chief Complaint   Patient presents with    Leg Swelling         Interval History:  not changed, she has no new complaints. She can be D/C'd on oral abx's when ready.       Subjective  Cardiovascular: positive for lower extremity edema  Integument/breast: positive for skin lesion(s)  Musculoskeletal:positive for arthralgias and myalgias  Behavioral/Psych: positive for obesity:    Objective:    BP (!) 127/54  Pulse 65  Temp 98.5 °F (36.9 °C) (Oral)   Resp 16  Ht 5' (1.524 m)  Wt 154 lb 8.7 oz (70.1 kg)  SpO2 100%  BMI 30.18 kg/m2  General appearance - alert, well appearing, and in no distress and overweight  Mental status - alert, oriented to person, place, and time  Eyes - pupils equal and reactive, extraocular eye movements intact  Ears - hearing grossly normal bilaterally  Nose - normal and patent, no erythema, discharge or polyps  Mouth - mucous membranes moist, pharynx normal without lesions  Neck - supple, no significant adenopathy  Lymphatics - no palpable lymphadenopathy, no hepatosplenomegaly  Chest - clear to auscultation, no wheezes, rales or rhonchi, symmetric air entry  Heart - normal rate, regular rhythm, normal S1, S2, no murmurs, rubs, clicks or gallops  Abdomen - soft, nontender, nondistended, no masses or organomegaly  Breasts - not examined  Back exam - not examined  Neurological - alert, oriented, normal speech, no focal findings or movement disorder noted  Musculoskeletal - osteoarthritic changes noted in both hands  Extremities - pedal edema 2 +  Skin - DERMATITIS NOTED: cellulitis of leg    Data:   Medications:   Current Facility-Administered Medications   Medication Dose Route Frequency Provider Last Rate Last Dose    cefepime (MAXIPIME) 1 g IVPB minibag  1 g Intravenous Q12H Alan Ruffin MD   Stopped at 07/03/17 0240    lisinopril (PRINIVIL;ZESTRIL) tablet 10 mg  10 mg Oral Daily Andrea Farrar MD   10 mg at 07/02/17 1004    vancomycin (VANCOCIN) intermittent dosing (placeholder)   Other RX Placeholder Ed Lawler MD        sodium chloride flush 0.9 % injection 10 mL  10 mL Intravenous 2 times per day Andrea Farrar MD   10 mL at 07/02/17 2142    sodium chloride flush 0.9 % injection 10 mL  10 mL Intravenous PRN Andrea Farrar MD   10 mL at 07/01/17 1944    acetaminophen (TYLENOL) tablet 650 mg  650 mg Oral Q4H PRN Andrea Farrar MD   650 mg at 07/03/17 0556    oxyCODONE-acetaminophen (PERCOCET) 5-325 MG per tablet 1 tablet  1 tablet Oral Q4H PRN Andrea Farrar MD   1 tablet at 07/03/17 0216    Or    oxyCODONE-acetaminophen (PERCOCET) 5-325 MG per tablet 2 tablet  2 tablet Oral Q4H PRN Andrea Farrar MD   2 tablet at 07/02/17 2142    acetaminophen-codeine 120-12 MG/5ML solution 10 mL  10 mL Oral Q8H PRN Andrea Farrar MD        carvedilol (COREG) tablet 25 mg  25 mg Oral BID WC Andrea Farrar MD   25 mg at 07/02/17 1620    clopidogrel (PLAVIX) tablet 75 mg  75 mg Oral Daily Andrea Farrar MD   75 mg at 07/02/17 1004    docusate sodium (COLACE) capsule 100 mg  100 mg Oral BID Andrea Farrar MD   100 mg at 07/01/17 2100    furosemide (LASIX) tablet 20 mg  20 mg Oral BID Andrea Farrar MD   20 mg at 07/02/17 2141    hydrALAZINE (APRESOLINE) tablet 100 mg  100 mg Oral TID Andrea Farrar MD   100 mg at 07/02/17 2141    pantoprazole (PROTONIX) tablet 40 mg  40 mg Oral Daily Andrea Farrar MD   40 mg at 07/02/17 1004    simvastatin (ZOCOR) tablet 40 mg  40 mg Oral Nightly Andrea Farrar MD   40 mg at 07/02/17 2142    enoxaparin (LOVENOX) injection 40 mg  40 mg Subcutaneous Daily Andrea Farrar MD   40 mg at 07/02/17 1005    ondansetron (ZOFRAN) injection 4 mg  4 mg Intravenous Q4H PRN Andrea Farrra MD        vancomycin 1000 mg IVPB in 250 mL D5W addavial  1,000 mg Intravenous Q24H Ed Lawler, MD   Stopped at 07/02/17 1730       Intake/Output Summary (Last 24 hours) at 07/03/17 0626  Last data filed at 07/03/17 0208   Gross per 24 hour   Intake               50 ml   Output                0 ml   Net               50 ml     Recent Results (from the past 24 hour(s))   Basic Metabolic Panel    Collection Time: 07/03/17  5:43 AM   Result Value Ref Range    Glucose 93 70 - 99 mg/dL    BUN 11 8 - 23 mg/dL    CREATININE 1.05 (H) 0.50 - 0.90 mg/dL    Bun/Cre Ratio NOT REPORTED 9 - 20    Calcium 8.4 (L) 8.6 - 10.4 mg/dL    Sodium 140 135 - 144 mmol/L    Potassium 4.3 3.7 - 5.3 mmol/L    Chloride 103 98 - 107 mmol/L    CO2 27 20 - 31 mmol/L    Anion Gap 10 9 - 17 mmol/L    GFR Non-African American 51 (L) >60 mL/min    GFR African American >60 >60 mL/min    GFR Comment          GFR Staging NOT REPORTED    CBC    Collection Time: 07/03/17  5:43 AM   Result Value Ref Range    WBC 5.9 3.5 - 11.0 k/uL    RBC 4.33 4.0 - 5.2 m/uL    Hemoglobin 9.6 (L) 12.0 - 16.0 g/dL    Hematocrit 29.5 (L) 36 - 46 %    MCV 68.1 (L) 80 - 100 fL    MCH 22.1 (L) 26 - 34 pg    MCHC 32.4 31 - 37 g/dL    RDW 16.8 (H) 11.5 - 14.9 %    Platelets 956 660 - 406 k/uL    MPV 7.8 6.0 - 12.0 fL     -----------------------------------------------------------------  RAD:  EKG:  Micro:     Assessment & Plan:    Patient Active Problem List:     Chronic pancreatitis ([facility] [address]     Essential hypertension     Hyperlipidemia     Psoriasis     Gastroesophageal reflux disease without esophagitis     Bell's palsy     Chronic deep vein thrombosis (DVT) of both lower extremities (HCC)     Tobacco abuse     History of alcohol abuse     Malignant neoplasm of upper-outer quadrant of left female breast (HCC)     Obesity (BMI 30.0-34. 9)     Anemia associated with acute blood loss     Hematoma (nontraumatic) of breast     Local infection of skin and subcutaneous tissue     Pseudomonas aeruginosa infection     Anorexia     Orthostatic dizziness     Hyperkalemia     Acute

## 2017-07-03 NOTE — PLAN OF CARE
Problem: Pain:  Goal: Pain level will decrease  Pain level will decrease   Outcome: Ongoing  Percocet for pain control. Problem: Risk for Impaired Skin Integrity  Goal: Tissue integrity - skin and mucous membranes  Structural intactness and normal physiological function of skin and  mucous membranes. Outcome: Ongoing  Skin integrity maintained. No new skin break down. Problem: Falls - Risk of  Goal: Absence of falls  Outcome: Ongoing  No falls noted this shift. Patient ambulates with x1 staff assistance without difficulty. Bed kept in low position. Safe environment maintained. Bedside table & call light in reach. Uses call light appropriately when needing assistance. Problem: Mobility - Impaired:  Goal: Mobility will improve to maximum level  Mobility will improve to maximum level   Outcome: Ongoing  RN had LT leg elevated this entire shift using pillows and the bottom of the patient's bed was also elevated this entire shift.

## 2017-07-03 NOTE — CARE COORDINATION
ONGOING DISCHARGE PLAN:    Spoke with patient regarding discharge plan and patient confirms that plan is still to go home w/ Daughter & continue w/ [facility], [address] Continue to follow for possible IV antibiotics, Per Dr. Mariza Ortiz notes most likely will need orals. ? Lymphedema clinic. Per Vascular Notes:  Stable. Rt. Leg swelling persists,hair scan is normal.  Will order lymphopress. Will continue to follow for additional discharge needs.     Electronically signed by Maryjo Baer RN on 7/3/2017 at 9:04 AM

## 2017-07-04 VITALS
DIASTOLIC BLOOD PRESSURE: 51 MMHG | WEIGHT: 154.54 LBS | RESPIRATION RATE: 18 BRPM | HEART RATE: 69 BPM | SYSTOLIC BLOOD PRESSURE: 124 MMHG | TEMPERATURE: 98.8 F | OXYGEN SATURATION: 99 % | BODY MASS INDEX: 30.34 KG/M2 | HEIGHT: 60 IN

## 2017-07-04 PROCEDURE — 6370000000 HC RX 637 (ALT 250 FOR IP): Performed by: FAMILY MEDICINE

## 2017-07-04 PROCEDURE — G0378 HOSPITAL OBSERVATION PER HR: HCPCS

## 2017-07-04 PROCEDURE — 6370000000 HC RX 637 (ALT 250 FOR IP): Performed by: INTERNAL MEDICINE

## 2017-07-04 PROCEDURE — 99233 SBSQ HOSP IP/OBS HIGH 50: CPT | Performed by: INTERNAL MEDICINE

## 2017-07-04 PROCEDURE — 6360000002 HC RX W HCPCS: Performed by: FAMILY MEDICINE

## 2017-07-04 PROCEDURE — 99239 HOSP IP/OBS DSCHRG MGMT >30: CPT | Performed by: FAMILY MEDICINE

## 2017-07-04 RX ORDER — LINEZOLID 600 MG/1
600 TABLET, FILM COATED ORAL EVERY 12 HOURS SCHEDULED
Qty: 20 TABLET | Refills: 0 | Status: SHIPPED | OUTPATIENT
Start: 2017-07-04 | End: 2017-07-14

## 2017-07-04 RX ADMIN — LINEZOLID 600 MG: 600 TABLET, FILM COATED ORAL at 08:23

## 2017-07-04 RX ADMIN — PANTOPRAZOLE SODIUM 40 MG: 40 TABLET, DELAYED RELEASE ORAL at 08:23

## 2017-07-04 RX ADMIN — CARVEDILOL 25 MG: 25 TABLET, FILM COATED ORAL at 17:36

## 2017-07-04 RX ADMIN — ENOXAPARIN SODIUM 40 MG: 40 INJECTION SUBCUTANEOUS at 08:23

## 2017-07-04 RX ADMIN — FUROSEMIDE 20 MG: 20 TABLET ORAL at 08:23

## 2017-07-04 RX ADMIN — LISINOPRIL 10 MG: 10 TABLET ORAL at 08:23

## 2017-07-04 RX ADMIN — FUROSEMIDE 20 MG: 20 TABLET ORAL at 20:08

## 2017-07-04 RX ADMIN — OXYCODONE HYDROCHLORIDE AND ACETAMINOPHEN 2 TABLET: 5; 325 TABLET ORAL at 11:48

## 2017-07-04 RX ADMIN — CLOPIDOGREL BISULFATE 75 MG: 75 TABLET ORAL at 08:23

## 2017-07-04 RX ADMIN — HYDRALAZINE HYDROCHLORIDE 100 MG: 50 TABLET, FILM COATED ORAL at 08:23

## 2017-07-04 RX ADMIN — CARVEDILOL 25 MG: 25 TABLET, FILM COATED ORAL at 08:23

## 2017-07-04 ASSESSMENT — PAIN SCALES - GENERAL
PAINLEVEL_OUTOF10: 7
PAINLEVEL_OUTOF10: 5
PAINLEVEL_OUTOF10: 0

## 2017-07-04 NOTE — PROGRESS NOTES
Spoke with Pt regarding ability to pay for medications if called into pharmacy. Pt stated that her son will have the money to pay the co-pay.

## 2017-07-04 NOTE — PROGRESS NOTES
Infectious disease Consult Note        Patient: Christiano Dillard                                            : 1940                                        Acct#:  [id number]      Date:  2017    Subjective:      History of Present Illness  Patient is a 67 y.o.  female admitted with Cellulitis who is seen in consult for the same. She presented with increase right leg pain and swelling  . She is feeling better today less right leg pain and swelling   .   She denied fever or chills . Past Medical History         Past Medical History:   Diagnosis Date    Abnormal kidney function       PT. RELATES UNAWARE OF.  Acid reflux disease      Anesthesia       'needs to be asleep when ET tube removed due  to severe gastric reflux will make me have hard time breathing. \"     Arthritis      Bell's palsy      Cancer (HCC)       Breast, lt.  Cerebrovascular disease       pt denies stroke she says she had bells palsy    COPD (chronic obstructive pulmonary disease) ([facility] [address]       patient is unaware    Deep vein thrombosis (DVT) (HCC)       multiple    Edema       RT. LEG.  History of cancer of left breast      Hx of blood clots       dvt scott. legs.     Hyperlipidemia      Hypertension      Pancreatitis      Wears dentures       UPPER    Wears glasses            Past Surgical History          Past Surgical History:   Procedure Laterality Date    APPENDECTOMY        BREAST LUMPECTOMY Left 2016     with sentinel lymph node dissection    CHOLECYSTECTOMY       HYSTERECTOMY         partial    MASTECTOMY Left 2016    WA SONO GUIDE NEEDLE BIOPSY   2016    STOMACH SURGERY         \"MASS REMOVED ,( BENIGN)\"    TOTAL KNEE ARTHROPLASTY Right      TUBAL LIGATION                   Admission Meds  No current facility-administered medications on file prior to encounter.              Current Outpatient Prescriptions on File Prior to Encounter   Medication Sig Dispense Refill    furosemide (LASIX) 20 MG tablet Take 1 tablet by mouth 2 times daily 60 tablet 3    acetaminophen-codeine (CAPITAL/CODEINE) 120-12 MG/5ML suspension Take 10 mLs by mouth every 8 hours as needed for Pain 90 mL 0    doxycycline hyclate (VIBRA-TABS) 100 MG tablet Take 1 tablet by mouth 2 times daily for 10 days 20 tablet 0    pantoprazole (PROTONIX) 40 MG tablet Take 1 tablet by mouth daily 30 tablet 3    carvedilol (COREG) 25 MG tablet Take 25 mg by mouth 2 times daily (with meals)        hydrALAZINE (APRESOLINE) 100 MG tablet Take 100 mg by mouth 3 times daily        docusate sodium (COLACE) 100 MG capsule Take 100 mg by mouth 2 times daily        simvastatin (ZOCOR) 40 MG tablet Take 40 mg by mouth nightly        clopidogrel (PLAVIX) 75 MG tablet 1 tab once daily 90 tablet 1    diclofenac sodium (VOLTAREN) 1 % GEL Apply 2 g topically 2 times daily 1 Tube 0             Allergies        Allergies   Allergen Reactions    Latex Hives    Asa [Aspirin] Nausea Only       Stomach pain         Social           Social History   Substance Use Topics    Smoking status: Current Every Day Smoker       Packs/day: 1.00    Smokeless tobacco: Never Used    Alcohol use 0.6 oz/week        1 Cans of beer per week          Comment: occasionally                 Family History           Family History   Problem Relation Age of Onset    High Blood Pressure Mother      Heart Disease Mother      Other Father         TB               Review of Systems       Denies cough / sputum. Denies chest pain, palpitations. Denies n / v / abd pain. No diarrhea. Denies dysuria or change in urinary function.       Tolerating antibiotics.             Physical Exam  BP (!) 116/52  Pulse 61  Temp 98.2 °F (36.8 °C) (Oral)   Resp 16  Ht 5' (1.524 m)  Wt 155 lb 3.3 oz (70.4 kg)  SpO2 99%  BMI 30.31 kg/m2             General Appearance: alert and oriented to person, place and time, well-developed and well-nourished, in no

## 2017-07-04 NOTE — PLAN OF CARE
Problem: Pain:  Goal: Pain level will decrease  Pain level will decrease   Outcome: Ongoing  Pt stated pain 0/10 throughout shift. Did not want any medication for pain  Goal: Control of acute pain  Control of acute pain   Outcome: Ongoing  Pt stated pain 0/10 throughout shift. Did not want any medication for pain  Goal: Control of chronic pain  Control of chronic pain   Outcome: Ongoing  Pt stated pain 0/10 throughout shift. Did not want any medication for pain    Problem: Risk for Impaired Skin Integrity  Goal: Tissue integrity - skin and mucous membranes  Structural intactness and normal physiological function of skin and  mucous membranes. Outcome: Met This Shift  No evidence of new skin breakdown. Pt turned and repositioned per self. Heels elevated off bed. Problem: Falls - Risk of  Goal: Absence of falls  Outcome: Met This Shift  Pt free of falls this shift. Side rails up x2. Bed in lowest position. Call light within reach. Non-skid footwear applied.      Problem: Mobility - Impaired:  Goal: Mobility will improve to maximum level  Mobility will improve to maximum level   Outcome: Ongoing  Pt able to achieve mobility with use of cane     Comments:   Electronically signed by Baljeet Linder RN on 7/4/2017 at 3:37 AM

## 2017-07-04 NOTE — PROGRESS NOTES
FAMILY MEDICINE  - PROGRESS NOTE    Date:  7/4/2017  Judson Dillard  [id number]      Chief Complaint   Patient presents with    Leg Swelling         Interval History:  not changed, pt lost her IV access. She was put on oral Zyvox. She has no new complaints.       Subjective  Constitutional: negative  Eyes: positive for contacts/glasses  Ears, nose, mouth, throat, and face: negative  Respiratory: negative  Cardiovascular: positive for lower extremity edema  Gastrointestinal: positive for reflux symptoms  Musculoskeletal:positive for arthralgias and myalgias  Neurological: negative  Behavioral/Psych: positive for obesity  Endocrine: negative:    Objective:    BP (!) 145/70  Pulse 73  Temp 99.2 °F (37.3 °C) (Oral)   Resp 18  Ht 5' (1.524 m)  Wt 154 lb 8.7 oz (70.1 kg)  SpO2 97%  BMI 30.18 kg/m2  General appearance - alert, well appearing, and in no distress and overweight  Mental status - alert, oriented to person, place, and time  Eyes - pupils equal and reactive, extraocular eye movements intact  Ears - hearing grossly normal bilaterally  Nose - normal and patent, no erythema, discharge or polyps  Mouth - mucous membranes moist, pharynx normal without lesions  Neck - supple, no significant adenopathy  Lymphatics - no palpable lymphadenopathy, no hepatosplenomegaly  Chest - clear to auscultation, no wheezes, rales or rhonchi, symmetric air entry  Heart - normal rate, regular rhythm, normal S1, S2, no murmurs, rubs, clicks or gallops  Abdomen - soft, nontender, nondistended, no masses or organomegaly  Breasts - not examined  Back exam - full range of motion, no tenderness, palpable spasm or pain on motion  Neurological - alert, oriented, normal speech, no focal findings or movement disorder noted  Musculoskeletal - osteoarthritic changes noted in both hands  Extremities - peripheral pulses normal, no pedal edema, no clubbing or cyanosis, pedal edema 2

## 2017-07-04 NOTE — CARE COORDINATION
ONGOING DISCHARGE PLAN:    Spoke with patient regarding discharge plan and patient confirms that plan is to go home with [facility]    Note from Floor nurse regarding zyvox medication - Per Dr. Anderson Swenson, patient will need Zyvox 600 mg po bid x 10 days. escribed to patient's pharmacy. Received a call from [address] [facility]- patient's copay would be $176.00. Spoke to patient and she stated that she will have to check with her kids and see if they can help with that. She will let her nurse know after she talks with them. [address] [facility] [place] will have to be contacted to inform them to fill the prescription or disregard. Note from PCP - Plan:  Continue current treatments. Okay to D/C home with home care. Oral abx per ID. Lymphedema clinic. Complete orders per chart. Follow up in the office next week or prn. Will continue to follow for additional discharge needs.     Electronically signed by Oralia Anne RN on 7/4/2017 at 12:39 PM

## 2017-07-05 LAB
CULTURE: NORMAL
Lab: NORMAL
SPECIMEN DESCRIPTION: NORMAL
STATUS: NORMAL
STATUS: NORMAL

## 2017-07-05 NOTE — DISCHARGE SUMMARY
[facility] Discharge Summary      Patient ID: Davis Dillard    MRN: [id number]     Acct:  [de-identified]       Patient's PCP: Yuniel Garner PA-C    Admit Date: 6/28/2017     Discharge Date: 7/4/2017      Admitting Physician: Marco Ramirez MD    Discharge Physician: Venecia Palomares MD     Discharge Diagnoses:    Primary Problem  Cellulitis    Active Hospital Problems    Diagnosis Date Noted    Iron deficiency anemia [D50.9]     Noncompliance [Z91.19] 06/29/2017    CKD (chronic kidney disease) stage 3, GFR 30-59 ml/min [N18.3] 06/29/2017    Cellulitis [L03.90] 06/28/2017    Anemia [D64.9] 06/28/2017    Acute kidney injury ([address] [N17.9] 10/14/2016    Obesity (BMI 30.0-34. 9) [E66.9] 09/13/2016    Tobacco abuse [Z72.0] 06/10/2016    Essential hypertension [I10] 03/30/2016    Hyperlipidemia [E78.5] 03/30/2016    Gastroesophageal reflux disease without esophagitis [K21.9] 03/30/2016     Past Medical History:   Diagnosis Date    Abnormal kidney function     PT. RELATES UNAWARE OF.  Acid reflux disease     Anesthesia     'needs to be asleep when ET tube removed due  to severe gastric reflux will make me have hard time breathing. \"     Arthritis     Bell's palsy     Cancer ([address]     Breast, lt.  Cerebrovascular disease     pt denies stroke she says she had bells palsy    COPD (chronic obstructive pulmonary disease) ([address]     patient is unaware    Deep vein thrombosis (DVT) ([address]     multiple    Edema     RT. LEG.  History of cancer of left breast     Hx of blood clots     dvt scott. legs.  Hyperlipidemia     Hypertension     Pancreatitis     Wears dentures     UPPER    Wears glasses      The patient was seen and examined on day of discharge and this discharge summary is in conjunction with any daily progress note from day of discharge.     Code Status:  Full Code    Hospital Course: uncomplicated    Consults:  ID and vascular surgery    Significant Diagnostic Studies: as

## 2017-07-05 NOTE — PROGRESS NOTES
Writer thoroughly went over discharge instructions, including medications. Prescriptions escribed. All questions answered to the satisfaction of the pt. No distress noted. Bedside RN will continue to monitor until pt is ready to leave. Prescription given to pt for lymphedema clinic. Reviewed new antibiotic prescription for patient at discharge. (Zyvox) Information added to discharge instructions    - reviewed possible and common side effects. Especially monitoring for diarrhea due to    antibiotic use. -reviewed directions for when to take antibiotic, and dietary restrictions. - emphasized importance of completing antibiotic therapy. - reviewed when to call physician.

## 2017-07-11 ENCOUNTER — HOSPITAL ENCOUNTER (EMERGENCY)
Age: 77
Discharge: HOME | End: 2017-07-11
Attending: EMERGENCY MEDICINE
Payer: MEDICARE

## 2017-07-11 VITALS
BODY MASS INDEX: 31.02 KG/M2 | RESPIRATION RATE: 18 BRPM | WEIGHT: 158 LBS | DIASTOLIC BLOOD PRESSURE: 78 MMHG | HEIGHT: 60 IN | TEMPERATURE: 98.4 F | HEART RATE: 72 BPM | SYSTOLIC BLOOD PRESSURE: 189 MMHG | OXYGEN SATURATION: 98 %

## 2017-07-11 DIAGNOSIS — N18.9 CHRONIC KIDNEY DISEASE (CKD), UNSPECIFIED STAGE: ICD-10-CM

## 2017-07-11 DIAGNOSIS — M79.89 LEG SWELLING: Primary | ICD-10-CM

## 2017-07-11 LAB
ABSOLUTE EOS #: 0.2 K/UL (ref 0–0.4)
ABSOLUTE LYMPH #: 1.2 K/UL (ref 1–4.8)
ABSOLUTE MONO #: 0.4 K/UL (ref 0.1–1.3)
ALBUMIN SERPL-MCNC: 3.2 G/DL (ref 3.5–5.2)
ALBUMIN/GLOBULIN RATIO: ABNORMAL (ref 1–2.5)
ALP BLD-CCNC: 57 U/L (ref 35–104)
ALT SERPL-CCNC: 10 U/L (ref 5–33)
ANION GAP SERPL CALCULATED.3IONS-SCNC: 13 MMOL/L (ref 9–17)
AST SERPL-CCNC: 15 U/L
BASOPHILS # BLD: 1 %
BASOPHILS ABSOLUTE: 0 K/UL (ref 0–0.2)
BILIRUB SERPL-MCNC: 0.25 MG/DL (ref 0.3–1.2)
BUN BLDV-MCNC: 19 MG/DL (ref 8–23)
BUN/CREAT BLD: ABNORMAL (ref 9–20)
CALCIUM SERPL-MCNC: 8.9 MG/DL (ref 8.6–10.4)
CHLORIDE BLD-SCNC: 104 MMOL/L (ref 98–107)
CO2: 22 MMOL/L (ref 20–31)
CREAT SERPL-MCNC: 1.32 MG/DL (ref 0.5–0.9)
DIFFERENTIAL TYPE: ABNORMAL
EOSINOPHILS RELATIVE PERCENT: 6 %
GFR AFRICAN AMERICAN: 47 ML/MIN
GFR NON-AFRICAN AMERICAN: 39 ML/MIN
GFR SERPL CREATININE-BSD FRML MDRD: ABNORMAL ML/MIN/{1.73_M2}
GFR SERPL CREATININE-BSD FRML MDRD: ABNORMAL ML/MIN/{1.73_M2}
GLUCOSE BLD-MCNC: 87 MG/DL (ref 70–99)
HCT VFR BLD CALC: 33.7 % (ref 36–46)
HEMOGLOBIN: 10.5 G/DL (ref 12–16)
LYMPHOCYTES # BLD: 30 %
MCH RBC QN AUTO: 22 PG (ref 26–34)
MCHC RBC AUTO-ENTMCNC: 31.3 G/DL (ref 31–37)
MCV RBC AUTO: 70.4 FL (ref 80–100)
MONOCYTES # BLD: 10 %
PDW BLD-RTO: 17.7 % (ref 11.5–14.9)
PLATELET # BLD: 240 K/UL (ref 150–450)
PLATELET ESTIMATE: ABNORMAL
PMV BLD AUTO: 7.9 FL (ref 6–12)
POTASSIUM SERPL-SCNC: 4.7 MMOL/L (ref 3.7–5.3)
RBC # BLD: 4.79 M/UL (ref 4–5.2)
RBC # BLD: ABNORMAL 10*6/UL
SEG NEUTROPHILS: 53 %
SEGMENTED NEUTROPHILS ABSOLUTE COUNT: 2.2 K/UL (ref 1.3–9.1)
SODIUM BLD-SCNC: 139 MMOL/L (ref 135–144)
TOTAL PROTEIN: 7.2 G/DL (ref 6.4–8.3)
WBC # BLD: 4.1 K/UL (ref 3.5–11)
WBC # BLD: ABNORMAL 10*3/UL

## 2017-07-11 PROCEDURE — 36415 COLL VENOUS BLD VENIPUNCTURE: CPT

## 2017-07-11 PROCEDURE — 80053 COMPREHEN METABOLIC PANEL: CPT

## 2017-07-11 PROCEDURE — 85025 COMPLETE CBC W/AUTO DIFF WBC: CPT

## 2017-07-11 PROCEDURE — 99283 EMERGENCY DEPT VISIT LOW MDM: CPT

## 2017-07-11 ASSESSMENT — ENCOUNTER SYMPTOMS
COUGH: 0
NAUSEA: 0
BACK PAIN: 0
SHORTNESS OF BREATH: 0
ABDOMINAL PAIN: 0
RHINORRHEA: 0
EYE PAIN: 0
VOMITING: 0
EYE REDNESS: 0

## 2017-07-11 ASSESSMENT — PAIN DESCRIPTION - LOCATION: LOCATION: LEG

## 2017-07-11 ASSESSMENT — PAIN DESCRIPTION - ONSET: ONSET: ON-GOING

## 2017-07-11 ASSESSMENT — PAIN SCALES - GENERAL: PAINLEVEL_OUTOF10: 9

## 2017-07-11 ASSESSMENT — PAIN DESCRIPTION - ORIENTATION: ORIENTATION: RIGHT

## 2017-07-11 ASSESSMENT — PAIN DESCRIPTION - DESCRIPTORS: DESCRIPTORS: THROBBING

## 2017-07-11 ASSESSMENT — PAIN DESCRIPTION - PAIN TYPE: TYPE: ACUTE PAIN

## 2017-07-12 ENCOUNTER — CARE COORDINATION (OUTPATIENT)
Dept: CARE COORDINATION | Age: 77
End: 2017-07-12

## 2017-07-13 ENCOUNTER — CARE COORDINATION (OUTPATIENT)
Dept: CARE COORDINATION | Age: 77
End: 2017-07-13

## 2017-07-19 ENCOUNTER — CARE COORDINATION (OUTPATIENT)
Dept: CARE COORDINATION | Age: 77
End: 2017-07-19

## 2017-07-23 ENCOUNTER — HOSPITAL ENCOUNTER (OUTPATIENT)
Age: 77
Setting detail: SPECIMEN
Discharge: HOME | End: 2017-07-23
Payer: MEDICARE

## 2017-07-23 LAB
ABSOLUTE EOS #: 0.2 K/UL (ref 0–0.4)
ABSOLUTE LYMPH #: 1.5 K/UL (ref 1–4.8)
ABSOLUTE MONO #: 0.4 K/UL (ref 0.2–0.8)
ANION GAP SERPL CALCULATED.3IONS-SCNC: 12 MMOL/L (ref 9–17)
BASOPHILS # BLD: 1 %
BASOPHILS ABSOLUTE: 0 K/UL (ref 0–0.2)
BUN BLDV-MCNC: 21 MG/DL (ref 8–23)
BUN/CREAT BLD: 19 (ref 9–20)
C-REACTIVE PROTEIN: 0.9 MG/L (ref 0–5)
CALCIUM SERPL-MCNC: 8.7 MG/DL (ref 8.6–10.4)
CHLORIDE BLD-SCNC: 108 MMOL/L (ref 98–107)
CO2: 22 MMOL/L (ref 20–31)
CREAT SERPL-MCNC: 1.12 MG/DL (ref 0.5–0.9)
DIFFERENTIAL TYPE: ABNORMAL
EOSINOPHILS RELATIVE PERCENT: 5 %
GFR AFRICAN AMERICAN: 57 ML/MIN
GFR NON-AFRICAN AMERICAN: 47 ML/MIN
GFR SERPL CREATININE-BSD FRML MDRD: ABNORMAL ML/MIN/{1.73_M2}
GFR SERPL CREATININE-BSD FRML MDRD: ABNORMAL ML/MIN/{1.73_M2}
GLUCOSE BLD-MCNC: 72 MG/DL (ref 70–99)
HCT VFR BLD CALC: 29.9 % (ref 36–46)
HEMOGLOBIN: 9.5 G/DL (ref 12–16)
LYMPHOCYTES # BLD: 31 %
MCH RBC QN AUTO: 22.3 PG (ref 26–34)
MCHC RBC AUTO-ENTMCNC: 31.8 G/DL (ref 31–37)
MCV RBC AUTO: 70.1 FL (ref 80–100)
MONOCYTES # BLD: 9 %
PDW BLD-RTO: 19 % (ref 11.5–14.5)
PLATELET # BLD: 181 K/UL (ref 130–400)
PLATELET ESTIMATE: ABNORMAL
PMV BLD AUTO: 8.9 FL (ref 6–12)
POTASSIUM SERPL-SCNC: 4 MMOL/L (ref 3.7–5.3)
RBC # BLD: 4.28 M/UL (ref 4–5.2)
RBC # BLD: ABNORMAL 10*6/UL
SEG NEUTROPHILS: 54 %
SEGMENTED NEUTROPHILS ABSOLUTE COUNT: 2.6 K/UL (ref 1.8–7.7)
SODIUM BLD-SCNC: 142 MMOL/L (ref 135–144)
WBC # BLD: 4.8 K/UL (ref 3.5–11)
WBC # BLD: ABNORMAL 10*3/UL

## 2017-07-23 PROCEDURE — 86140 C-REACTIVE PROTEIN: CPT

## 2017-07-23 PROCEDURE — 36415 COLL VENOUS BLD VENIPUNCTURE: CPT

## 2017-07-23 PROCEDURE — 80048 BASIC METABOLIC PNL TOTAL CA: CPT

## 2017-07-23 PROCEDURE — 85025 COMPLETE CBC W/AUTO DIFF WBC: CPT

## 2017-08-07 ENCOUNTER — HOSPITAL ENCOUNTER (OUTPATIENT)
Age: 77
Setting detail: SPECIMEN
Discharge: HOME | End: 2017-08-07
Payer: MEDICARE

## 2017-08-07 LAB
ANION GAP SERPL CALCULATED.3IONS-SCNC: 13 MMOL/L (ref 9–17)
BUN BLDV-MCNC: 18 MG/DL (ref 8–23)
BUN/CREAT BLD: ABNORMAL (ref 9–20)
CALCIUM SERPL-MCNC: 8.3 MG/DL (ref 8.6–10.4)
CHLORIDE BLD-SCNC: 102 MMOL/L (ref 98–107)
CO2: 26 MMOL/L (ref 20–31)
CREAT SERPL-MCNC: 1.25 MG/DL (ref 0.5–0.9)
GFR AFRICAN AMERICAN: 50 ML/MIN
GFR NON-AFRICAN AMERICAN: 42 ML/MIN
GFR SERPL CREATININE-BSD FRML MDRD: ABNORMAL ML/MIN/{1.73_M2}
GFR SERPL CREATININE-BSD FRML MDRD: ABNORMAL ML/MIN/{1.73_M2}
GLUCOSE BLD-MCNC: 69 MG/DL (ref 70–99)
POTASSIUM SERPL-SCNC: 3.3 MMOL/L (ref 3.7–5.3)
SODIUM BLD-SCNC: 141 MMOL/L (ref 135–144)

## 2017-08-07 PROCEDURE — P9603 ONE-WAY ALLOW PRORATED MILES: HCPCS

## 2017-08-07 PROCEDURE — 80048 BASIC METABOLIC PNL TOTAL CA: CPT

## 2017-08-07 PROCEDURE — 36415 COLL VENOUS BLD VENIPUNCTURE: CPT

## 2017-11-14 ENCOUNTER — OFFICE VISIT (OUTPATIENT)
Dept: ONCOLOGY | Age: 77
End: 2017-11-14
Payer: MEDICARE

## 2017-11-14 VITALS
SYSTOLIC BLOOD PRESSURE: 158 MMHG | RESPIRATION RATE: 20 BRPM | HEART RATE: 80 BPM | DIASTOLIC BLOOD PRESSURE: 88 MMHG | TEMPERATURE: 98.5 F

## 2017-11-14 DIAGNOSIS — Z12.31 ENCOUNTER FOR SCREENING MAMMOGRAM FOR MALIGNANT NEOPLASM OF BREAST: ICD-10-CM

## 2017-11-14 DIAGNOSIS — Z17.0 MALIGNANT NEOPLASM OF UPPER-OUTER QUADRANT OF LEFT BREAST IN FEMALE, ESTROGEN RECEPTOR POSITIVE (HCC): Primary | Chronic | ICD-10-CM

## 2017-11-14 DIAGNOSIS — C50.412 MALIGNANT NEOPLASM OF UPPER-OUTER QUADRANT OF LEFT BREAST IN FEMALE, ESTROGEN RECEPTOR POSITIVE (HCC): Primary | Chronic | ICD-10-CM

## 2017-11-14 PROCEDURE — 4040F PNEUMOC VAC/ADMIN/RCVD: CPT | Performed by: INTERNAL MEDICINE

## 2017-11-14 PROCEDURE — G8417 CALC BMI ABV UP PARAM F/U: HCPCS | Performed by: INTERNAL MEDICINE

## 2017-11-14 PROCEDURE — 99211 OFF/OP EST MAY X REQ PHY/QHP: CPT

## 2017-11-14 PROCEDURE — 4004F PT TOBACCO SCREEN RCVD TLK: CPT | Performed by: INTERNAL MEDICINE

## 2017-11-14 PROCEDURE — G8427 DOCREV CUR MEDS BY ELIG CLIN: HCPCS | Performed by: INTERNAL MEDICINE

## 2017-11-14 PROCEDURE — 1090F PRES/ABSN URINE INCON ASSESS: CPT | Performed by: INTERNAL MEDICINE

## 2017-11-14 PROCEDURE — 1123F ACP DISCUSS/DSCN MKR DOCD: CPT | Performed by: INTERNAL MEDICINE

## 2017-11-14 PROCEDURE — 99214 OFFICE O/P EST MOD 30 MIN: CPT | Performed by: INTERNAL MEDICINE

## 2017-11-14 PROCEDURE — G8400 PT W/DXA NO RESULTS DOC: HCPCS | Performed by: INTERNAL MEDICINE

## 2017-11-14 PROCEDURE — G8484 FLU IMMUNIZE NO ADMIN: HCPCS | Performed by: INTERNAL MEDICINE

## 2017-11-14 NOTE — PROGRESS NOTES
DIAGNOSIS:   1. Early breast cancer on the left side, clinical stage is T1a N0 M0   2. Tumor is ER/NH positive and HER-2/merlene negative   3. 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 History of multiple comorbidities including history of recurrent VTE, history of hypertension, history of lymphedema in lower extremity, history of chronic pancreatitis and COPD     CURRENT THERAPY:  1. Status post mastectomy and axillary sampling  2. No adjuvant therapy recommended   BRIEF CASE HISTORY:    Mohinder Perez is a very pleasant 67 y.o. -American female with multiple comorbidities, include history of recurrent deep venous thromboses, COPD, history of chronic pancreatitis (BILIARY) , and chronic smoking. She underwent screening mammogram on 4/11/16 that showed 2 groups of calcification in the left breast.  The adjacent to each other and in the same quadrant. That was suspicious for malignancy. . Diagnostic study was done on 5/3/16 and that showed the same 2 groups of calcification in the same quadrant as mentioned. Ultrasound did not show any masses but showed a septated cyst in the right breast.  A biopsy was recommended on the left breast.  Biopsy was done on 5/26/16 and that showed invasive mammary carcinoma, grade 2, less than 1 mm in greatest dimension, the tumor is ER/NH positive and HER-2/merlene negative. There was intermediate grade ductal carcinoma in situ him a predominantly solid, ER positive and NH positive. She has a history of repeated DVT, she was on at [facility] very unfortunately she switch doctors so some of the records is not available to me. But the daughters state that she was not taking the medication so that her doctor stopped it. She has chronic swelling of her right lower extremity, she attributes this to right knee surgery. She was seen by lymphedema clinic but she is not wearing any stockings.   Although in the chart it states that she had history of stroke and cirrhosis, she denies both per day. She has never used smokeless tobacco. She reports that she drinks alcohol. She reports that she does not use drugs. REVIEW OF SYSTEMS:     General: no fever or night sweats, Weight is stable. She has chronic swelling and lower extremity especially the right . unchanged  ENT: No double or blurred vision, no tinnitus or hearing problem, no dysphagia or sore throat   Respiratory: No chest pain, no shortness of breath, no cough or hemoptysis. Cardiovascular: Denies chest pain, PND or orthopnea. No L E swelling or palpitations. Gastrointestinal:    She has chronic abdominal pain. She has history of chronic pancreatitis. no nausea ,no vomiting, no diarrhea   Genitourinary: Denies dysuria, hematuria, frequency, urgency or incontinence. Neurological: Denies headaches, decreased LOC, examination is difficult but no focal deficit is appreciated   Musculoskeletal:  No arthralgia no back pain or joint swelling. Skin: There are no rashes or bleeding. Psychiatric:  No anxiety, no depression. Endocrine: no diabetes or thyroid disease. Hematologic: no bleeding , no adenopathy. PHYSICAL EXAM:  The patient is not in acute distress. Wheelchair bound Vital signs: Blood pressure (!) 158/88, pulse 80, temperature 98.5 °F (36.9 °C), temperature source Oral, resp. rate 20. HEENT:  Eyes are normal. Ears, nose and throat are normal.  Neck: Supple. No lymph node enlargement. No thyroid enlargement. Trachea is centrally located. Chest:  Clear to auscultation. No wheezes or crepitations. Breast:  Post left mastectomy with healed incision, skin is tight no infection , the right breast is free of any masses or adenopathy  Heart: Regular sinus rhythm. Abdomen: Soft, nontender. No hepatosplenomegaly. No masses. Extremities:  Significant edema and lymphedema especially of the right lower extremity. The left is somewhat swollen as well  Lymph Nodes:  No cervical, axillary or inguinal lymph node enlargement.

## 2017-11-14 NOTE — LETTER
Lizzie Juarez MD    11/14/2017     Farris Denver, PA-C   [address] [facility] [address] [facility]    Dear Doctors : Thank you for referring [facility], 1940, to me for evaluation. Below are the relevant portions of my assessment and plan of care. DIAGNOSIS:   1. Early breast cancer on the left side, clinical stage is T1a N0 M0   2. Tumor is ER/DC positive and HER-2/merlene negative   3. 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 History of multiple comorbidities including history of recurrent VTE, history of hypertension, history of lymphedema in lower extremity, history of chronic pancreatitis and COPD     CURRENT THERAPY:  1. Status post mastectomy and axillary sampling  2. No adjuvant therapy recommended   BRIEF CASE HISTORY:    Lilian Marino is a very pleasant 67 y.o. -American female with multiple comorbidities, include history of recurrent deep venous thromboses, COPD, history of chronic pancreatitis (BILIARY) , and chronic smoking. She underwent screening mammogram on 4/11/16 that showed 2 groups of calcification in the left breast.  The adjacent to each other and in the same quadrant. That was suspicious for malignancy. . Diagnostic study was done on 5/3/16 and that showed the same 2 groups of calcification in the same quadrant as mentioned. Ultrasound did not show any masses but showed a septated cyst in the right breast.  A biopsy was recommended on the left breast.  Biopsy was done on 5/26/16 and that showed invasive mammary carcinoma, grade 2, less than 1 mm in greatest dimension, the tumor is ER/DC positive and HER-2/merlene negative. There was intermediate grade ductal carcinoma in situ him a predominantly solid, ER positive and DC positive. She has a history of repeated DVT, she was on at [facility] very unfortunately she switch doctors so some of the records is not available to me.   But the daughters state that she was not taking the medication so that her doctor stopped it. She has chronic swelling of her right lower extremity, she attributes this to right knee surgery. She was seen by lymphedema clinic but she is not wearing any stockings. Although in the chart it states that she had history of stroke and cirrhosis, she denies both diagnoses (MRI showed multiple old infarcts)   After discussion, considering her age and comorbidities, we decided not to proceed with any adjuvant therapy. And she has done well without any evidence of recurrence. INTERIM HISTORY   The patient comes in today for a follow-up, she overall feels well without any problems. Continues to complain of pain in the surgical area. We decided on a compound cream but insurance will not cover, she had a lidocaine cream but she did not use it. The patient continues to smoke very heavily. She also has pain and limitation of movement in the right shoulder. All related to the scar from her left axillary surgery. The patient is not active and we talked about trying to do range of motion activity. PAST MEDICAL HISTORY: has a past medical history of Abnormal kidney function; Acid reflux disease; Anesthesia; Arthritis; Bell's palsy; Cancer ([address] Cerebrovascular disease; COPD (chronic obstructive pulmonary disease) ([address] Deep vein thrombosis (DVT) ([address] Edema; History of cancer of left breast; Hx of blood clots; Hyperlipidemia; Hypertension; Pancreatitis; Wears dentures; and Wears glasses. PAST SURGICAL HISTORY: has a past surgical history that includes Cholecystectomy (2011); Hysterectomy; Stomach surgery; Total knee arthroplasty (Right); Tubal ligation; Appendectomy; Breast lumpectomy (Left, 07/14/2016); sono guide needle biopsy (06/2016); and Mastectomy (Left, 09/12/2016). CURRENT MEDICATIONS:  has a current medication list which includes the following prescription(s): furosemide, pantoprazole, carvedilol, hydralazine, simvastatin, clopidogrel, and diclofenac sodium. ALLERGIES:  is allergic to latex and asa [aspirin]. FAMILY HISTORY:   Family History   Problem Relation Age of Onset    High Blood Pressure Mother     Heart Disease Mother     Other Father      TB       SOCIAL HISTORY:  reports that she has been smoking. She has been smoking about 1.00 pack per day. She has never used smokeless tobacco. She reports that she drinks alcohol. She reports that she does not use drugs. REVIEW OF SYSTEMS:     General: no fever or night sweats, Weight is stable. She has chronic swelling and lower extremity especially the right . unchanged  ENT: No double or blurred vision, no tinnitus or hearing problem, no dysphagia or sore throat   Respiratory: No chest pain, no shortness of breath, no cough or hemoptysis. Cardiovascular: Denies chest pain, PND or orthopnea. No L E swelling or palpitations. Gastrointestinal:    She has chronic abdominal pain. She has history of chronic pancreatitis. no nausea ,no vomiting, no diarrhea   Genitourinary: Denies dysuria, hematuria, frequency, urgency or incontinence. Neurological: Denies headaches, decreased LOC, examination is difficult but no focal deficit is appreciated   Musculoskeletal:  No arthralgia no back pain or joint swelling. Skin: There are no rashes or bleeding. Psychiatric:  No anxiety, no depression. Endocrine: no diabetes or thyroid disease. Hematologic: no bleeding , no adenopathy. PHYSICAL EXAM:  The patient is not in acute distress. Wheelchair bound Vital signs: Blood pressure (!) 158/88, pulse 80, temperature 98.5 °F (36.9 °C), temperature source Oral, resp. rate 20. HEENT:  Eyes are normal. Ears, nose and throat are normal.  Neck: Supple. No lymph node enlargement. No thyroid enlargement. Trachea is centrally located. Chest:  Clear to auscultation. No wheezes or crepitations.   Breast:  Post left mastectomy with healed incision, skin is tight no infection , the

## 2018-02-20 ENCOUNTER — TELEPHONE (OUTPATIENT)
Dept: ONCOLOGY | Age: 78
End: 2018-02-20

## 2018-02-20 NOTE — TELEPHONE ENCOUNTER
Patient is currently scheduled at [facility] on 05/29/18 in the afternoon, we need to reschedule the appointment as Dr. Иван Marie will be there only in the morning. Voice mail full and unable to leave message. A letter was sent on 2/2/18 to contact the office about this appointment with her letter about the new office location.

## 2018-04-23 ENCOUNTER — HOSPITAL ENCOUNTER (OUTPATIENT)
Dept: WOMENS IMAGING | Age: 78
Discharge: HOME | End: 2018-04-25
Payer: MEDICARE

## 2018-04-23 ENCOUNTER — OFFICE VISIT (OUTPATIENT)
Dept: FAMILY MEDICINE CLINIC | Age: 78
End: 2018-04-23
Payer: MEDICARE

## 2018-04-23 VITALS
BODY MASS INDEX: 28.74 KG/M2 | RESPIRATION RATE: 12 BRPM | DIASTOLIC BLOOD PRESSURE: 90 MMHG | SYSTOLIC BLOOD PRESSURE: 140 MMHG | WEIGHT: 146.4 LBS | HEIGHT: 60 IN | TEMPERATURE: 97.6 F

## 2018-04-23 DIAGNOSIS — I89.0 LYMPHEDEMA OF RIGHT LOWER EXTREMITY: ICD-10-CM

## 2018-04-23 DIAGNOSIS — E78.2 MIXED HYPERLIPIDEMIA: ICD-10-CM

## 2018-04-23 DIAGNOSIS — K21.9 GASTROESOPHAGEAL REFLUX DISEASE WITHOUT ESOPHAGITIS: ICD-10-CM

## 2018-04-23 DIAGNOSIS — Z12.31 ENCOUNTER FOR SCREENING MAMMOGRAM FOR MALIGNANT NEOPLASM OF BREAST: ICD-10-CM

## 2018-04-23 DIAGNOSIS — I10 ESSENTIAL HYPERTENSION: Primary | Chronic | ICD-10-CM

## 2018-04-23 DIAGNOSIS — Z17.0 MALIGNANT NEOPLASM OF UPPER-OUTER QUADRANT OF LEFT BREAST IN FEMALE, ESTROGEN RECEPTOR POSITIVE (HCC): Chronic | ICD-10-CM

## 2018-04-23 DIAGNOSIS — C50.412 MALIGNANT NEOPLASM OF UPPER-OUTER QUADRANT OF LEFT BREAST IN FEMALE, ESTROGEN RECEPTOR POSITIVE (HCC): Chronic | ICD-10-CM

## 2018-04-23 PROCEDURE — G8427 DOCREV CUR MEDS BY ELIG CLIN: HCPCS | Performed by: NURSE PRACTITIONER

## 2018-04-23 PROCEDURE — 4004F PT TOBACCO SCREEN RCVD TLK: CPT | Performed by: NURSE PRACTITIONER

## 2018-04-23 PROCEDURE — 77063 BREAST TOMOSYNTHESIS BI: CPT

## 2018-04-23 PROCEDURE — 1090F PRES/ABSN URINE INCON ASSESS: CPT | Performed by: NURSE PRACTITIONER

## 2018-04-23 PROCEDURE — 1123F ACP DISCUSS/DSCN MKR DOCD: CPT | Performed by: NURSE PRACTITIONER

## 2018-04-23 PROCEDURE — G8417 CALC BMI ABV UP PARAM F/U: HCPCS | Performed by: NURSE PRACTITIONER

## 2018-04-23 PROCEDURE — 4040F PNEUMOC VAC/ADMIN/RCVD: CPT | Performed by: NURSE PRACTITIONER

## 2018-04-23 PROCEDURE — G8400 PT W/DXA NO RESULTS DOC: HCPCS | Performed by: NURSE PRACTITIONER

## 2018-04-23 PROCEDURE — 99214 OFFICE O/P EST MOD 30 MIN: CPT | Performed by: NURSE PRACTITIONER

## 2018-04-23 RX ORDER — PANTOPRAZOLE SODIUM 40 MG/1
40 TABLET, DELAYED RELEASE ORAL DAILY
Qty: 90 TABLET | Refills: 1 | Status: SHIPPED | OUTPATIENT
Start: 2018-04-23 | End: 2019-01-01 | Stop reason: SDUPTHER

## 2018-04-23 RX ORDER — CLOPIDOGREL BISULFATE 75 MG/1
TABLET ORAL
Qty: 90 TABLET | Refills: 1 | Status: SHIPPED | OUTPATIENT
Start: 2018-04-23 | End: 2018-07-16 | Stop reason: SDUPTHER

## 2018-04-23 RX ORDER — HYDRALAZINE HYDROCHLORIDE 50 MG/1
50 TABLET, FILM COATED ORAL 3 TIMES DAILY
Qty: 90 TABLET | Refills: 0 | Status: SHIPPED | OUTPATIENT
Start: 2018-04-23 | End: 2018-07-16 | Stop reason: SDUPTHER

## 2018-04-23 ASSESSMENT — ENCOUNTER SYMPTOMS
NAUSEA: 0
WHEEZING: 0
SHORTNESS OF BREATH: 0
VOMITING: 0
ABDOMINAL PAIN: 0

## 2018-05-09 ENCOUNTER — TELEPHONE (OUTPATIENT)
Dept: ONCOLOGY | Age: 78
End: 2018-05-09

## 2018-05-23 ENCOUNTER — OFFICE VISIT (OUTPATIENT)
Dept: FAMILY MEDICINE CLINIC | Age: 78
End: 2018-05-23
Payer: MEDICARE

## 2018-05-23 VITALS
WEIGHT: 142.6 LBS | BODY MASS INDEX: 28 KG/M2 | TEMPERATURE: 97.2 F | DIASTOLIC BLOOD PRESSURE: 80 MMHG | RESPIRATION RATE: 14 BRPM | HEART RATE: 68 BPM | SYSTOLIC BLOOD PRESSURE: 138 MMHG | HEIGHT: 60 IN

## 2018-05-23 DIAGNOSIS — N18.30 STAGE 3 CHRONIC KIDNEY DISEASE (HCC): ICD-10-CM

## 2018-05-23 DIAGNOSIS — D64.9 ANEMIA, UNSPECIFIED TYPE: ICD-10-CM

## 2018-05-23 DIAGNOSIS — I10 ESSENTIAL HYPERTENSION: Primary | Chronic | ICD-10-CM

## 2018-05-23 DIAGNOSIS — E78.5 HYPERLIPIDEMIA, UNSPECIFIED HYPERLIPIDEMIA TYPE: Chronic | ICD-10-CM

## 2018-05-23 PROCEDURE — G8417 CALC BMI ABV UP PARAM F/U: HCPCS | Performed by: NURSE PRACTITIONER

## 2018-05-23 PROCEDURE — 1123F ACP DISCUSS/DSCN MKR DOCD: CPT | Performed by: NURSE PRACTITIONER

## 2018-05-23 PROCEDURE — G8427 DOCREV CUR MEDS BY ELIG CLIN: HCPCS | Performed by: NURSE PRACTITIONER

## 2018-05-23 PROCEDURE — 1090F PRES/ABSN URINE INCON ASSESS: CPT | Performed by: NURSE PRACTITIONER

## 2018-05-23 PROCEDURE — G8400 PT W/DXA NO RESULTS DOC: HCPCS | Performed by: NURSE PRACTITIONER

## 2018-05-23 PROCEDURE — 4004F PT TOBACCO SCREEN RCVD TLK: CPT | Performed by: NURSE PRACTITIONER

## 2018-05-23 PROCEDURE — 4040F PNEUMOC VAC/ADMIN/RCVD: CPT | Performed by: NURSE PRACTITIONER

## 2018-05-23 PROCEDURE — 99214 OFFICE O/P EST MOD 30 MIN: CPT | Performed by: NURSE PRACTITIONER

## 2018-05-23 ASSESSMENT — ENCOUNTER SYMPTOMS
VOMITING: 0
WHEEZING: 0
SHORTNESS OF BREATH: 0
NAUSEA: 0
ABDOMINAL PAIN: 0

## 2018-05-31 ENCOUNTER — HOSPITAL ENCOUNTER (OUTPATIENT)
Facility: MEDICAL CENTER | Age: 78
End: 2018-05-31
Payer: MEDICARE

## 2018-06-08 ENCOUNTER — HOSPITAL ENCOUNTER (OUTPATIENT)
Facility: MEDICAL CENTER | Age: 78
End: 2018-06-08
Payer: MEDICARE

## 2018-06-11 ENCOUNTER — TELEPHONE (OUTPATIENT)
Dept: INFUSION THERAPY | Facility: MEDICAL CENTER | Age: 78
End: 2018-06-11

## 2018-06-18 ENCOUNTER — HOSPITAL ENCOUNTER (OUTPATIENT)
Facility: MEDICAL CENTER | Age: 78
End: 2018-06-18
Payer: MEDICARE

## 2018-07-05 ENCOUNTER — HOSPITAL ENCOUNTER (OUTPATIENT)
Facility: MEDICAL CENTER | Age: 78
End: 2018-07-05
Payer: MEDICARE

## 2018-07-06 ENCOUNTER — TELEPHONE (OUTPATIENT)
Dept: INFUSION THERAPY | Facility: MEDICAL CENTER | Age: 78
End: 2018-07-06

## 2018-07-12 ENCOUNTER — HOSPITAL ENCOUNTER (OUTPATIENT)
Facility: MEDICAL CENTER | Age: 78
End: 2018-07-12
Payer: MEDICARE

## 2018-07-16 ENCOUNTER — TELEPHONE (OUTPATIENT)
Dept: ONCOLOGY | Age: 78
End: 2018-07-16

## 2018-07-16 ENCOUNTER — OFFICE VISIT (OUTPATIENT)
Dept: ONCOLOGY | Age: 78
End: 2018-07-16
Payer: MEDICARE

## 2018-07-16 VITALS
BODY MASS INDEX: 28.32 KG/M2 | WEIGHT: 145 LBS | DIASTOLIC BLOOD PRESSURE: 91 MMHG | RESPIRATION RATE: 18 BRPM | TEMPERATURE: 98.7 F | SYSTOLIC BLOOD PRESSURE: 181 MMHG | HEART RATE: 63 BPM

## 2018-07-16 DIAGNOSIS — C50.412 MALIGNANT NEOPLASM OF UPPER-OUTER QUADRANT OF LEFT BREAST IN FEMALE, ESTROGEN RECEPTOR POSITIVE (HCC): Primary | Chronic | ICD-10-CM

## 2018-07-16 DIAGNOSIS — I10 ESSENTIAL HYPERTENSION: Chronic | ICD-10-CM

## 2018-07-16 DIAGNOSIS — Z17.0 MALIGNANT NEOPLASM OF UPPER-OUTER QUADRANT OF LEFT BREAST IN FEMALE, ESTROGEN RECEPTOR POSITIVE (HCC): Primary | Chronic | ICD-10-CM

## 2018-07-16 PROCEDURE — G8417 CALC BMI ABV UP PARAM F/U: HCPCS | Performed by: INTERNAL MEDICINE

## 2018-07-16 PROCEDURE — G8427 DOCREV CUR MEDS BY ELIG CLIN: HCPCS | Performed by: INTERNAL MEDICINE

## 2018-07-16 PROCEDURE — 99211 OFF/OP EST MAY X REQ PHY/QHP: CPT | Performed by: INTERNAL MEDICINE

## 2018-07-16 PROCEDURE — 1090F PRES/ABSN URINE INCON ASSESS: CPT | Performed by: INTERNAL MEDICINE

## 2018-07-16 PROCEDURE — 4004F PT TOBACCO SCREEN RCVD TLK: CPT | Performed by: INTERNAL MEDICINE

## 2018-07-16 PROCEDURE — G8400 PT W/DXA NO RESULTS DOC: HCPCS | Performed by: INTERNAL MEDICINE

## 2018-07-16 PROCEDURE — 1101F PT FALLS ASSESS-DOCD LE1/YR: CPT | Performed by: INTERNAL MEDICINE

## 2018-07-16 PROCEDURE — 4040F PNEUMOC VAC/ADMIN/RCVD: CPT | Performed by: INTERNAL MEDICINE

## 2018-07-16 PROCEDURE — 99214 OFFICE O/P EST MOD 30 MIN: CPT | Performed by: INTERNAL MEDICINE

## 2018-07-16 PROCEDURE — 1123F ACP DISCUSS/DSCN MKR DOCD: CPT | Performed by: INTERNAL MEDICINE

## 2018-07-16 RX ORDER — CLOPIDOGREL BISULFATE 75 MG/1
TABLET ORAL
Qty: 90 TABLET | Refills: 1 | Status: SHIPPED | OUTPATIENT
Start: 2018-07-16 | End: 2019-01-01 | Stop reason: SDUPTHER

## 2018-07-16 RX ORDER — HYDRALAZINE HYDROCHLORIDE 50 MG/1
50 TABLET, FILM COATED ORAL 3 TIMES DAILY
Qty: 90 TABLET | Refills: 1 | Status: SHIPPED | OUTPATIENT
Start: 2018-07-16 | End: 2019-01-01 | Stop reason: SDUPTHER

## 2018-07-16 RX ORDER — CARVEDILOL 25 MG/1
25 TABLET ORAL 2 TIMES DAILY WITH MEALS
Qty: 180 TABLET | Refills: 1 | Status: SHIPPED | OUTPATIENT
Start: 2018-07-16 | End: 2019-01-01 | Stop reason: SDUPTHER

## 2018-07-16 NOTE — TELEPHONE ENCOUNTER
No refills available for patient. Please address.       Date of Last Visit:  Date of next visit:7/27/2018

## 2018-07-16 NOTE — PROGRESS NOTES
diagnoses (MRI showed multiple old infarcts)   After discussion, considering her age and comorbidities, we decided not to proceed with any adjuvant therapy. And she has done well without any evidence of recurrence. INTERIM HISTORY:The patient comes in today for a follow-up and to review recent imaging. She reports she recently fell and isn't sure why, she had bloody nose. She has neuritis in surgical axilla area and has script for cream from PCP. PAST MEDICAL HISTORY: has a past medical history of Abnormal kidney function; Acid reflux disease; Anesthesia; Arthritis; Bell's palsy; Cancer [address] Cerebrovascular disease; COPD (chronic obstructive pulmonary disease) ([facility] Deep vein thrombosis (DVT) ([facility] [address] Edema; History of cancer of left breast; Hx of blood clots; Hyperlipidemia; Hypertension; Pancreatitis; Wears dentures; and Wears glasses. PAST SURGICAL HISTORY: has a past surgical history that includes Cholecystectomy (2011); Hysterectomy; Stomach surgery; Total knee arthroplasty (Right); Tubal ligation; Appendectomy; Breast lumpectomy (Left, 07/14/2016); pr sono guide needle biopsy (06/2016); and Mastectomy (Left, 09/12/2016). CURRENT MEDICATIONS:  has a current medication list which includes the following prescription(s): clopidogrel, hydralazine, carvedilol, diclofenac sodium, pantoprazole, furosemide, and simvastatin. ALLERGIES:  is allergic to latex and asa [aspirin]. FAMILY HISTORY:   Family History   Problem Relation Age of Onset    High Blood Pressure Mother     Heart Disease Mother     Other Father         TB       SOCIAL HISTORY:  reports that she has been smoking. She has been smoking about 1.00 pack per day. She has never used smokeless tobacco. She reports that she drinks alcohol. She reports that she does not use drugs. REVIEW OF SYSTEMS:     General: no fever or night sweats, Weight is stable. She has chronic swelling and lower extremity especially the right . unchanged  ENT: No double or blurred vision, no tinnitus or hearing problem, no dysphagia or sore throat   Respiratory: No chest pain, no shortness of breath, no cough or hemoptysis. Cardiovascular: Denies chest pain, PND or orthopnea. No L E swelling or palpitations. Gastrointestinal: She has chronic abdominal pain. She has history of chronic pancreatitis. no nausea ,no vomiting, no diarrhea  Genitourinary: Denies dysuria, hematuria, frequency, urgency or incontinence. Neurological: Denies headaches, decreased LOC, examination is difficult but no focal deficit is appreciated  Musculoskeletal:  No arthralgia no back pain or joint swelling. Skin: There are no rashes or bleeding. Psychiatric:  No anxiety, no depression. Endocrine: no diabetes or thyroid disease. Hematologic: no bleeding , no adenopathy. PHYSICAL EXAM:  The patient is not in acute distress. Wheelchair bound Vital signs: Blood pressure (!) 181/91, pulse 63, temperature 98.7 °F (37.1 °C), temperature source Oral, resp. rate 18, weight 145 lb (65.8 kg). HEENT:  Eyes are normal. Ears, nose and throat are normal.  Neck: Supple. No lymph node enlargement. No thyroid enlargement. Trachea is centrally located. Chest:  Clear to auscultation. No wheezes or crepitations. Breast:  Post left mastectomy well haled, skin is tight no infection, the right breast is free of any masses or adenopathy  Heart: Regular sinus rhythm. Abdomen: Soft, nontender. No hepatosplenomegaly. No masses. Extremities:  Significant edema and lymphedema especially of the right lower extremity. The left is somewhat swollen as well  Lymph Nodes:  No cervical, axillary or inguinal lymph node enlargement. Neurologic:  Conscious and oriented. No focal neurological deficits. Psychosocial: No depression, anxiety or stress. Skin: No rashes, bruises or ecchymoses.       REVIEW OF LABORATORY DATA:  Lab Results   Component Value Date    WBC 4.8 07/23/2017    HGB 9.5 (L)

## 2018-07-27 ENCOUNTER — OFFICE VISIT (OUTPATIENT)
Dept: FAMILY MEDICINE CLINIC | Age: 78
End: 2018-07-27
Payer: MEDICARE

## 2018-07-27 VITALS
BODY MASS INDEX: 28.9 KG/M2 | SYSTOLIC BLOOD PRESSURE: 130 MMHG | HEART RATE: 64 BPM | TEMPERATURE: 97.2 F | WEIGHT: 148 LBS | DIASTOLIC BLOOD PRESSURE: 98 MMHG | RESPIRATION RATE: 14 BRPM

## 2018-07-27 DIAGNOSIS — E78.2 MIXED HYPERLIPIDEMIA: ICD-10-CM

## 2018-07-27 DIAGNOSIS — M25.562 ACUTE PAIN OF LEFT KNEE: ICD-10-CM

## 2018-07-27 DIAGNOSIS — I10 ESSENTIAL HYPERTENSION: Primary | ICD-10-CM

## 2018-07-27 DIAGNOSIS — N18.30 STAGE 3 CHRONIC KIDNEY DISEASE (HCC): ICD-10-CM

## 2018-07-27 DIAGNOSIS — S00.83XA CONTUSION OF FACE, INITIAL ENCOUNTER: ICD-10-CM

## 2018-07-27 PROCEDURE — 1090F PRES/ABSN URINE INCON ASSESS: CPT | Performed by: NURSE PRACTITIONER

## 2018-07-27 PROCEDURE — 1100F PTFALLS ASSESS-DOCD GE2>/YR: CPT | Performed by: NURSE PRACTITIONER

## 2018-07-27 PROCEDURE — 4040F PNEUMOC VAC/ADMIN/RCVD: CPT | Performed by: NURSE PRACTITIONER

## 2018-07-27 PROCEDURE — 99214 OFFICE O/P EST MOD 30 MIN: CPT | Performed by: NURSE PRACTITIONER

## 2018-07-27 PROCEDURE — G8417 CALC BMI ABV UP PARAM F/U: HCPCS | Performed by: NURSE PRACTITIONER

## 2018-07-27 PROCEDURE — G8400 PT W/DXA NO RESULTS DOC: HCPCS | Performed by: NURSE PRACTITIONER

## 2018-07-27 PROCEDURE — 3288F FALL RISK ASSESSMENT DOCD: CPT | Performed by: NURSE PRACTITIONER

## 2018-07-27 PROCEDURE — G8427 DOCREV CUR MEDS BY ELIG CLIN: HCPCS | Performed by: NURSE PRACTITIONER

## 2018-07-27 PROCEDURE — 4004F PT TOBACCO SCREEN RCVD TLK: CPT | Performed by: NURSE PRACTITIONER

## 2018-07-27 PROCEDURE — 1123F ACP DISCUSS/DSCN MKR DOCD: CPT | Performed by: NURSE PRACTITIONER

## 2018-07-27 RX ORDER — ACETAMINOPHEN 500 MG
500 TABLET ORAL EVERY 6 HOURS PRN
Qty: 40 TABLET | Refills: 3 | Status: ON HOLD | OUTPATIENT
Start: 2018-07-27 | End: 2019-01-01 | Stop reason: HOSPADM

## 2018-07-27 ASSESSMENT — PATIENT HEALTH QUESTIONNAIRE - PHQ9
2. FEELING DOWN, DEPRESSED OR HOPELESS: 0
SUM OF ALL RESPONSES TO PHQ9 QUESTIONS 1 & 2: 0
1. LITTLE INTEREST OR PLEASURE IN DOING THINGS: 0
SUM OF ALL RESPONSES TO PHQ QUESTIONS 1-9: 0

## 2018-07-27 ASSESSMENT — ENCOUNTER SYMPTOMS
WHEEZING: 0
VOMITING: 0
SHORTNESS OF BREATH: 0
ABDOMINAL PAIN: 0
NAUSEA: 0

## 2018-07-27 NOTE — PROGRESS NOTES
Sig: Take 1 tablet by mouth every 6 hours as needed for Pain       There are no discontinued medications. Maria Del Rosario Mansfield received counseling on the following healthy behaviors: nutrition, exercise . Reviewed prior labs and health maintenance  Continue current medications, diet and exercise. Discussed use, benefit, and side effects of prescribed medications. Barriers to medication compliance addressed. Patient given educational materials - see patient instructions  Was a self-tracking handout given in paper form or via for; to (do) [facility]? Yes    Requested Prescriptions     Signed Prescriptions Disp Refills    acetaminophen (APAP EXTRA STRENGTH) 500 MG tablet 40 tablet 3     Sig: Take 1 tablet by mouth every 6 hours as needed for Pain       All patient questions answered. Patient voiced understanding. Quality Measures    Body mass index is 28.9 kg/m². Elevated. Weight control planned discussed Healthy diet and regular exercise. BP: (!) 130/98. Blood pressure is high. Treatment plan consists of Weight Reduction, Dietary Sodium Restriction and noncompliance issues discussed. .    Fall Risk 7/27/2018 7/11/2017 3/29/2016   2 or more falls in past year? no no no   Fall with injury in past year? no no no     The patient has a history of falls. I did , complete a risk assessment for falls. A plan of care for falls in-office gait and balance testing performed using The Timed Up and Go Test was positive for increased falls risk. Home safety tips provided.      Lab Results   Component Value Date    LDLCHOLESTEROL 78 06/27/2016    (goal LDL reduction with dx if diabetes is 50% LDL reduction)    PHQ Scores 7/27/2018 7/11/2017 3/29/2016   PHQ2 Score 0 0 2   PHQ9 Score 0 0 2     Interpretation of Total Score Depression Severity: 1-4 = Minimal depression, 5-9 = Mild depression, 10-14 = Moderate depression, 15-19 = Moderately severe depression, 20-27 = Severe depression

## 2018-08-08 ENCOUNTER — OFFICE VISIT (OUTPATIENT)
Dept: FAMILY MEDICINE CLINIC | Age: 78
End: 2018-08-08
Payer: MEDICARE

## 2018-08-08 VITALS
BODY MASS INDEX: 28.9 KG/M2 | TEMPERATURE: 97.4 F | WEIGHT: 148 LBS | DIASTOLIC BLOOD PRESSURE: 80 MMHG | HEART RATE: 60 BPM | SYSTOLIC BLOOD PRESSURE: 139 MMHG

## 2018-08-08 DIAGNOSIS — M25.562 ACUTE PAIN OF LEFT KNEE: ICD-10-CM

## 2018-08-08 DIAGNOSIS — E87.6 HYPOKALEMIA: ICD-10-CM

## 2018-08-08 DIAGNOSIS — I10 ESSENTIAL HYPERTENSION: Primary | ICD-10-CM

## 2018-08-08 PROCEDURE — 4040F PNEUMOC VAC/ADMIN/RCVD: CPT | Performed by: NURSE PRACTITIONER

## 2018-08-08 PROCEDURE — 99214 OFFICE O/P EST MOD 30 MIN: CPT | Performed by: NURSE PRACTITIONER

## 2018-08-08 PROCEDURE — 1123F ACP DISCUSS/DSCN MKR DOCD: CPT | Performed by: NURSE PRACTITIONER

## 2018-08-08 PROCEDURE — 1090F PRES/ABSN URINE INCON ASSESS: CPT | Performed by: NURSE PRACTITIONER

## 2018-08-08 PROCEDURE — G8427 DOCREV CUR MEDS BY ELIG CLIN: HCPCS | Performed by: NURSE PRACTITIONER

## 2018-08-08 PROCEDURE — 4004F PT TOBACCO SCREEN RCVD TLK: CPT | Performed by: NURSE PRACTITIONER

## 2018-08-08 PROCEDURE — 1101F PT FALLS ASSESS-DOCD LE1/YR: CPT | Performed by: NURSE PRACTITIONER

## 2018-08-08 PROCEDURE — G8400 PT W/DXA NO RESULTS DOC: HCPCS | Performed by: NURSE PRACTITIONER

## 2018-08-08 PROCEDURE — G8417 CALC BMI ABV UP PARAM F/U: HCPCS | Performed by: NURSE PRACTITIONER

## 2018-08-08 ASSESSMENT — ENCOUNTER SYMPTOMS
WHEEZING: 0
SHORTNESS OF BREATH: 0
NAUSEA: 0
ABDOMINAL PAIN: 0
VOMITING: 0

## 2018-08-08 NOTE — PROGRESS NOTES
Subjective:      Patient ID: Gissel Holm is a 65 y.o. female. Chronic Disease Visit Information    BP Readings from Last 3 Encounters:   08/08/18 139/80   07/27/18 (!) 130/98   07/16/18 (!) 181/91          LDL Cholesterol (mg/dL)   Date Value   06/27/2016 78     HDL (mg/dL)   Date Value   06/27/2016 44     BUN (mg/dL)   Date Value   08/07/2017 18     CREATININE (mg/dL)   Date Value   08/07/2017 1.25 (H)     Glucose (mg/dL)   Date Value   08/07/2017 69 (L)            Have you changed or started any medications since your last visit including any over-the-counter medicines, vitamins, or herbal medicines? no   Are you having any side effects from any of your medications? -  no  Have you stopped taking any of your medications? Is so, why? -  no    Have you seen any other physician or provider since your last visit? No  Have you had any other diagnostic tests since your last visit? No  Have you been seen in the emergency room and/or had an admission to a hospital since we last saw you? No  Have you had your annual diabetic retinal (eye) exam? No  Have you had your routine dental cleaning in the past 6 months? yes -     Have you activated your [employer] account? If not, what are your barriers? Yes     Patient Care Team:  Terell Bernal MD as PCP - General (Family Medicine)         Medical History Review  Past Medical, Family, and Social History reviewed and does contribute to the patient presenting condition    Health Maintenance   Topic Date Due    Shingles Vaccine (1 of 2 - 2 Dose Series) 12/19/1989    Potassium monitoring  08/07/2018    Creatinine monitoring  08/07/2018    DTaP/Tdap/Td vaccine (1 - Tdap) 10/06/2021 (Originally 12/19/1959)    Flu vaccine (1) 09/01/2018    DEXA (modify frequency per FRAX score)  Addressed    Pneumococcal low/med risk  Completed     HPI  65year old female accompanied by her daughter presents with management of uncontrolled HTN and left knee pain.  Currently is on Pulse 60   Temp 97.4 °F (36.3 °C) (Oral)   Wt 148 lb (67.1 kg)   BMI 28.90 kg/m²   Lab Results   Component Value Date    WBC 4.8 07/23/2017    HGB 9.5 (L) 07/23/2017    HCT 29.9 (L) 07/23/2017     07/23/2017    CHOL 138 06/27/2016    TRIG 79 06/27/2016    HDL 44 06/27/2016    ALT 10 07/11/2017    AST 15 07/11/2017     08/07/2017    K 3.3 (L) 08/07/2017     08/07/2017    CREATININE 1.25 (H) 08/07/2017    BUN 18 08/07/2017    CO2 26 08/07/2017    INR 1.1 06/17/2017     Lab Results   Component Value Date    CALCIUM 8.3 (L) 08/07/2017     Lab Results   Component Value Date    LDLCHOLESTEROL 78 06/27/2016         1. Uncontrolled Essential hypertension  - 139/80 when repeated.   -advised to cont bp medications as prescribed. - There have been some probable medication compliance issues here. I have discussed with her the great importance of following the treatment plan exactly as directed in order to achieve a good medical outcome. 2. Acute pain of left knee  - cont tylenol as needed for pain  - advised to complete xr as ordered. 3. Hypokalemia  - advised to complete the blood work as ordered in may. - There have been some probable compliance issues here. I have discussed with her the great importance of following the treatment plan exactly as directed in order to achieve a good medical outcome. Requested Prescriptions      No prescriptions requested or ordered in this encounter       There are no discontinued medications. Maria Del Rosario Mansfield received counseling on the following healthy behaviors: nutrition, exercise and weight reduction  Reviewed prior labs and health maintenance  Continue current medications, diet and exercise. Discussed use, benefit, and side effects of prescribed medications. Barriers to medication compliance addressed. Patient given educational materials - see patient instructions  Was a self-tracking handout given in paper form or via [employer]?  Yes    Requested

## 2018-10-31 ENCOUNTER — HOSPITAL ENCOUNTER (OUTPATIENT)
Age: 78
Discharge: HOME | End: 2018-10-31
Payer: MEDICARE

## 2018-10-31 ENCOUNTER — OFFICE VISIT (OUTPATIENT)
Dept: FAMILY MEDICINE CLINIC | Age: 78
End: 2018-10-31
Payer: MEDICARE

## 2018-10-31 VITALS
RESPIRATION RATE: 14 BRPM | TEMPERATURE: 97.4 F | WEIGHT: 148 LBS | DIASTOLIC BLOOD PRESSURE: 80 MMHG | BODY MASS INDEX: 28.9 KG/M2 | SYSTOLIC BLOOD PRESSURE: 130 MMHG | HEART RATE: 64 BPM

## 2018-10-31 DIAGNOSIS — I10 ESSENTIAL HYPERTENSION: Chronic | ICD-10-CM

## 2018-10-31 DIAGNOSIS — E78.2 MIXED HYPERLIPIDEMIA: ICD-10-CM

## 2018-10-31 DIAGNOSIS — I10 ESSENTIAL HYPERTENSION: Primary | ICD-10-CM

## 2018-10-31 DIAGNOSIS — D64.9 ANEMIA, UNSPECIFIED TYPE: ICD-10-CM

## 2018-10-31 DIAGNOSIS — N18.30 STAGE 3 CHRONIC KIDNEY DISEASE (HCC): ICD-10-CM

## 2018-10-31 DIAGNOSIS — D50.9 IRON DEFICIENCY ANEMIA, UNSPECIFIED IRON DEFICIENCY ANEMIA TYPE: ICD-10-CM

## 2018-10-31 DIAGNOSIS — E78.5 HYPERLIPIDEMIA, UNSPECIFIED HYPERLIPIDEMIA TYPE: Chronic | ICD-10-CM

## 2018-10-31 DIAGNOSIS — Z23 NEED FOR INFLUENZA VACCINATION: ICD-10-CM

## 2018-10-31 PROCEDURE — 99214 OFFICE O/P EST MOD 30 MIN: CPT | Performed by: NURSE PRACTITIONER

## 2018-10-31 PROCEDURE — 90662 IIV NO PRSV INCREASED AG IM: CPT | Performed by: NURSE PRACTITIONER

## 2018-10-31 PROCEDURE — G8427 DOCREV CUR MEDS BY ELIG CLIN: HCPCS | Performed by: NURSE PRACTITIONER

## 2018-10-31 PROCEDURE — G0008 ADMIN INFLUENZA VIRUS VAC: HCPCS | Performed by: NURSE PRACTITIONER

## 2018-10-31 PROCEDURE — G8400 PT W/DXA NO RESULTS DOC: HCPCS | Performed by: NURSE PRACTITIONER

## 2018-10-31 PROCEDURE — 1100F PTFALLS ASSESS-DOCD GE2>/YR: CPT | Performed by: NURSE PRACTITIONER

## 2018-10-31 PROCEDURE — 3288F FALL RISK ASSESSMENT DOCD: CPT | Performed by: NURSE PRACTITIONER

## 2018-10-31 PROCEDURE — 4040F PNEUMOC VAC/ADMIN/RCVD: CPT | Performed by: NURSE PRACTITIONER

## 2018-10-31 PROCEDURE — 1090F PRES/ABSN URINE INCON ASSESS: CPT | Performed by: NURSE PRACTITIONER

## 2018-10-31 PROCEDURE — G8417 CALC BMI ABV UP PARAM F/U: HCPCS | Performed by: NURSE PRACTITIONER

## 2018-10-31 PROCEDURE — 1123F ACP DISCUSS/DSCN MKR DOCD: CPT | Performed by: NURSE PRACTITIONER

## 2018-10-31 PROCEDURE — 4004F PT TOBACCO SCREEN RCVD TLK: CPT | Performed by: NURSE PRACTITIONER

## 2018-10-31 PROCEDURE — G8482 FLU IMMUNIZE ORDER/ADMIN: HCPCS | Performed by: NURSE PRACTITIONER

## 2018-10-31 ASSESSMENT — ENCOUNTER SYMPTOMS
WHEEZING: 0
ABDOMINAL PAIN: 0
NAUSEA: 0
SHORTNESS OF BREATH: 0

## 2018-10-31 NOTE — PROGRESS NOTES
topically 2 times daily       All patient questions answered. Patient voiced understanding. Quality Measures    Body mass index is 28.9 kg/m². Elevated. Weight control planned discussed medically supervised diet with primary care physician and Healthy diet and regular exercise. BP: 130/80. Blood pressure is normal. Treatment plan consists of Dietary Sodium Restriction, Increased Physical Activity and No treatment change needed. Fall Risk 7/27/2018 7/11/2017 3/29/2016   2 or more falls in past year? no no no   Fall with injury in past year? no no no     The patient has a history of falls. I did , complete a risk assessment for falls. A plan of care for falls in-office gait and balance testing performed using The Timed Up and Go Test was positive for increased falls risk, home safety tips provided.      Lab Results   Component Value Date    LDLCHOLESTEROL 78 06/27/2016    (goal LDL reduction with dx if diabetes is 50% LDL reduction)    PHQ Scores 7/27/2018 7/11/2017 3/29/2016   PHQ2 Score 0 0 2   PHQ9 Score 0 0 2     Interpretation of Total Score Depression Severity: 1-4 = Minimal depression, 5-9 = Mild depression, 10-14 = Moderate depression, 15-19 = Moderately severe depression, 20-27 = Severe depression          Sharon Morel, APRN - CNP

## 2019-01-01 ENCOUNTER — TELEPHONE (OUTPATIENT)
Dept: FAMILY MEDICINE CLINIC | Age: 79
End: 2019-01-01

## 2019-01-01 ENCOUNTER — APPOINTMENT (OUTPATIENT)
Dept: ULTRASOUND IMAGING | Age: 79
DRG: 069 | End: 2019-01-01
Payer: MEDICARE

## 2019-01-01 ENCOUNTER — TELEPHONE (OUTPATIENT)
Dept: ONCOLOGY | Age: 79
End: 2019-01-01

## 2019-01-01 ENCOUNTER — HOSPITAL ENCOUNTER (INPATIENT)
Age: 79
LOS: 12 days | Discharge: HOSPICE | DRG: 682 | End: 2020-01-14
Attending: EMERGENCY MEDICINE | Admitting: FAMILY MEDICINE
Payer: MEDICARE

## 2019-01-01 ENCOUNTER — APPOINTMENT (OUTPATIENT)
Dept: MRI IMAGING | Age: 79
DRG: 065 | End: 2019-01-01
Payer: MEDICARE

## 2019-01-01 ENCOUNTER — APPOINTMENT (OUTPATIENT)
Dept: GENERAL RADIOLOGY | Age: 79
End: 2019-01-01
Payer: MEDICARE

## 2019-01-01 ENCOUNTER — APPOINTMENT (OUTPATIENT)
Dept: INTERVENTIONAL RADIOLOGY/VASCULAR | Age: 79
End: 2019-01-01
Payer: MEDICARE

## 2019-01-01 ENCOUNTER — HOSPITAL ENCOUNTER (INPATIENT)
Age: 79
LOS: 7 days | Discharge: NURSING, REHAB OR LONG-TERM CARE | DRG: 357 | End: 2019-11-14
Attending: EMERGENCY MEDICINE | Admitting: FAMILY MEDICINE
Payer: MEDICARE

## 2019-01-01 ENCOUNTER — HOSPITAL ENCOUNTER (OUTPATIENT)
Dept: OCCUPATIONAL THERAPY | Age: 79
Setting detail: THERAPIES SERIES
Discharge: HOME | End: 2019-02-11
Payer: MEDICARE

## 2019-01-01 ENCOUNTER — APPOINTMENT (OUTPATIENT)
Dept: GENERAL RADIOLOGY | Age: 79
DRG: 069 | End: 2019-01-01
Payer: MEDICARE

## 2019-01-01 ENCOUNTER — TELEPHONE (OUTPATIENT)
Dept: GASTROENTEROLOGY | Age: 79
End: 2019-01-01

## 2019-01-01 ENCOUNTER — TELEPHONE (OUTPATIENT)
Dept: VASCULAR SURGERY | Age: 79
End: 2019-01-01

## 2019-01-01 ENCOUNTER — APPOINTMENT (OUTPATIENT)
Dept: INTERVENTIONAL RADIOLOGY/VASCULAR | Age: 79
DRG: 357 | End: 2019-01-01
Payer: MEDICARE

## 2019-01-01 ENCOUNTER — OFFICE VISIT (OUTPATIENT)
Dept: FAMILY MEDICINE CLINIC | Age: 79
End: 2019-01-01
Payer: MEDICARE

## 2019-01-01 ENCOUNTER — APPOINTMENT (OUTPATIENT)
Dept: CT IMAGING | Age: 79
DRG: 069 | End: 2019-01-01
Payer: MEDICARE

## 2019-01-01 ENCOUNTER — CARE COORDINATION (OUTPATIENT)
Dept: CARE COORDINATION | Age: 79
End: 2019-01-01

## 2019-01-01 ENCOUNTER — OFFICE VISIT (OUTPATIENT)
Dept: GASTROENTEROLOGY | Age: 79
End: 2019-01-01
Payer: MEDICARE

## 2019-01-01 ENCOUNTER — ANESTHESIA (OUTPATIENT)
Dept: ENDOSCOPY | Age: 79
DRG: 069 | End: 2019-01-01
Payer: MEDICARE

## 2019-01-01 ENCOUNTER — HOSPITAL ENCOUNTER (OUTPATIENT)
Age: 79
Setting detail: OBSERVATION
Discharge: HOME | End: 2019-12-06
Attending: EMERGENCY MEDICINE | Admitting: FAMILY MEDICINE
Payer: MEDICARE

## 2019-01-01 ENCOUNTER — HOSPITAL ENCOUNTER (INPATIENT)
Age: 79
LOS: 5 days | Discharge: NURSING, REHAB OR LONG-TERM CARE | DRG: 300 | End: 2019-10-30
Attending: EMERGENCY MEDICINE | Admitting: FAMILY MEDICINE
Payer: MEDICARE

## 2019-01-01 ENCOUNTER — ANESTHESIA EVENT (OUTPATIENT)
Dept: ENDOSCOPY | Age: 79
DRG: 069 | End: 2019-01-01
Payer: MEDICARE

## 2019-01-01 ENCOUNTER — HOSPITAL ENCOUNTER (OUTPATIENT)
Dept: OCCUPATIONAL THERAPY | Age: 79
Setting detail: THERAPIES SERIES
Discharge: HOME | End: 2019-02-07
Payer: MEDICARE

## 2019-01-01 ENCOUNTER — APPOINTMENT (OUTPATIENT)
Dept: GENERAL RADIOLOGY | Age: 79
DRG: 065 | End: 2019-01-01
Payer: MEDICARE

## 2019-01-01 ENCOUNTER — HOSPITAL ENCOUNTER (OUTPATIENT)
Age: 79
Setting detail: OBSERVATION
Discharge: HOME | End: 2019-11-23
Attending: EMERGENCY MEDICINE | Admitting: FAMILY MEDICINE
Payer: MEDICARE

## 2019-01-01 ENCOUNTER — APPOINTMENT (OUTPATIENT)
Dept: GENERAL RADIOLOGY | Age: 79
DRG: 300 | End: 2019-01-01
Payer: MEDICARE

## 2019-01-01 ENCOUNTER — HOSPITAL ENCOUNTER (OUTPATIENT)
Age: 79
Discharge: HOME | DRG: 300 | End: 2019-10-27
Payer: MEDICARE

## 2019-01-01 ENCOUNTER — APPOINTMENT (OUTPATIENT)
Dept: CT IMAGING | Age: 79
DRG: 065 | End: 2019-01-01
Payer: MEDICARE

## 2019-01-01 ENCOUNTER — HOSPITAL ENCOUNTER (INPATIENT)
Age: 79
LOS: 3 days | Discharge: NURSING, REHAB OR LONG-TERM CARE | DRG: 065 | End: 2019-09-26
Attending: EMERGENCY MEDICINE | Admitting: FAMILY MEDICINE
Payer: MEDICARE

## 2019-01-01 ENCOUNTER — APPOINTMENT (OUTPATIENT)
Dept: CT IMAGING | Age: 79
DRG: 357 | End: 2019-01-01
Payer: MEDICARE

## 2019-01-01 ENCOUNTER — HOSPITAL ENCOUNTER (INPATIENT)
Age: 79
LOS: 3 days | Discharge: NURSING, REHAB OR LONG-TERM CARE | DRG: 069 | End: 2019-08-01
Attending: EMERGENCY MEDICINE | Admitting: FAMILY MEDICINE
Payer: MEDICARE

## 2019-01-01 VITALS
TEMPERATURE: 97.6 F | WEIGHT: 156 LBS | DIASTOLIC BLOOD PRESSURE: 78 MMHG | BODY MASS INDEX: 27.63 KG/M2 | RESPIRATION RATE: 14 BRPM | HEART RATE: 60 BPM | SYSTOLIC BLOOD PRESSURE: 138 MMHG

## 2019-01-01 VITALS
OXYGEN SATURATION: 98 % | WEIGHT: 175.27 LBS | HEIGHT: 62 IN | DIASTOLIC BLOOD PRESSURE: 81 MMHG | BODY MASS INDEX: 32.25 KG/M2 | SYSTOLIC BLOOD PRESSURE: 168 MMHG | TEMPERATURE: 97.7 F | HEART RATE: 74 BPM | RESPIRATION RATE: 16 BRPM

## 2019-01-01 VITALS
SYSTOLIC BLOOD PRESSURE: 162 MMHG | DIASTOLIC BLOOD PRESSURE: 70 MMHG | WEIGHT: 154.1 LBS | BODY MASS INDEX: 31.07 KG/M2 | OXYGEN SATURATION: 100 % | HEIGHT: 59 IN | HEART RATE: 64 BPM | TEMPERATURE: 98.4 F | RESPIRATION RATE: 14 BRPM

## 2019-01-01 VITALS
HEART RATE: 72 BPM | TEMPERATURE: 97.9 F | RESPIRATION RATE: 14 BRPM | DIASTOLIC BLOOD PRESSURE: 62 MMHG | SYSTOLIC BLOOD PRESSURE: 132 MMHG | BODY MASS INDEX: 26.16 KG/M2 | WEIGHT: 143 LBS

## 2019-01-01 VITALS
SYSTOLIC BLOOD PRESSURE: 146 MMHG | TEMPERATURE: 97.4 F | RESPIRATION RATE: 16 BRPM | OXYGEN SATURATION: 100 % | BODY MASS INDEX: 30.89 KG/M2 | HEART RATE: 61 BPM | WEIGHT: 153.22 LBS | DIASTOLIC BLOOD PRESSURE: 66 MMHG | HEIGHT: 59 IN

## 2019-01-01 VITALS — SYSTOLIC BLOOD PRESSURE: 118 MMHG | DIASTOLIC BLOOD PRESSURE: 65 MMHG | OXYGEN SATURATION: 99 % | HEART RATE: 54 BPM

## 2019-01-01 VITALS
OXYGEN SATURATION: 95 % | WEIGHT: 155.42 LBS | HEART RATE: 69 BPM | TEMPERATURE: 98.5 F | BODY MASS INDEX: 27.54 KG/M2 | SYSTOLIC BLOOD PRESSURE: 128 MMHG | DIASTOLIC BLOOD PRESSURE: 67 MMHG | HEIGHT: 63 IN | RESPIRATION RATE: 14 BRPM

## 2019-01-01 VITALS
RESPIRATION RATE: 14 BRPM | BODY MASS INDEX: 30.27 KG/M2 | TEMPERATURE: 97.1 F | DIASTOLIC BLOOD PRESSURE: 80 MMHG | WEIGHT: 155 LBS | SYSTOLIC BLOOD PRESSURE: 132 MMHG | HEART RATE: 64 BPM

## 2019-01-01 VITALS
HEART RATE: 73 BPM | BODY MASS INDEX: 26.49 KG/M2 | RESPIRATION RATE: 18 BRPM | OXYGEN SATURATION: 97 % | TEMPERATURE: 98.1 F | SYSTOLIC BLOOD PRESSURE: 179 MMHG | HEIGHT: 62 IN | WEIGHT: 143.96 LBS | DIASTOLIC BLOOD PRESSURE: 74 MMHG

## 2019-01-01 VITALS — HEART RATE: 68 BPM | DIASTOLIC BLOOD PRESSURE: 70 MMHG | SYSTOLIC BLOOD PRESSURE: 138 MMHG | TEMPERATURE: 98.6 F

## 2019-01-01 VITALS
BODY MASS INDEX: 30.08 KG/M2 | WEIGHT: 154 LBS | HEART RATE: 72 BPM | RESPIRATION RATE: 14 BRPM | SYSTOLIC BLOOD PRESSURE: 130 MMHG | DIASTOLIC BLOOD PRESSURE: 88 MMHG

## 2019-01-01 VITALS — DIASTOLIC BLOOD PRESSURE: 62 MMHG | SYSTOLIC BLOOD PRESSURE: 129 MMHG | OXYGEN SATURATION: 99 %

## 2019-01-01 VITALS
BODY MASS INDEX: 30.95 KG/M2 | TEMPERATURE: 99.1 F | DIASTOLIC BLOOD PRESSURE: 77 MMHG | HEART RATE: 69 BPM | WEIGHT: 168.21 LBS | OXYGEN SATURATION: 97 % | SYSTOLIC BLOOD PRESSURE: 161 MMHG | HEIGHT: 62 IN | RESPIRATION RATE: 16 BRPM

## 2019-01-01 DIAGNOSIS — L08.9 LOCAL INFECTION OF SKIN AND SUBCUTANEOUS TISSUE: ICD-10-CM

## 2019-01-01 DIAGNOSIS — E78.5 HYPERLIPIDEMIA, UNSPECIFIED HYPERLIPIDEMIA TYPE: ICD-10-CM

## 2019-01-01 DIAGNOSIS — I65.23 BILATERAL CAROTID ARTERY STENOSIS: ICD-10-CM

## 2019-01-01 DIAGNOSIS — I10 ESSENTIAL HYPERTENSION: Primary | Chronic | ICD-10-CM

## 2019-01-01 DIAGNOSIS — R07.9 CHEST PAIN, UNSPECIFIED TYPE: Primary | ICD-10-CM

## 2019-01-01 DIAGNOSIS — D64.9 ANEMIA, UNSPECIFIED TYPE: ICD-10-CM

## 2019-01-01 DIAGNOSIS — K86.1 OTHER CHRONIC PANCREATITIS (HCC): Chronic | ICD-10-CM

## 2019-01-01 DIAGNOSIS — R41.82 ALTERED MENTAL STATUS, UNSPECIFIED ALTERED MENTAL STATUS TYPE: Primary | ICD-10-CM

## 2019-01-01 DIAGNOSIS — K92.2 GASTROINTESTINAL HEMORRHAGE, UNSPECIFIED GASTROINTESTINAL HEMORRHAGE TYPE: Primary | ICD-10-CM

## 2019-01-01 DIAGNOSIS — I89.0 LYMPHEDEMA OF BOTH LOWER EXTREMITIES: Primary | ICD-10-CM

## 2019-01-01 DIAGNOSIS — N94.89 ADNEXAL MASS: ICD-10-CM

## 2019-01-01 DIAGNOSIS — I10 ESSENTIAL HYPERTENSION: ICD-10-CM

## 2019-01-01 DIAGNOSIS — N18.30 CKD (CHRONIC KIDNEY DISEASE) STAGE 3, GFR 30-59 ML/MIN (HCC): ICD-10-CM

## 2019-01-01 DIAGNOSIS — I82.502 CHRONIC DEEP VEIN THROMBOSIS (DVT) OF LEFT LOWER EXTREMITY, UNSPECIFIED VEIN (HCC): ICD-10-CM

## 2019-01-01 DIAGNOSIS — E78.5 HYPERLIPIDEMIA, UNSPECIFIED HYPERLIPIDEMIA TYPE: Chronic | ICD-10-CM

## 2019-01-01 DIAGNOSIS — M79.662 PAIN OF LEFT CALF: ICD-10-CM

## 2019-01-01 DIAGNOSIS — R77.8 ELEVATED TROPONIN: ICD-10-CM

## 2019-01-01 DIAGNOSIS — A63.0 ANAL WARTS: ICD-10-CM

## 2019-01-01 DIAGNOSIS — K21.9 GASTROESOPHAGEAL REFLUX DISEASE WITHOUT ESOPHAGITIS: ICD-10-CM

## 2019-01-01 DIAGNOSIS — Z17.0 MALIGNANT NEOPLASM OF UPPER-OUTER QUADRANT OF LEFT BREAST IN FEMALE, ESTROGEN RECEPTOR POSITIVE (HCC): Chronic | ICD-10-CM

## 2019-01-01 DIAGNOSIS — N18.30 STAGE 3 CHRONIC KIDNEY DISEASE (HCC): ICD-10-CM

## 2019-01-01 DIAGNOSIS — R10.84 GENERALIZED ABDOMINAL PAIN: ICD-10-CM

## 2019-01-01 DIAGNOSIS — I89.0 LYMPHEDEMA OF RIGHT LOWER EXTREMITY: ICD-10-CM

## 2019-01-01 DIAGNOSIS — R10.84 ABDOMINAL PAIN, GENERALIZED: Primary | ICD-10-CM

## 2019-01-01 DIAGNOSIS — R25.1 TREMOR: ICD-10-CM

## 2019-01-01 DIAGNOSIS — Z91.81 AT HIGH RISK FOR FALLS: ICD-10-CM

## 2019-01-01 DIAGNOSIS — M17.12 OSTEOARTHRITIS OF LEFT KNEE, UNSPECIFIED OSTEOARTHRITIS TYPE: Primary | ICD-10-CM

## 2019-01-01 DIAGNOSIS — I63.9 CEREBROVASCULAR ACCIDENT (CVA), UNSPECIFIED MECHANISM (HCC): Primary | ICD-10-CM

## 2019-01-01 DIAGNOSIS — I82.432 ACUTE DEEP VEIN THROMBOSIS (DVT) OF POPLITEAL VEIN OF LEFT LOWER EXTREMITY (HCC): ICD-10-CM

## 2019-01-01 DIAGNOSIS — N30.01 ACUTE CYSTITIS WITH HEMATURIA: ICD-10-CM

## 2019-01-01 DIAGNOSIS — A63.0 ANAL WARTS: Primary | ICD-10-CM

## 2019-01-01 DIAGNOSIS — C50.412 MALIGNANT NEOPLASM OF UPPER-OUTER QUADRANT OF LEFT BREAST IN FEMALE, ESTROGEN RECEPTOR POSITIVE (HCC): Chronic | ICD-10-CM

## 2019-01-01 DIAGNOSIS — N83.8 OVARIAN MASS, LEFT: ICD-10-CM

## 2019-01-01 DIAGNOSIS — L40.9 PSORIASIS: ICD-10-CM

## 2019-01-01 LAB
-: ABNORMAL
-: NORMAL
ABO/RH: NORMAL
ABSOLUTE BANDS #: 0.16 K/UL (ref 0–1)
ABSOLUTE EOS #: 0 K/UL (ref 0–0.4)
ABSOLUTE EOS #: 0.08 K/UL (ref 0–0.4)
ABSOLUTE EOS #: 0.14 K/UL (ref 0–0.4)
ABSOLUTE EOS #: 0.14 K/UL (ref 0–0.4)
ABSOLUTE EOS #: 0.15 K/UL (ref 0–0.4)
ABSOLUTE EOS #: 0.16 K/UL (ref 0–0.4)
ABSOLUTE EOS #: 0.2 K/UL (ref 0–0.4)
ABSOLUTE EOS #: 0.23 K/UL (ref 0–0.4)
ABSOLUTE EOS #: 0.24 K/UL (ref 0–0.4)
ABSOLUTE EOS #: 0.26 K/UL (ref 0–0.4)
ABSOLUTE EOS #: 0.28 K/UL (ref 0–0.4)
ABSOLUTE EOS #: 0.29 K/UL (ref 0–0.4)
ABSOLUTE EOS #: 0.3 K/UL (ref 0–0.4)
ABSOLUTE EOS #: 0.3 K/UL (ref 0–0.4)
ABSOLUTE EOS #: 0.31 K/UL (ref 0–0.4)
ABSOLUTE EOS #: 0.31 K/UL (ref 0–0.4)
ABSOLUTE EOS #: 0.32 K/UL (ref 0–0.4)
ABSOLUTE EOS #: 0.32 K/UL (ref 0–0.4)
ABSOLUTE EOS #: 0.33 K/UL (ref 0–0.4)
ABSOLUTE EOS #: 0.33 K/UL (ref 0–0.4)
ABSOLUTE EOS #: 0.34 K/UL (ref 0–0.4)
ABSOLUTE EOS #: 0.35 K/UL (ref 0–0.4)
ABSOLUTE EOS #: 0.4 K/UL (ref 0–0.4)
ABSOLUTE IMMATURE GRANULOCYTE: ABNORMAL K/UL (ref 0–0.3)
ABSOLUTE LYMPH #: 0.59 K/UL (ref 1–4.8)
ABSOLUTE LYMPH #: 0.72 K/UL (ref 1–4.8)
ABSOLUTE LYMPH #: 0.79 K/UL (ref 1–4.8)
ABSOLUTE LYMPH #: 0.89 K/UL (ref 1–4.8)
ABSOLUTE LYMPH #: 0.9 K/UL (ref 1–4.8)
ABSOLUTE LYMPH #: 0.99 K/UL (ref 1–4.8)
ABSOLUTE LYMPH #: 1 K/UL (ref 1–4.8)
ABSOLUTE LYMPH #: 1.01 K/UL (ref 1–4.8)
ABSOLUTE LYMPH #: 1.12 K/UL (ref 1–4.8)
ABSOLUTE LYMPH #: 1.22 K/UL (ref 1–4.8)
ABSOLUTE LYMPH #: 1.23 K/UL (ref 1–4.8)
ABSOLUTE LYMPH #: 1.27 K/UL (ref 1–4.8)
ABSOLUTE LYMPH #: 1.3 K/UL (ref 1–4.8)
ABSOLUTE LYMPH #: 1.32 K/UL (ref 1–4.8)
ABSOLUTE LYMPH #: 1.34 K/UL (ref 1–4.8)
ABSOLUTE LYMPH #: 1.39 K/UL (ref 1–4.8)
ABSOLUTE LYMPH #: 1.41 K/UL (ref 1–4.8)
ABSOLUTE LYMPH #: 1.45 K/UL (ref 1–4.8)
ABSOLUTE LYMPH #: 1.49 K/UL (ref 1–4.8)
ABSOLUTE LYMPH #: 1.54 K/UL (ref 1–4.8)
ABSOLUTE LYMPH #: 1.58 K/UL (ref 1–4.8)
ABSOLUTE LYMPH #: 1.62 K/UL (ref 1–4.8)
ABSOLUTE LYMPH #: 1.66 K/UL (ref 1–4.8)
ABSOLUTE MONO #: 0.07 K/UL (ref 0.1–1.3)
ABSOLUTE MONO #: 0.16 K/UL (ref 0.1–1.3)
ABSOLUTE MONO #: 0.3 K/UL (ref 0.1–1.3)
ABSOLUTE MONO #: 0.31 K/UL (ref 0.1–1.3)
ABSOLUTE MONO #: 0.32 K/UL (ref 0.1–1.3)
ABSOLUTE MONO #: 0.36 K/UL (ref 0.1–1.3)
ABSOLUTE MONO #: 0.4 K/UL (ref 0.1–1.3)
ABSOLUTE MONO #: 0.4 K/UL (ref 0.1–1.3)
ABSOLUTE MONO #: 0.42 K/UL (ref 0.1–1.3)
ABSOLUTE MONO #: 0.43 K/UL (ref 0.1–1.3)
ABSOLUTE MONO #: 0.44 K/UL (ref 0.1–1.3)
ABSOLUTE MONO #: 0.45 K/UL (ref 0.1–1.3)
ABSOLUTE MONO #: 0.48 K/UL (ref 0.1–1.3)
ABSOLUTE MONO #: 0.49 K/UL (ref 0.1–1.3)
ABSOLUTE MONO #: 0.54 K/UL (ref 0.1–1.3)
ABSOLUTE MONO #: 0.59 K/UL (ref 0.1–1.3)
ABSOLUTE MONO #: 0.6 K/UL (ref 0.1–1.3)
ABSOLUTE MONO #: 0.61 K/UL (ref 0.1–1.3)
ABSOLUTE MONO #: 0.61 K/UL (ref 0.1–1.3)
ABSOLUTE MONO #: 0.64 K/UL (ref 0.1–1.3)
ABSOLUTE MONO #: 0.65 K/UL (ref 0.1–1.3)
ABSOLUTE MONO #: 0.69 K/UL (ref 0.1–1.3)
ABSOLUTE MONO #: 0.7 K/UL (ref 0.1–1.3)
ABSOLUTE MONO #: 0.7 K/UL (ref 0.1–1.3)
ABSOLUTE MONO #: 0.84 K/UL (ref 0.1–1.3)
ABSOLUTE RETIC #: 0.09 M/UL (ref 0.02–0.1)
ABSOLUTE RETIC #: 0.11 M/UL (ref 0.02–0.1)
ALBUMIN (CALCULATED): 3.4 G/DL (ref 3.2–5.2)
ALBUMIN PERCENT: 56 % (ref 45–65)
ALBUMIN SERPL-MCNC: 2.6 G/DL (ref 3.5–5.2)
ALBUMIN SERPL-MCNC: 2.7 G/DL (ref 3.5–5.2)
ALBUMIN SERPL-MCNC: 2.8 G/DL (ref 3.5–5.2)
ALBUMIN SERPL-MCNC: 3 G/DL (ref 3.5–5.2)
ALBUMIN SERPL-MCNC: 3 G/DL (ref 3.5–5.2)
ALBUMIN SERPL-MCNC: 3.1 G/DL (ref 3.5–5.2)
ALBUMIN SERPL-MCNC: 3.1 G/DL (ref 3.5–5.2)
ALBUMIN SERPL-MCNC: 3.2 G/DL (ref 3.5–5.2)
ALBUMIN SERPL-MCNC: 3.3 G/DL (ref 3.5–5.2)
ALBUMIN SERPL-MCNC: 3.4 G/DL (ref 3.5–5.2)
ALBUMIN SERPL-MCNC: 3.4 G/DL (ref 3.5–5.2)
ALBUMIN SERPL-MCNC: 3.5 G/DL (ref 3.5–5.2)
ALBUMIN SERPL-MCNC: 3.7 G/DL (ref 3.5–5.2)
ALBUMIN SERPL-MCNC: 3.9 G/DL (ref 3.5–5.2)
ALBUMIN SERPL-MCNC: 4 G/DL (ref 3.5–5.2)
ALBUMIN/GLOBULIN RATIO: ABNORMAL (ref 1–2.5)
ALP BLD-CCNC: 43 U/L (ref 35–104)
ALP BLD-CCNC: 44 U/L (ref 35–104)
ALP BLD-CCNC: 45 U/L (ref 35–104)
ALP BLD-CCNC: 45 U/L (ref 35–104)
ALP BLD-CCNC: 46 U/L (ref 35–104)
ALP BLD-CCNC: 46 U/L (ref 35–104)
ALP BLD-CCNC: 47 U/L (ref 35–104)
ALP BLD-CCNC: 49 U/L (ref 35–104)
ALP BLD-CCNC: 50 U/L (ref 35–104)
ALP BLD-CCNC: 51 U/L (ref 35–104)
ALP BLD-CCNC: 55 U/L (ref 35–104)
ALP BLD-CCNC: 64 U/L (ref 35–104)
ALP BLD-CCNC: 65 U/L (ref 35–104)
ALP BLD-CCNC: 72 U/L (ref 35–104)
ALP BLD-CCNC: 75 U/L (ref 35–104)
ALPHA 1 PERCENT: 4 % (ref 3–6)
ALPHA 2 PERCENT: 13 % (ref 6–13)
ALPHA-1-GLOBULIN: 0.2 G/DL (ref 0.1–0.4)
ALPHA-2-GLOBULIN: 0.8 G/DL (ref 0.5–0.9)
ALT SERPL-CCNC: 10 U/L (ref 5–33)
ALT SERPL-CCNC: 11 U/L (ref 5–33)
ALT SERPL-CCNC: 12 U/L (ref 5–33)
ALT SERPL-CCNC: 14 U/L (ref 5–33)
ALT SERPL-CCNC: 6 U/L (ref 5–33)
ALT SERPL-CCNC: 6 U/L (ref 5–33)
ALT SERPL-CCNC: 7 U/L (ref 5–33)
ALT SERPL-CCNC: 8 U/L (ref 5–33)
ALT SERPL-CCNC: 9 U/L (ref 5–33)
ALT SERPL-CCNC: 9 U/L (ref 5–33)
AMORPHOUS: ABNORMAL
ANION GAP SERPL CALCULATED.3IONS-SCNC: 10 MMOL/L (ref 9–17)
ANION GAP SERPL CALCULATED.3IONS-SCNC: 11 MMOL/L (ref 9–17)
ANION GAP SERPL CALCULATED.3IONS-SCNC: 12 MMOL/L (ref 9–17)
ANION GAP SERPL CALCULATED.3IONS-SCNC: 13 MMOL/L (ref 9–17)
ANION GAP SERPL CALCULATED.3IONS-SCNC: 14 MMOL/L (ref 9–17)
ANTI-XA UNFRAC HEPARIN: 0.13 IU/L (ref 0.3–0.7)
ANTI-XA UNFRAC HEPARIN: 0.25 IU/L (ref 0.3–0.7)
ANTI-XA UNFRAC HEPARIN: 0.37 IU/L (ref 0.3–0.7)
ANTI-XA UNFRAC HEPARIN: 0.37 IU/L (ref 0.3–0.7)
ANTI-XA UNFRAC HEPARIN: 0.52 IU/L (ref 0.3–0.7)
ANTI-XA UNFRAC HEPARIN: 0.63 IU/L (ref 0.3–0.7)
ANTI-XA UNFRAC HEPARIN: 0.71 IU/L (ref 0.3–0.7)
ANTI-XA UNFRAC HEPARIN: 1.1 IU/L (ref 0.3–0.7)
ANTI-XA UNFRAC HEPARIN: >1.1 IU/L (ref 0.3–0.7)
ANTIBODY SCREEN: NEGATIVE
ARM BAND NUMBER: NORMAL
AST SERPL-CCNC: 11 U/L
AST SERPL-CCNC: 12 U/L
AST SERPL-CCNC: 13 U/L
AST SERPL-CCNC: 14 U/L
AST SERPL-CCNC: 14 U/L
AST SERPL-CCNC: 15 U/L
AST SERPL-CCNC: 16 U/L
AST SERPL-CCNC: 16 U/L
AST SERPL-CCNC: 19 U/L
AST SERPL-CCNC: 20 U/L
AST SERPL-CCNC: 21 U/L
ATYPICAL LYMPHOCYTE ABSOLUTE COUNT: 0.08 K/UL
ATYPICAL LYMPHOCYTES: 1 %
BACTERIA: ABNORMAL
BANDS: 2 % (ref 0–10)
BASOPHILS # BLD: 0 % (ref 0–2)
BASOPHILS # BLD: 1 % (ref 0–2)
BASOPHILS ABSOLUTE: 0 K/UL (ref 0–0.2)
BASOPHILS ABSOLUTE: 0.04 K/UL (ref 0–0.2)
BASOPHILS ABSOLUTE: 0.04 K/UL (ref 0–0.2)
BASOPHILS ABSOLUTE: 0.05 K/UL (ref 0–0.2)
BASOPHILS ABSOLUTE: 0.05 K/UL (ref 0–0.2)
BASOPHILS ABSOLUTE: 0.06 K/UL (ref 0–0.2)
BASOPHILS ABSOLUTE: 0.06 K/UL (ref 0–0.2)
BASOPHILS ABSOLUTE: 0.07 K/UL (ref 0–0.2)
BASOPHILS ABSOLUTE: 0.08 K/UL (ref 0–0.2)
BASOPHILS ABSOLUTE: 0.1 K/UL (ref 0–0.2)
BETA GLOBULIN: 0.8 G/DL (ref 0.5–1.1)
BETA PERCENT: 13 % (ref 11–19)
BILIRUB SERPL-MCNC: 0.16 MG/DL (ref 0.3–1.2)
BILIRUB SERPL-MCNC: 0.16 MG/DL (ref 0.3–1.2)
BILIRUB SERPL-MCNC: 0.17 MG/DL (ref 0.3–1.2)
BILIRUB SERPL-MCNC: 0.17 MG/DL (ref 0.3–1.2)
BILIRUB SERPL-MCNC: 0.19 MG/DL (ref 0.3–1.2)
BILIRUB SERPL-MCNC: 0.19 MG/DL (ref 0.3–1.2)
BILIRUB SERPL-MCNC: 0.21 MG/DL (ref 0.3–1.2)
BILIRUB SERPL-MCNC: 0.22 MG/DL (ref 0.3–1.2)
BILIRUB SERPL-MCNC: 0.23 MG/DL (ref 0.3–1.2)
BILIRUB SERPL-MCNC: 0.24 MG/DL (ref 0.3–1.2)
BILIRUB SERPL-MCNC: 0.26 MG/DL (ref 0.3–1.2)
BILIRUB SERPL-MCNC: 0.27 MG/DL (ref 0.3–1.2)
BILIRUB SERPL-MCNC: 0.28 MG/DL (ref 0.3–1.2)
BILIRUB SERPL-MCNC: 0.29 MG/DL (ref 0.3–1.2)
BILIRUB SERPL-MCNC: <0.15 MG/DL (ref 0.3–1.2)
BILIRUBIN DIRECT: 0.08 MG/DL
BILIRUBIN URINE: ABNORMAL
BILIRUBIN URINE: NEGATIVE
BILIRUBIN URINE: NEGATIVE
BILIRUBIN, INDIRECT: 0.15 MG/DL (ref 0–1)
BLD PROD TYP BPU: NORMAL
BLOOD BANK COMMENT: NORMAL
BNP INTERPRETATION: ABNORMAL
BUN BLDV-MCNC: 10 MG/DL (ref 8–23)
BUN BLDV-MCNC: 10 MG/DL (ref 8–23)
BUN BLDV-MCNC: 11 MG/DL (ref 8–23)
BUN BLDV-MCNC: 11 MG/DL (ref 8–23)
BUN BLDV-MCNC: 12 MG/DL (ref 8–23)
BUN BLDV-MCNC: 12 MG/DL (ref 8–23)
BUN BLDV-MCNC: 13 MG/DL (ref 8–23)
BUN BLDV-MCNC: 13 MG/DL (ref 8–23)
BUN BLDV-MCNC: 14 MG/DL (ref 8–23)
BUN BLDV-MCNC: 14 MG/DL (ref 8–23)
BUN BLDV-MCNC: 15 MG/DL (ref 8–23)
BUN BLDV-MCNC: 17 MG/DL (ref 8–23)
BUN BLDV-MCNC: 18 MG/DL (ref 8–23)
BUN BLDV-MCNC: 19 MG/DL (ref 8–23)
BUN BLDV-MCNC: 19 MG/DL (ref 8–23)
BUN BLDV-MCNC: 20 MG/DL (ref 8–23)
BUN BLDV-MCNC: 20 MG/DL (ref 8–23)
BUN BLDV-MCNC: 22 MG/DL (ref 8–23)
BUN BLDV-MCNC: 30 MG/DL (ref 8–23)
BUN BLDV-MCNC: 30 MG/DL (ref 8–23)
BUN BLDV-MCNC: 6 MG/DL (ref 8–23)
BUN BLDV-MCNC: 7 MG/DL (ref 8–23)
BUN BLDV-MCNC: 9 MG/DL (ref 8–23)
BUN BLDV-MCNC: 9 MG/DL (ref 8–23)
BUN/CREAT BLD: ABNORMAL (ref 9–20)
CALCIUM SERPL-MCNC: 7.3 MG/DL (ref 8.6–10.4)
CALCIUM SERPL-MCNC: 7.4 MG/DL (ref 8.6–10.4)
CALCIUM SERPL-MCNC: 7.5 MG/DL (ref 8.6–10.4)
CALCIUM SERPL-MCNC: 7.6 MG/DL (ref 8.6–10.4)
CALCIUM SERPL-MCNC: 8 MG/DL (ref 8.6–10.4)
CALCIUM SERPL-MCNC: 8 MG/DL (ref 8.6–10.4)
CALCIUM SERPL-MCNC: 8.3 MG/DL (ref 8.6–10.4)
CALCIUM SERPL-MCNC: 8.6 MG/DL (ref 8.6–10.4)
CALCIUM SERPL-MCNC: 8.8 MG/DL (ref 8.6–10.4)
CALCIUM SERPL-MCNC: 8.9 MG/DL (ref 8.6–10.4)
CALCIUM SERPL-MCNC: 8.9 MG/DL (ref 8.6–10.4)
CALCIUM SERPL-MCNC: 9 MG/DL (ref 8.6–10.4)
CALCIUM SERPL-MCNC: 9.2 MG/DL (ref 8.6–10.4)
CALCIUM SERPL-MCNC: 9.2 MG/DL (ref 8.6–10.4)
CALCIUM SERPL-MCNC: 9.3 MG/DL (ref 8.6–10.4)
CALCIUM SERPL-MCNC: 9.4 MG/DL (ref 8.6–10.4)
CALCIUM SERPL-MCNC: 9.8 MG/DL (ref 8.6–10.4)
CASTS UA: ABNORMAL /LPF
CHLORIDE BLD-SCNC: 102 MMOL/L (ref 98–107)
CHLORIDE BLD-SCNC: 105 MMOL/L (ref 98–107)
CHLORIDE BLD-SCNC: 106 MMOL/L (ref 98–107)
CHLORIDE BLD-SCNC: 107 MMOL/L (ref 98–107)
CHLORIDE BLD-SCNC: 107 MMOL/L (ref 98–107)
CHLORIDE BLD-SCNC: 108 MMOL/L (ref 98–107)
CHLORIDE BLD-SCNC: 109 MMOL/L (ref 98–107)
CHLORIDE BLD-SCNC: 110 MMOL/L (ref 98–107)
CHLORIDE BLD-SCNC: 110 MMOL/L (ref 98–107)
CHLORIDE BLD-SCNC: 113 MMOL/L (ref 98–107)
CHLORIDE BLD-SCNC: 113 MMOL/L (ref 98–107)
CHLORIDE BLD-SCNC: 114 MMOL/L (ref 98–107)
CHLORIDE BLD-SCNC: 114 MMOL/L (ref 98–107)
CHLORIDE BLD-SCNC: 116 MMOL/L (ref 98–107)
CHLORIDE BLD-SCNC: 117 MMOL/L (ref 98–107)
CHLORIDE BLD-SCNC: 120 MMOL/L (ref 98–107)
CHLORIDE BLD-SCNC: 120 MMOL/L (ref 98–107)
CHOLESTEROL/HDL RATIO: 5.5
CHOLESTEROL: 188 MG/DL
CO2: 14 MMOL/L (ref 20–31)
CO2: 17 MMOL/L (ref 20–31)
CO2: 19 MMOL/L (ref 20–31)
CO2: 19 MMOL/L (ref 20–31)
CO2: 20 MMOL/L (ref 20–31)
CO2: 21 MMOL/L (ref 20–31)
CO2: 22 MMOL/L (ref 20–31)
CO2: 23 MMOL/L (ref 20–31)
CO2: 24 MMOL/L (ref 20–31)
CO2: 26 MMOL/L (ref 20–31)
CO2: 26 MMOL/L (ref 20–31)
COLOR: YELLOW
COMMENT UA: ABNORMAL
CREAT SERPL-MCNC: 0.48 MG/DL (ref 0.5–0.9)
CREAT SERPL-MCNC: 0.55 MG/DL (ref 0.5–0.9)
CREAT SERPL-MCNC: 0.6 MG/DL (ref 0.5–0.9)
CREAT SERPL-MCNC: 0.62 MG/DL (ref 0.5–0.9)
CREAT SERPL-MCNC: 0.64 MG/DL (ref 0.5–0.9)
CREAT SERPL-MCNC: 0.67 MG/DL (ref 0.5–0.9)
CREAT SERPL-MCNC: 0.7 MG/DL (ref 0.5–0.9)
CREAT SERPL-MCNC: 0.7 MG/DL (ref 0.5–0.9)
CREAT SERPL-MCNC: 0.75 MG/DL (ref 0.5–0.9)
CREAT SERPL-MCNC: 0.75 MG/DL (ref 0.5–0.9)
CREAT SERPL-MCNC: 0.85 MG/DL (ref 0.5–0.9)
CREAT SERPL-MCNC: 0.87 MG/DL (ref 0.5–0.9)
CREAT SERPL-MCNC: 0.87 MG/DL (ref 0.5–0.9)
CREAT SERPL-MCNC: 0.91 MG/DL (ref 0.5–0.9)
CREAT SERPL-MCNC: 0.95 MG/DL (ref 0.5–0.9)
CREAT SERPL-MCNC: 0.96 MG/DL (ref 0.5–0.9)
CREAT SERPL-MCNC: 1.02 MG/DL (ref 0.5–0.9)
CREAT SERPL-MCNC: 1.05 MG/DL (ref 0.5–0.9)
CREAT SERPL-MCNC: 1.1 MG/DL (ref 0.5–0.9)
CREAT SERPL-MCNC: 1.23 MG/DL (ref 0.5–0.9)
CREAT SERPL-MCNC: 1.25 MG/DL (ref 0.5–0.9)
CREAT SERPL-MCNC: 1.28 MG/DL (ref 0.5–0.9)
CREAT SERPL-MCNC: 1.31 MG/DL (ref 0.5–0.9)
CREAT SERPL-MCNC: 1.44 MG/DL (ref 0.5–0.9)
CROSSMATCH RESULT: NORMAL
CRYSTALS, UA: ABNORMAL /HPF
CULTURE: ABNORMAL
CULTURE: NO GROWTH
DIFFERENTIAL TYPE: ABNORMAL
DISPENSE STATUS BLOOD BANK: NORMAL
EKG ATRIAL RATE: 61 BPM
EKG ATRIAL RATE: 69 BPM
EKG ATRIAL RATE: 69 BPM
EKG ATRIAL RATE: 70 BPM
EKG P AXIS: 53 DEGREES
EKG P AXIS: 58 DEGREES
EKG P AXIS: 64 DEGREES
EKG P AXIS: 65 DEGREES
EKG P-R INTERVAL: 186 MS
EKG P-R INTERVAL: 188 MS
EKG P-R INTERVAL: 198 MS
EKG P-R INTERVAL: 204 MS
EKG Q-T INTERVAL: 400 MS
EKG Q-T INTERVAL: 406 MS
EKG Q-T INTERVAL: 416 MS
EKG Q-T INTERVAL: 434 MS
EKG QRS DURATION: 64 MS
EKG QRS DURATION: 66 MS
EKG QRS DURATION: 70 MS
EKG QRS DURATION: 78 MS
EKG QTC CALCULATION (BAZETT): 428 MS
EKG QTC CALCULATION (BAZETT): 436 MS
EKG QTC CALCULATION (BAZETT): 438 MS
EKG QTC CALCULATION (BAZETT): 445 MS
EKG R AXIS: -12 DEGREES
EKG R AXIS: -6 DEGREES
EKG R AXIS: -7 DEGREES
EKG R AXIS: 3 DEGREES
EKG T AXIS: 20 DEGREES
EKG T AXIS: 32 DEGREES
EKG T AXIS: 57 DEGREES
EKG T AXIS: 83 DEGREES
EKG VENTRICULAR RATE: 61 BPM
EKG VENTRICULAR RATE: 69 BPM
EKG VENTRICULAR RATE: 69 BPM
EKG VENTRICULAR RATE: 70 BPM
EOSINOPHILS RELATIVE PERCENT: 0 % (ref 0–4)
EOSINOPHILS RELATIVE PERCENT: 1 % (ref 0–4)
EOSINOPHILS RELATIVE PERCENT: 2 % (ref 0–4)
EOSINOPHILS RELATIVE PERCENT: 4 % (ref 0–4)
EOSINOPHILS RELATIVE PERCENT: 5 % (ref 0–4)
EOSINOPHILS RELATIVE PERCENT: 6 % (ref 0–4)
EPITHELIAL CELLS UA: ABNORMAL /HPF
ESTIMATED AVERAGE GLUCOSE: 100 MG/DL
EXPIRATION DATE: NORMAL
FERRITIN: 42 UG/L (ref 13–150)
FERRITIN: 50 UG/L (ref 13–150)
FOLATE: 4.4 NG/ML
FOLATE: 5.2 NG/ML
FOLATE: 6 NG/ML
GAMMA GLOBULIN %: 15 % (ref 9–20)
GAMMA GLOBULIN: 0.9 G/DL (ref 0.5–1.5)
GFR AFRICAN AMERICAN: 43 ML/MIN
GFR AFRICAN AMERICAN: 47 ML/MIN
GFR AFRICAN AMERICAN: 49 ML/MIN
GFR AFRICAN AMERICAN: 50 ML/MIN
GFR AFRICAN AMERICAN: 51 ML/MIN
GFR AFRICAN AMERICAN: 58 ML/MIN
GFR AFRICAN AMERICAN: >60 ML/MIN
GFR NON-AFRICAN AMERICAN: 35 ML/MIN
GFR NON-AFRICAN AMERICAN: 39 ML/MIN
GFR NON-AFRICAN AMERICAN: 40 ML/MIN
GFR NON-AFRICAN AMERICAN: 41 ML/MIN
GFR NON-AFRICAN AMERICAN: 42 ML/MIN
GFR NON-AFRICAN AMERICAN: 48 ML/MIN
GFR NON-AFRICAN AMERICAN: 51 ML/MIN
GFR NON-AFRICAN AMERICAN: 52 ML/MIN
GFR NON-AFRICAN AMERICAN: 56 ML/MIN
GFR NON-AFRICAN AMERICAN: 57 ML/MIN
GFR NON-AFRICAN AMERICAN: 60 ML/MIN
GFR NON-AFRICAN AMERICAN: >60 ML/MIN
GFR SERPL CREATININE-BSD FRML MDRD: ABNORMAL ML/MIN/{1.73_M2}
GLOBULIN: ABNORMAL G/DL (ref 1.5–3.8)
GLUCOSE BLD-MCNC: 100 MG/DL (ref 70–99)
GLUCOSE BLD-MCNC: 104 MG/DL (ref 70–99)
GLUCOSE BLD-MCNC: 105 MG/DL (ref 70–99)
GLUCOSE BLD-MCNC: 105 MG/DL (ref 70–99)
GLUCOSE BLD-MCNC: 109 MG/DL (ref 70–99)
GLUCOSE BLD-MCNC: 111 MG/DL (ref 65–105)
GLUCOSE BLD-MCNC: 114 MG/DL (ref 65–105)
GLUCOSE BLD-MCNC: 140 MG/DL (ref 70–99)
GLUCOSE BLD-MCNC: 145 MG/DL (ref 70–99)
GLUCOSE BLD-MCNC: 62 MG/DL (ref 70–99)
GLUCOSE BLD-MCNC: 64 MG/DL (ref 70–99)
GLUCOSE BLD-MCNC: 70 MG/DL (ref 70–99)
GLUCOSE BLD-MCNC: 82 MG/DL (ref 70–99)
GLUCOSE BLD-MCNC: 83 MG/DL (ref 70–99)
GLUCOSE BLD-MCNC: 85 MG/DL (ref 70–99)
GLUCOSE BLD-MCNC: 86 MG/DL (ref 70–99)
GLUCOSE BLD-MCNC: 87 MG/DL (ref 65–105)
GLUCOSE BLD-MCNC: 87 MG/DL (ref 70–99)
GLUCOSE BLD-MCNC: 88 MG/DL (ref 70–99)
GLUCOSE BLD-MCNC: 89 MG/DL (ref 65–105)
GLUCOSE BLD-MCNC: 89 MG/DL (ref 70–99)
GLUCOSE BLD-MCNC: 89 MG/DL (ref 70–99)
GLUCOSE BLD-MCNC: 90 MG/DL (ref 65–105)
GLUCOSE BLD-MCNC: 90 MG/DL (ref 70–99)
GLUCOSE BLD-MCNC: 93 MG/DL (ref 70–99)
GLUCOSE BLD-MCNC: 94 MG/DL (ref 70–99)
GLUCOSE BLD-MCNC: 95 MG/DL (ref 70–99)
GLUCOSE BLD-MCNC: 95 MG/DL (ref 70–99)
GLUCOSE BLD-MCNC: 98 MG/DL (ref 70–99)
GLUCOSE URINE: NEGATIVE
HAPTOGLOBIN: 152 MG/DL (ref 30–200)
HBA1C MFR BLD: 5.1 % (ref 4–6)
HCT VFR BLD CALC: 20.4 % (ref 36–46)
HCT VFR BLD CALC: 21.3 % (ref 36–46)
HCT VFR BLD CALC: 22.2 % (ref 36–46)
HCT VFR BLD CALC: 23 % (ref 36–46)
HCT VFR BLD CALC: 23.1 % (ref 36–46)
HCT VFR BLD CALC: 23.4 % (ref 36–46)
HCT VFR BLD CALC: 23.5 % (ref 36–46)
HCT VFR BLD CALC: 24.4 % (ref 36–46)
HCT VFR BLD CALC: 26 % (ref 36–46)
HCT VFR BLD CALC: 26.2 % (ref 36–46)
HCT VFR BLD CALC: 26.3 % (ref 36–46)
HCT VFR BLD CALC: 26.5 % (ref 36–46)
HCT VFR BLD CALC: 26.6 % (ref 36–46)
HCT VFR BLD CALC: 26.6 % (ref 36–46)
HCT VFR BLD CALC: 27 % (ref 36–46)
HCT VFR BLD CALC: 27.2 % (ref 36–46)
HCT VFR BLD CALC: 27.3 % (ref 36–46)
HCT VFR BLD CALC: 27.4 % (ref 36–46)
HCT VFR BLD CALC: 28.7 % (ref 36–46)
HCT VFR BLD CALC: 28.8 % (ref 36–46)
HCT VFR BLD CALC: 28.8 % (ref 36–46)
HCT VFR BLD CALC: 29.3 % (ref 36–46)
HCT VFR BLD CALC: 29.8 % (ref 36–46)
HCT VFR BLD CALC: 30.3 % (ref 36–46)
HCT VFR BLD CALC: 30.6 % (ref 36–46)
HCT VFR BLD CALC: 31.2 % (ref 36–46)
HCT VFR BLD CALC: 32 % (ref 36–46)
HCT VFR BLD CALC: 32.1 % (ref 36–46)
HCT VFR BLD CALC: 32.2 % (ref 36–46)
HCT VFR BLD CALC: 32.8 % (ref 36–46)
HCT VFR BLD CALC: 32.8 % (ref 36–46)
HCT VFR BLD CALC: 33 % (ref 36–46)
HCT VFR BLD CALC: 33.3 % (ref 36–46)
HCT VFR BLD CALC: 35.2 % (ref 36–46)
HCT VFR BLD CALC: 35.5 % (ref 36–46)
HCT VFR BLD CALC: 36.5 % (ref 36–46)
HCT VFR BLD CALC: 36.9 % (ref 36–46)
HCT VFR BLD CALC: 36.9 % (ref 36–46)
HCT VFR BLD CALC: 37.8 % (ref 36–46)
HCT VFR BLD CALC: 37.9 % (ref 36–46)
HCT VFR BLD CALC: 38.2 % (ref 36–46)
HCT VFR BLD CALC: 38.5 % (ref 36–46)
HCT VFR BLD CALC: 38.6 % (ref 36–46)
HCT VFR BLD CALC: 39.6 % (ref 36–46)
HDLC SERPL-MCNC: 34 MG/DL
HEMOGLOBIN: 10 G/DL (ref 12–16)
HEMOGLOBIN: 10 G/DL (ref 12–16)
HEMOGLOBIN: 10.4 G/DL (ref 12–16)
HEMOGLOBIN: 10.4 G/DL (ref 12–16)
HEMOGLOBIN: 10.5 G/DL (ref 12–16)
HEMOGLOBIN: 10.6 G/DL (ref 12–16)
HEMOGLOBIN: 10.6 G/DL (ref 12–16)
HEMOGLOBIN: 10.8 G/DL (ref 12–16)
HEMOGLOBIN: 11.1 G/DL (ref 12–16)
HEMOGLOBIN: 11.2 G/DL (ref 12–16)
HEMOGLOBIN: 11.7 G/DL (ref 12–16)
HEMOGLOBIN: 11.7 G/DL (ref 12–16)
HEMOGLOBIN: 11.8 G/DL (ref 12–16)
HEMOGLOBIN: 11.8 G/DL (ref 12–16)
HEMOGLOBIN: 12 G/DL (ref 12–16)
HEMOGLOBIN: 12.1 G/DL (ref 12–16)
HEMOGLOBIN: 12.1 G/DL (ref 12–16)
HEMOGLOBIN: 12.2 G/DL (ref 12–16)
HEMOGLOBIN: 12.8 G/DL (ref 12–16)
HEMOGLOBIN: 6.2 G/DL (ref 12–16)
HEMOGLOBIN: 6.7 G/DL (ref 12–16)
HEMOGLOBIN: 6.8 G/DL (ref 12–16)
HEMOGLOBIN: 7.1 G/DL (ref 12–16)
HEMOGLOBIN: 7.2 G/DL (ref 12–16)
HEMOGLOBIN: 7.3 G/DL (ref 12–16)
HEMOGLOBIN: 7.3 G/DL (ref 12–16)
HEMOGLOBIN: 7.7 G/DL (ref 12–16)
HEMOGLOBIN: 8.3 G/DL (ref 12–16)
HEMOGLOBIN: 8.4 G/DL (ref 12–16)
HEMOGLOBIN: 8.6 G/DL (ref 12–16)
HEMOGLOBIN: 8.7 G/DL (ref 12–16)
HEMOGLOBIN: 8.8 G/DL (ref 12–16)
HEMOGLOBIN: 9 G/DL (ref 12–16)
HEMOGLOBIN: 9.3 G/DL (ref 12–16)
HEMOGLOBIN: 9.3 G/DL (ref 12–16)
HEMOGLOBIN: 9.6 G/DL (ref 12–16)
HEMOGLOBIN: 9.9 G/DL (ref 12–16)
IMMATURE GRANULOCYTES: ABNORMAL %
IMMATURE RETIC FRACT: ABNORMAL %
IMMATURE RETIC FRACT: ABNORMAL %
INR BLD: 1
INR BLD: 1.1
INR BLD: 1.6
IRON SATURATION: 10 % (ref 20–55)
IRON SATURATION: 11 % (ref 20–55)
IRON: 24 UG/DL (ref 37–145)
IRON: 26 UG/DL (ref 37–145)
KETONES, URINE: ABNORMAL
KETONES, URINE: NEGATIVE
KETONES, URINE: NEGATIVE
LACTATE DEHYDROGENASE: 176 U/L (ref 135–214)
LDL CHOLESTEROL: 137 MG/DL (ref 0–130)
LEUKOCYTE ESTERASE, URINE: ABNORMAL
LEUKOCYTE ESTERASE, URINE: NEGATIVE
LEUKOCYTE ESTERASE, URINE: NEGATIVE
LIPASE: 33 U/L (ref 13–60)
LIPASE: 34 U/L (ref 13–60)
LV EF: 55 %
LVEF MODALITY: NORMAL
LYMPHOCYTES # BLD: 11 % (ref 24–44)
LYMPHOCYTES # BLD: 11 % (ref 24–44)
LYMPHOCYTES # BLD: 12 % (ref 24–44)
LYMPHOCYTES # BLD: 13 % (ref 24–44)
LYMPHOCYTES # BLD: 15 % (ref 24–44)
LYMPHOCYTES # BLD: 16 % (ref 24–44)
LYMPHOCYTES # BLD: 16 % (ref 24–44)
LYMPHOCYTES # BLD: 17 % (ref 24–44)
LYMPHOCYTES # BLD: 19 % (ref 24–44)
LYMPHOCYTES # BLD: 21 % (ref 24–44)
LYMPHOCYTES # BLD: 22 % (ref 24–44)
LYMPHOCYTES # BLD: 23 % (ref 24–44)
LYMPHOCYTES # BLD: 24 % (ref 24–44)
LYMPHOCYTES # BLD: 27 % (ref 24–44)
LYMPHOCYTES # BLD: 28 % (ref 24–44)
LYMPHOCYTES # BLD: 28 % (ref 24–44)
LYMPHOCYTES # BLD: 30 % (ref 24–44)
LYMPHOCYTES # BLD: 33 % (ref 24–44)
LYMPHOCYTES # BLD: 8 % (ref 24–44)
LYMPHOCYTES # BLD: 9 % (ref 24–44)
Lab: ABNORMAL
Lab: NORMAL
MAGNESIUM: 1.3 MG/DL (ref 1.6–2.6)
MAGNESIUM: 1.5 MG/DL (ref 1.6–2.6)
MAGNESIUM: 1.8 MG/DL (ref 1.6–2.6)
MAGNESIUM: 2.1 MG/DL (ref 1.6–2.6)
MCH RBC QN AUTO: 21.9 PG (ref 26–34)
MCH RBC QN AUTO: 22 PG (ref 26–34)
MCH RBC QN AUTO: 22.2 PG (ref 26–34)
MCH RBC QN AUTO: 22.3 PG (ref 26–34)
MCH RBC QN AUTO: 22.4 PG (ref 26–34)
MCH RBC QN AUTO: 22.4 PG (ref 26–34)
MCH RBC QN AUTO: 22.6 PG (ref 26–34)
MCH RBC QN AUTO: 22.6 PG (ref 26–34)
MCH RBC QN AUTO: 22.9 PG (ref 26–34)
MCH RBC QN AUTO: 22.9 PG (ref 26–34)
MCH RBC QN AUTO: 23.1 PG (ref 26–34)
MCH RBC QN AUTO: 24.1 PG (ref 26–34)
MCH RBC QN AUTO: 24.9 PG (ref 26–34)
MCH RBC QN AUTO: 25.1 PG (ref 26–34)
MCH RBC QN AUTO: 25.2 PG (ref 26–34)
MCH RBC QN AUTO: 25.3 PG (ref 26–34)
MCH RBC QN AUTO: 25.4 PG (ref 26–34)
MCH RBC QN AUTO: 26.3 PG (ref 26–34)
MCH RBC QN AUTO: 26.3 PG (ref 26–34)
MCH RBC QN AUTO: 26.5 PG (ref 26–34)
MCH RBC QN AUTO: 26.6 PG (ref 26–34)
MCH RBC QN AUTO: 26.8 PG (ref 26–34)
MCHC RBC AUTO-ENTMCNC: 30.2 G/DL (ref 31–37)
MCHC RBC AUTO-ENTMCNC: 30.5 G/DL (ref 31–37)
MCHC RBC AUTO-ENTMCNC: 30.6 G/DL (ref 31–37)
MCHC RBC AUTO-ENTMCNC: 31.2 G/DL (ref 31–37)
MCHC RBC AUTO-ENTMCNC: 31.2 G/DL (ref 31–37)
MCHC RBC AUTO-ENTMCNC: 31.3 G/DL (ref 31–37)
MCHC RBC AUTO-ENTMCNC: 31.4 G/DL (ref 31–37)
MCHC RBC AUTO-ENTMCNC: 31.5 G/DL (ref 31–37)
MCHC RBC AUTO-ENTMCNC: 31.6 G/DL (ref 31–37)
MCHC RBC AUTO-ENTMCNC: 31.7 G/DL (ref 31–37)
MCHC RBC AUTO-ENTMCNC: 31.8 G/DL (ref 31–37)
MCHC RBC AUTO-ENTMCNC: 32 G/DL (ref 31–37)
MCHC RBC AUTO-ENTMCNC: 32.1 G/DL (ref 31–37)
MCHC RBC AUTO-ENTMCNC: 32.1 G/DL (ref 31–37)
MCHC RBC AUTO-ENTMCNC: 32.2 G/DL (ref 31–37)
MCHC RBC AUTO-ENTMCNC: 32.3 G/DL (ref 31–37)
MCHC RBC AUTO-ENTMCNC: 32.3 G/DL (ref 31–37)
MCV RBC AUTO: 69.2 FL (ref 80–100)
MCV RBC AUTO: 69.5 FL (ref 80–100)
MCV RBC AUTO: 69.7 FL (ref 80–100)
MCV RBC AUTO: 69.7 FL (ref 80–100)
MCV RBC AUTO: 70.2 FL (ref 80–100)
MCV RBC AUTO: 70.3 FL (ref 80–100)
MCV RBC AUTO: 70.9 FL (ref 80–100)
MCV RBC AUTO: 71.4 FL (ref 80–100)
MCV RBC AUTO: 71.6 FL (ref 80–100)
MCV RBC AUTO: 71.8 FL (ref 80–100)
MCV RBC AUTO: 71.9 FL (ref 80–100)
MCV RBC AUTO: 72.6 FL (ref 80–100)
MCV RBC AUTO: 72.9 FL (ref 80–100)
MCV RBC AUTO: 72.9 FL (ref 80–100)
MCV RBC AUTO: 76 FL (ref 80–100)
MCV RBC AUTO: 79.4 FL (ref 80–100)
MCV RBC AUTO: 79.9 FL (ref 80–100)
MCV RBC AUTO: 80.4 FL (ref 80–100)
MCV RBC AUTO: 80.5 FL (ref 80–100)
MCV RBC AUTO: 82.2 FL (ref 80–100)
MCV RBC AUTO: 82.5 FL (ref 80–100)
MCV RBC AUTO: 82.6 FL (ref 80–100)
MCV RBC AUTO: 82.8 FL (ref 80–100)
MCV RBC AUTO: 83 FL (ref 80–100)
MCV RBC AUTO: 83.3 FL (ref 80–100)
MONOCYTES # BLD: 1 % (ref 1–7)
MONOCYTES # BLD: 10 % (ref 1–7)
MONOCYTES # BLD: 10 % (ref 1–7)
MONOCYTES # BLD: 11 % (ref 1–7)
MONOCYTES # BLD: 11 % (ref 1–7)
MONOCYTES # BLD: 12 % (ref 1–7)
MONOCYTES # BLD: 2 % (ref 1–7)
MONOCYTES # BLD: 5 % (ref 1–7)
MONOCYTES # BLD: 5 % (ref 1–7)
MONOCYTES # BLD: 6 % (ref 1–7)
MONOCYTES # BLD: 7 % (ref 1–7)
MONOCYTES # BLD: 8 % (ref 1–7)
MONOCYTES # BLD: 9 % (ref 1–7)
MORPHOLOGY: ABNORMAL
MUCUS: ABNORMAL
MYOGLOBIN: 28 NG/ML (ref 25–58)
MYOGLOBIN: 29 NG/ML (ref 25–58)
NITRITE, URINE: NEGATIVE
NRBC AUTOMATED: ABNORMAL PER 100 WBC
NUCLEATED RED BLOOD CELLS: 1 PER 100 WBC
NUCLEATED RED BLOOD CELLS: 1 PER 100 WBC
OTHER OBSERVATIONS UA: ABNORMAL
PARTIAL THROMBOPLASTIN TIME: 37.5 SEC (ref 24–36)
PARTIAL THROMBOPLASTIN TIME: 39.5 SEC (ref 24–36)
PARTIAL THROMBOPLASTIN TIME: 40.5 SEC (ref 24–36)
PATHOLOGIST REVIEW: NORMAL
PATHOLOGIST: ABNORMAL
PDW BLD-RTO: 19.1 % (ref 11.5–14.9)
PDW BLD-RTO: 19.2 % (ref 11.5–14.9)
PDW BLD-RTO: 19.3 % (ref 11.5–14.9)
PDW BLD-RTO: 19.3 % (ref 11.5–14.9)
PDW BLD-RTO: 19.4 % (ref 11.5–14.9)
PDW BLD-RTO: 19.4 % (ref 11.5–14.9)
PDW BLD-RTO: 19.5 % (ref 11.5–14.9)
PDW BLD-RTO: 19.6 % (ref 11.5–14.9)
PDW BLD-RTO: 19.6 % (ref 11.5–14.9)
PDW BLD-RTO: 19.7 % (ref 11.5–14.9)
PDW BLD-RTO: 19.7 % (ref 11.5–14.9)
PDW BLD-RTO: 19.8 % (ref 11.5–14.9)
PDW BLD-RTO: 20 % (ref 11.5–14.9)
PDW BLD-RTO: 20.1 % (ref 11.5–14.9)
PDW BLD-RTO: 21.1 % (ref 11.5–14.9)
PDW BLD-RTO: 21.5 % (ref 11.5–14.9)
PDW BLD-RTO: 22 % (ref 11.5–14.9)
PDW BLD-RTO: 22.5 % (ref 11.5–14.9)
PDW BLD-RTO: 22.5 % (ref 11.5–14.9)
PDW BLD-RTO: 22.6 % (ref 11.5–14.9)
PDW BLD-RTO: 23 % (ref 11.5–14.9)
PH UA: 5 (ref 5–8)
PH UA: 5.5 (ref 5–8)
PH UA: 7.5 (ref 5–8)
PLATELET # BLD: 194 K/UL (ref 150–450)
PLATELET # BLD: 194 K/UL (ref 150–450)
PLATELET # BLD: 198 K/UL (ref 150–450)
PLATELET # BLD: 198 K/UL (ref 150–450)
PLATELET # BLD: 201 K/UL (ref 150–450)
PLATELET # BLD: 202 K/UL (ref 150–450)
PLATELET # BLD: 203 K/UL (ref 150–450)
PLATELET # BLD: 203 K/UL (ref 150–450)
PLATELET # BLD: 205 K/UL (ref 150–450)
PLATELET # BLD: 209 K/UL (ref 150–450)
PLATELET # BLD: 212 K/UL (ref 150–450)
PLATELET # BLD: 214 K/UL (ref 150–450)
PLATELET # BLD: 216 K/UL (ref 150–450)
PLATELET # BLD: 216 K/UL (ref 150–450)
PLATELET # BLD: 220 K/UL (ref 150–450)
PLATELET # BLD: 231 K/UL (ref 150–450)
PLATELET # BLD: 237 K/UL (ref 150–450)
PLATELET # BLD: 240 K/UL (ref 150–450)
PLATELET # BLD: 242 K/UL (ref 150–450)
PLATELET # BLD: 248 K/UL (ref 150–450)
PLATELET # BLD: 252 K/UL (ref 150–450)
PLATELET # BLD: 265 K/UL (ref 150–450)
PLATELET # BLD: 299 K/UL (ref 150–450)
PLATELET # BLD: 311 K/UL (ref 150–450)
PLATELET # BLD: 342 K/UL (ref 150–450)
PLATELET ESTIMATE: ABNORMAL
PMV BLD AUTO: 7.9 FL (ref 6–12)
PMV BLD AUTO: 8 FL (ref 6–12)
PMV BLD AUTO: 8.1 FL (ref 6–12)
PMV BLD AUTO: 8.2 FL (ref 6–12)
PMV BLD AUTO: 8.2 FL (ref 6–12)
PMV BLD AUTO: 8.3 FL (ref 6–12)
PMV BLD AUTO: 8.4 FL (ref 6–12)
PMV BLD AUTO: 8.5 FL (ref 6–12)
PMV BLD AUTO: 8.6 FL (ref 6–12)
PMV BLD AUTO: 8.7 FL (ref 6–12)
PMV BLD AUTO: 8.8 FL (ref 6–12)
PMV BLD AUTO: 8.9 FL (ref 6–12)
PMV BLD AUTO: 8.9 FL (ref 6–12)
PMV BLD AUTO: 9 FL (ref 6–12)
PMV BLD AUTO: 9.1 FL (ref 6–12)
POTASSIUM SERPL-SCNC: 3.4 MMOL/L (ref 3.7–5.3)
POTASSIUM SERPL-SCNC: 3.5 MMOL/L (ref 3.7–5.3)
POTASSIUM SERPL-SCNC: 3.6 MMOL/L (ref 3.7–5.3)
POTASSIUM SERPL-SCNC: 3.6 MMOL/L (ref 3.7–5.3)
POTASSIUM SERPL-SCNC: 3.7 MMOL/L (ref 3.7–5.3)
POTASSIUM SERPL-SCNC: 3.8 MMOL/L (ref 3.7–5.3)
POTASSIUM SERPL-SCNC: 3.9 MMOL/L (ref 3.7–5.3)
POTASSIUM SERPL-SCNC: 4.1 MMOL/L (ref 3.7–5.3)
POTASSIUM SERPL-SCNC: 4.2 MMOL/L (ref 3.7–5.3)
POTASSIUM SERPL-SCNC: 4.2 MMOL/L (ref 3.7–5.3)
POTASSIUM SERPL-SCNC: 4.3 MMOL/L (ref 3.7–5.3)
POTASSIUM SERPL-SCNC: 4.4 MMOL/L (ref 3.7–5.3)
POTASSIUM SERPL-SCNC: 4.4 MMOL/L (ref 3.7–5.3)
POTASSIUM SERPL-SCNC: 4.9 MMOL/L (ref 3.7–5.3)
POTASSIUM SERPL-SCNC: 5.1 MMOL/L (ref 3.7–5.3)
PRO-BNP: 631 PG/ML
PROTEIN ELECTROPHORESIS, SERUM: ABNORMAL
PROTEIN UA: ABNORMAL
PROTHROMBIN TIME: 12.7 SEC (ref 11.8–14.6)
PROTHROMBIN TIME: 14 SEC (ref 11.8–14.6)
PROTHROMBIN TIME: 18.8 SEC (ref 11.8–14.6)
RBC # BLD: 2.8 M/UL (ref 4–5.2)
RBC # BLD: 2.84 M/UL (ref 4–5.2)
RBC # BLD: 2.92 M/UL (ref 4–5.2)
RBC # BLD: 3.16 M/UL (ref 4–5.2)
RBC # BLD: 3.66 M/UL (ref 4–5.2)
RBC # BLD: 3.69 M/UL (ref 4–5.2)
RBC # BLD: 3.71 M/UL (ref 4–5.2)
RBC # BLD: 3.73 M/UL (ref 4–5.2)
RBC # BLD: 3.76 M/UL (ref 4–5.2)
RBC # BLD: 3.79 M/UL (ref 4–5.2)
RBC # BLD: 3.81 M/UL (ref 4–5.2)
RBC # BLD: 3.82 M/UL (ref 4–5.2)
RBC # BLD: 3.87 M/UL (ref 4–5.2)
RBC # BLD: 4.01 M/UL (ref 4–5.2)
RBC # BLD: 4.14 M/UL (ref 4–5.2)
RBC # BLD: 4.22 M/UL (ref 4–5.2)
RBC # BLD: 4.62 M/UL (ref 4–5.2)
RBC # BLD: 4.81 M/UL (ref 4–5.2)
RBC # BLD: 4.98 M/UL (ref 4–5.2)
RBC # BLD: 5.25 M/UL (ref 4–5.2)
RBC # BLD: 5.29 M/UL (ref 4–5.2)
RBC # BLD: 5.38 M/UL (ref 4–5.2)
RBC # BLD: 5.49 M/UL (ref 4–5.2)
RBC # BLD: 5.53 M/UL (ref 4–5.2)
RBC # BLD: 5.73 M/UL (ref 4–5.2)
RBC # BLD: ABNORMAL 10*6/UL
RBC UA: ABNORMAL /HPF
REASON FOR REJECTION: NORMAL
RENAL EPITHELIAL, UA: ABNORMAL /HPF
RETIC %: 2.6 % (ref 0.5–2)
RETIC %: 2.7 % (ref 0.5–2)
RETIC HEMOGLOBIN: ABNORMAL PG (ref 28.2–35.7)
RETIC HEMOGLOBIN: ABNORMAL PG (ref 28.2–35.7)
SEG NEUTROPHILS: 53 % (ref 36–66)
SEG NEUTROPHILS: 55 % (ref 36–66)
SEG NEUTROPHILS: 57 % (ref 36–66)
SEG NEUTROPHILS: 58 % (ref 36–66)
SEG NEUTROPHILS: 58 % (ref 36–66)
SEG NEUTROPHILS: 60 % (ref 36–66)
SEG NEUTROPHILS: 61 % (ref 36–66)
SEG NEUTROPHILS: 61 % (ref 36–66)
SEG NEUTROPHILS: 63 % (ref 36–66)
SEG NEUTROPHILS: 63 % (ref 36–66)
SEG NEUTROPHILS: 64 % (ref 36–66)
SEG NEUTROPHILS: 67 % (ref 36–66)
SEG NEUTROPHILS: 68 % (ref 36–66)
SEG NEUTROPHILS: 69 % (ref 36–66)
SEG NEUTROPHILS: 70 % (ref 36–66)
SEG NEUTROPHILS: 71 % (ref 36–66)
SEG NEUTROPHILS: 73 % (ref 36–66)
SEG NEUTROPHILS: 75 % (ref 36–66)
SEG NEUTROPHILS: 75 % (ref 36–66)
SEG NEUTROPHILS: 77 % (ref 36–66)
SEG NEUTROPHILS: 79 % (ref 36–66)
SEG NEUTROPHILS: 84 % (ref 36–66)
SEG NEUTROPHILS: 91 % (ref 36–66)
SEGMENTED NEUTROPHILS ABSOLUTE COUNT: 2.09 K/UL (ref 1.3–9.1)
SEGMENTED NEUTROPHILS ABSOLUTE COUNT: 2.34 K/UL (ref 1.3–9.1)
SEGMENTED NEUTROPHILS ABSOLUTE COUNT: 2.6 K/UL (ref 1.3–9.1)
SEGMENTED NEUTROPHILS ABSOLUTE COUNT: 2.67 K/UL (ref 1.3–9.1)
SEGMENTED NEUTROPHILS ABSOLUTE COUNT: 2.8 K/UL (ref 1.3–9.1)
SEGMENTED NEUTROPHILS ABSOLUTE COUNT: 2.98 K/UL (ref 1.3–9.1)
SEGMENTED NEUTROPHILS ABSOLUTE COUNT: 3.18 K/UL (ref 1.3–9.1)
SEGMENTED NEUTROPHILS ABSOLUTE COUNT: 3.84 K/UL (ref 1.3–9.1)
SEGMENTED NEUTROPHILS ABSOLUTE COUNT: 3.91 K/UL (ref 1.3–9.1)
SEGMENTED NEUTROPHILS ABSOLUTE COUNT: 3.94 K/UL (ref 1.3–9.1)
SEGMENTED NEUTROPHILS ABSOLUTE COUNT: 4.15 K/UL (ref 1.3–9.1)
SEGMENTED NEUTROPHILS ABSOLUTE COUNT: 4.2 K/UL (ref 1.3–9.1)
SEGMENTED NEUTROPHILS ABSOLUTE COUNT: 4.48 K/UL (ref 1.3–9.1)
SEGMENTED NEUTROPHILS ABSOLUTE COUNT: 4.6 K/UL (ref 1.3–9.1)
SEGMENTED NEUTROPHILS ABSOLUTE COUNT: 4.83 K/UL (ref 1.3–9.1)
SEGMENTED NEUTROPHILS ABSOLUTE COUNT: 5 K/UL (ref 1.3–9.1)
SEGMENTED NEUTROPHILS ABSOLUTE COUNT: 5.24 K/UL (ref 1.3–9.1)
SEGMENTED NEUTROPHILS ABSOLUTE COUNT: 5.39 K/UL (ref 1.3–9.1)
SEGMENTED NEUTROPHILS ABSOLUTE COUNT: 5.4 K/UL (ref 1.3–9.1)
SEGMENTED NEUTROPHILS ABSOLUTE COUNT: 5.62 K/UL (ref 1.3–9.1)
SEGMENTED NEUTROPHILS ABSOLUTE COUNT: 5.62 K/UL (ref 1.3–9.1)
SEGMENTED NEUTROPHILS ABSOLUTE COUNT: 6.32 K/UL (ref 1.3–9.1)
SEGMENTED NEUTROPHILS ABSOLUTE COUNT: 6.4 K/UL (ref 1.3–9.1)
SEGMENTED NEUTROPHILS ABSOLUTE COUNT: 6.74 K/UL (ref 1.3–9.1)
SEGMENTED NEUTROPHILS ABSOLUTE COUNT: 6.74 K/UL (ref 1.3–9.1)
SODIUM BLD-SCNC: 138 MMOL/L (ref 135–144)
SODIUM BLD-SCNC: 140 MMOL/L (ref 135–144)
SODIUM BLD-SCNC: 141 MMOL/L (ref 135–144)
SODIUM BLD-SCNC: 142 MMOL/L (ref 135–144)
SODIUM BLD-SCNC: 142 MMOL/L (ref 135–144)
SODIUM BLD-SCNC: 143 MMOL/L (ref 135–144)
SODIUM BLD-SCNC: 144 MMOL/L (ref 135–144)
SODIUM BLD-SCNC: 145 MMOL/L (ref 135–144)
SODIUM BLD-SCNC: 145 MMOL/L (ref 135–144)
SODIUM BLD-SCNC: 146 MMOL/L (ref 135–144)
SODIUM BLD-SCNC: 146 MMOL/L (ref 135–144)
SODIUM BLD-SCNC: 147 MMOL/L (ref 135–144)
SODIUM BLD-SCNC: 147 MMOL/L (ref 135–144)
SPECIFIC GRAVITY UA: 1.02 (ref 1–1.03)
SPECIMEN DESCRIPTION: ABNORMAL
SPECIMEN DESCRIPTION: NORMAL
SURGICAL PATHOLOGY REPORT: NORMAL
TOTAL IRON BINDING CAPACITY: 232 UG/DL (ref 250–450)
TOTAL IRON BINDING CAPACITY: 239 UG/DL (ref 250–450)
TOTAL PROT. SUM,%: 101 % (ref 98–102)
TOTAL PROT. SUM: 6.1 G/DL (ref 6.3–8.2)
TOTAL PROTEIN: 5.7 G/DL (ref 6.4–8.3)
TOTAL PROTEIN: 5.8 G/DL (ref 6.4–8.3)
TOTAL PROTEIN: 5.8 G/DL (ref 6.4–8.3)
TOTAL PROTEIN: 6 G/DL (ref 6.4–8.3)
TOTAL PROTEIN: 6.1 G/DL (ref 6.4–8.3)
TOTAL PROTEIN: 6.3 G/DL (ref 6.4–8.3)
TOTAL PROTEIN: 6.4 G/DL (ref 6.4–8.3)
TOTAL PROTEIN: 6.6 G/DL (ref 6.4–8.3)
TOTAL PROTEIN: 6.6 G/DL (ref 6.4–8.3)
TOTAL PROTEIN: 6.7 G/DL (ref 6.4–8.3)
TOTAL PROTEIN: 6.8 G/DL (ref 6.4–8.3)
TOTAL PROTEIN: 7.2 G/DL (ref 6.4–8.3)
TOTAL PROTEIN: 7.8 G/DL (ref 6.4–8.3)
TOTAL PROTEIN: 7.8 G/DL (ref 6.4–8.3)
TRANSFUSION STATUS: NORMAL
TRICHOMONAS: ABNORMAL
TRIGL SERPL-MCNC: 86 MG/DL
TROPONIN INTERP: ABNORMAL
TROPONIN INTERP: NORMAL
TROPONIN INTERP: NORMAL
TROPONIN T: ABNORMAL NG/ML
TROPONIN T: NORMAL NG/ML
TROPONIN T: NORMAL NG/ML
TROPONIN, HIGH SENSITIVITY: 10 NG/L (ref 0–14)
TROPONIN, HIGH SENSITIVITY: 33 NG/L (ref 0–14)
TROPONIN, HIGH SENSITIVITY: 33 NG/L (ref 0–14)
TROPONIN, HIGH SENSITIVITY: 41 NG/L (ref 0–14)
TROPONIN, HIGH SENSITIVITY: 44 NG/L (ref 0–14)
TROPONIN, HIGH SENSITIVITY: 54 NG/L (ref 0–14)
TROPONIN, HIGH SENSITIVITY: 61 NG/L (ref 0–14)
TROPONIN, HIGH SENSITIVITY: 65 NG/L (ref 0–14)
TROPONIN, HIGH SENSITIVITY: 9 NG/L (ref 0–14)
TURBIDITY: ABNORMAL
TURBIDITY: CLEAR
TURBIDITY: CLEAR
UNIT DIVISION: 0
UNIT NUMBER: NORMAL
UNSATURATED IRON BINDING CAPACITY: 208 UG/DL (ref 112–347)
UNSATURATED IRON BINDING CAPACITY: 213 UG/DL (ref 112–347)
URINE HGB: ABNORMAL
URINE HGB: NEGATIVE
URINE HGB: NEGATIVE
UROBILINOGEN, URINE: NORMAL
VITAMIN B-12: 262 PG/ML (ref 232–1245)
VITAMIN B-12: 288 PG/ML (ref 232–1245)
VITAMIN B-12: 322 PG/ML (ref 232–1245)
VLDLC SERPL CALC-MCNC: ABNORMAL MG/DL (ref 1–30)
WBC # BLD: 3.6 K/UL (ref 3.5–11)
WBC # BLD: 4.3 K/UL (ref 3.5–11)
WBC # BLD: 4.4 K/UL (ref 3.5–11)
WBC # BLD: 4.4 K/UL (ref 3.5–11)
WBC # BLD: 4.7 K/UL (ref 3.5–11)
WBC # BLD: 5.4 K/UL (ref 3.5–11)
WBC # BLD: 5.5 K/UL (ref 3.5–11)
WBC # BLD: 5.8 K/UL (ref 3.5–11)
WBC # BLD: 6.1 K/UL (ref 3.5–11)
WBC # BLD: 6.4 K/UL (ref 3.5–11)
WBC # BLD: 6.5 K/UL (ref 3.5–11)
WBC # BLD: 6.6 K/UL (ref 3.5–11)
WBC # BLD: 6.7 K/UL (ref 3.5–11)
WBC # BLD: 7 K/UL (ref 3.5–11)
WBC # BLD: 7 K/UL (ref 3.5–11)
WBC # BLD: 7.2 K/UL (ref 3.5–11)
WBC # BLD: 7.2 K/UL (ref 3.5–11)
WBC # BLD: 7.4 K/UL (ref 3.5–11)
WBC # BLD: 7.5 K/UL (ref 3.5–11)
WBC # BLD: 7.5 K/UL (ref 3.5–11)
WBC # BLD: 7.6 K/UL (ref 3.5–11)
WBC # BLD: 7.7 K/UL (ref 3.5–11)
WBC # BLD: 8 K/UL (ref 3.5–11)
WBC # BLD: 8.1 K/UL (ref 3.5–11)
WBC # BLD: 8.2 K/UL (ref 3.5–11)
WBC # BLD: ABNORMAL 10*3/UL
WBC UA: ABNORMAL /HPF
YEAST: ABNORMAL
ZZ NTE CLEAN UP: ORDERED TEST: NORMAL
ZZ NTE WITH NAME CLEAN UP: SPECIMEN SOURCE: NORMAL

## 2019-01-01 PROCEDURE — 86901 BLOOD TYPING SEROLOGIC RH(D): CPT

## 2019-01-01 PROCEDURE — 73562 X-RAY EXAM OF KNEE 3: CPT

## 2019-01-01 PROCEDURE — 82947 ASSAY GLUCOSE BLOOD QUANT: CPT

## 2019-01-01 PROCEDURE — 96372 THER/PROPH/DIAG INJ SC/IM: CPT

## 2019-01-01 PROCEDURE — 36430 TRANSFUSION BLD/BLD COMPNT: CPT

## 2019-01-01 PROCEDURE — 6370000000 HC RX 637 (ALT 250 FOR IP): Performed by: FAMILY MEDICINE

## 2019-01-01 PROCEDURE — 80053 COMPREHEN METABOLIC PANEL: CPT

## 2019-01-01 PROCEDURE — 85520 HEPARIN ASSAY: CPT

## 2019-01-01 PROCEDURE — G0378 HOSPITAL OBSERVATION PER HR: HCPCS

## 2019-01-01 PROCEDURE — 83540 ASSAY OF IRON: CPT

## 2019-01-01 PROCEDURE — 96376 TX/PRO/DX INJ SAME DRUG ADON: CPT

## 2019-01-01 PROCEDURE — 6360000002 HC RX W HCPCS: Performed by: FAMILY MEDICINE

## 2019-01-01 PROCEDURE — 83690 ASSAY OF LIPASE: CPT

## 2019-01-01 PROCEDURE — 2580000003 HC RX 258: Performed by: FAMILY MEDICINE

## 2019-01-01 PROCEDURE — 92610 EVALUATE SWALLOWING FUNCTION: CPT

## 2019-01-01 PROCEDURE — 99232 SBSQ HOSP IP/OBS MODERATE 35: CPT | Performed by: INTERNAL MEDICINE

## 2019-01-01 PROCEDURE — 6370000000 HC RX 637 (ALT 250 FOR IP): Performed by: PSYCHIATRY & NEUROLOGY

## 2019-01-01 PROCEDURE — 85025 COMPLETE CBC W/AUTO DIFF WBC: CPT

## 2019-01-01 PROCEDURE — APPNB30 APP NON BILLABLE TIME 0-30 MINS: Performed by: NURSE PRACTITIONER

## 2019-01-01 PROCEDURE — 99496 TRANSJ CARE MGMT HIGH F2F 7D: CPT | Performed by: NURSE PRACTITIONER

## 2019-01-01 PROCEDURE — G8427 DOCREV CUR MEDS BY ELIG CLIN: HCPCS | Performed by: NURSE PRACTITIONER

## 2019-01-01 PROCEDURE — 0DBK8ZZ EXCISION OF ASCENDING COLON, VIA NATURAL OR ARTIFICIAL OPENING ENDOSCOPIC: ICD-10-PCS | Performed by: INTERNAL MEDICINE

## 2019-01-01 PROCEDURE — 99222 1ST HOSP IP/OBS MODERATE 55: CPT | Performed by: INTERNAL MEDICINE

## 2019-01-01 PROCEDURE — 85014 HEMATOCRIT: CPT

## 2019-01-01 PROCEDURE — 85730 THROMBOPLASTIN TIME PARTIAL: CPT

## 2019-01-01 PROCEDURE — 1036F TOBACCO NON-USER: CPT | Performed by: NURSE PRACTITIONER

## 2019-01-01 PROCEDURE — 82746 ASSAY OF FOLIC ACID SERUM: CPT

## 2019-01-01 PROCEDURE — C1751 CATH, INF, PER/CENT/MIDLINE: HCPCS

## 2019-01-01 PROCEDURE — 1123F ACP DISCUSS/DSCN MKR DOCD: CPT | Performed by: NURSE PRACTITIONER

## 2019-01-01 PROCEDURE — 36415 COLL VENOUS BLD VENIPUNCTURE: CPT

## 2019-01-01 PROCEDURE — 76937 US GUIDE VASCULAR ACCESS: CPT | Performed by: RADIOLOGY

## 2019-01-01 PROCEDURE — 1111F DSCHRG MED/CURRENT MED MERGE: CPT | Performed by: NURSE PRACTITIONER

## 2019-01-01 PROCEDURE — 96368 THER/DIAG CONCURRENT INF: CPT

## 2019-01-01 PROCEDURE — 3288F FALL RISK ASSESSMENT DOCD: CPT | Performed by: NURSE PRACTITIONER

## 2019-01-01 PROCEDURE — G8400 PT W/DXA NO RESULTS DOC: HCPCS | Performed by: NURSE PRACTITIONER

## 2019-01-01 PROCEDURE — P9016 RBC LEUKOCYTES REDUCED: HCPCS

## 2019-01-01 PROCEDURE — 2060000000 HC ICU INTERMEDIATE R&B

## 2019-01-01 PROCEDURE — 2500000003 HC RX 250 WO HCPCS: Performed by: FAMILY MEDICINE

## 2019-01-01 PROCEDURE — 99214 OFFICE O/P EST MOD 30 MIN: CPT | Performed by: INTERNAL MEDICINE

## 2019-01-01 PROCEDURE — 97162 PT EVAL MOD COMPLEX 30 MIN: CPT

## 2019-01-01 PROCEDURE — 96375 TX/PRO/DX INJ NEW DRUG ADDON: CPT

## 2019-01-01 PROCEDURE — 0518F FALL PLAN OF CARE DOCD: CPT | Performed by: NURSE PRACTITIONER

## 2019-01-01 PROCEDURE — 6360000002 HC RX W HCPCS: Performed by: STUDENT IN AN ORGANIZED HEALTH CARE EDUCATION/TRAINING PROGRAM

## 2019-01-01 PROCEDURE — 83735 ASSAY OF MAGNESIUM: CPT

## 2019-01-01 PROCEDURE — 99214 OFFICE O/P EST MOD 30 MIN: CPT | Performed by: NURSE PRACTITIONER

## 2019-01-01 PROCEDURE — 85018 HEMOGLOBIN: CPT

## 2019-01-01 PROCEDURE — 97166 OT EVAL MOD COMPLEX 45 MIN: CPT

## 2019-01-01 PROCEDURE — G8482 FLU IMMUNIZE ORDER/ADMIN: HCPCS | Performed by: NURSE PRACTITIONER

## 2019-01-01 PROCEDURE — 76830 TRANSVAGINAL US NON-OB: CPT

## 2019-01-01 PROCEDURE — 97530 THERAPEUTIC ACTIVITIES: CPT

## 2019-01-01 PROCEDURE — 99284 EMERGENCY DEPT VISIT MOD MDM: CPT

## 2019-01-01 PROCEDURE — 0518F FALL PLAN OF CARE DOCD: CPT | Performed by: INTERNAL MEDICINE

## 2019-01-01 PROCEDURE — 88342 IMHCHEM/IMCYTCHM 1ST ANTB: CPT

## 2019-01-01 PROCEDURE — 97535 SELF CARE MNGMENT TRAINING: CPT

## 2019-01-01 PROCEDURE — 93005 ELECTROCARDIOGRAM TRACING: CPT | Performed by: EMERGENCY MEDICINE

## 2019-01-01 PROCEDURE — 70450 CT HEAD/BRAIN W/O DYE: CPT

## 2019-01-01 PROCEDURE — 84484 ASSAY OF TROPONIN QUANT: CPT

## 2019-01-01 PROCEDURE — 1036F TOBACCO NON-USER: CPT | Performed by: INTERNAL MEDICINE

## 2019-01-01 PROCEDURE — 80048 BASIC METABOLIC PNL TOTAL CA: CPT

## 2019-01-01 PROCEDURE — 36592 COLLECT BLOOD FROM PICC: CPT

## 2019-01-01 PROCEDURE — 6370000000 HC RX 637 (ALT 250 FOR IP): Performed by: INTERNAL MEDICINE

## 2019-01-01 PROCEDURE — 02HV33Z INSERTION OF INFUSION DEVICE INTO SUPERIOR VENA CAVA, PERCUTANEOUS APPROACH: ICD-10-PCS | Performed by: RADIOLOGY

## 2019-01-01 PROCEDURE — 83550 IRON BINDING TEST: CPT

## 2019-01-01 PROCEDURE — 2709999900 IR GUIDED IVC FILTER PLACEMENT

## 2019-01-01 PROCEDURE — 6370000000 HC RX 637 (ALT 250 FOR IP): Performed by: NURSE PRACTITIONER

## 2019-01-01 PROCEDURE — 2709999900 IR FLUORO GUIDED CVA DEVICE PLMT/REPLACE/REMOVAL

## 2019-01-01 PROCEDURE — 86850 RBC ANTIBODY SCREEN: CPT

## 2019-01-01 PROCEDURE — 6360000002 HC RX W HCPCS: Performed by: INTERNAL MEDICINE

## 2019-01-01 PROCEDURE — 80076 HEPATIC FUNCTION PANEL: CPT

## 2019-01-01 PROCEDURE — 6360000002 HC RX W HCPCS: Performed by: EMERGENCY MEDICINE

## 2019-01-01 PROCEDURE — 3700000000 HC ANESTHESIA ATTENDED CARE: Performed by: INTERNAL MEDICINE

## 2019-01-01 PROCEDURE — 93010 ELECTROCARDIOGRAM REPORT: CPT | Performed by: INTERNAL MEDICINE

## 2019-01-01 PROCEDURE — G8427 DOCREV CUR MEDS BY ELIG CLIN: HCPCS | Performed by: INTERNAL MEDICINE

## 2019-01-01 PROCEDURE — 93880 EXTRACRANIAL BILAT STUDY: CPT

## 2019-01-01 PROCEDURE — 85610 PROTHROMBIN TIME: CPT

## 2019-01-01 PROCEDURE — 51798 US URINE CAPACITY MEASURE: CPT

## 2019-01-01 PROCEDURE — 99285 EMERGENCY DEPT VISIT HI MDM: CPT

## 2019-01-01 PROCEDURE — 6370000000 HC RX 637 (ALT 250 FOR IP): Performed by: EMERGENCY MEDICINE

## 2019-01-01 PROCEDURE — 1200000000 HC SEMI PRIVATE

## 2019-01-01 PROCEDURE — G8417 CALC BMI ABV UP PARAM F/U: HCPCS | Performed by: NURSE PRACTITIONER

## 2019-01-01 PROCEDURE — 1123F ACP DISCUSS/DSCN MKR DOCD: CPT | Performed by: INTERNAL MEDICINE

## 2019-01-01 PROCEDURE — 86900 BLOOD TYPING SEROLOGIC ABO: CPT

## 2019-01-01 PROCEDURE — 2580000003 HC RX 258: Performed by: INTERNAL MEDICINE

## 2019-01-01 PROCEDURE — 86920 COMPATIBILITY TEST SPIN: CPT

## 2019-01-01 PROCEDURE — 2000000000 HC ICU R&B

## 2019-01-01 PROCEDURE — 97167 OT EVAL HIGH COMPLEX 60 MIN: CPT

## 2019-01-01 PROCEDURE — 3288F FALL RISK ASSESSMENT DOCD: CPT | Performed by: INTERNAL MEDICINE

## 2019-01-01 PROCEDURE — 2580000003 HC RX 258: Performed by: STUDENT IN AN ORGANIZED HEALTH CARE EDUCATION/TRAINING PROGRAM

## 2019-01-01 PROCEDURE — 71045 X-RAY EXAM CHEST 1 VIEW: CPT

## 2019-01-01 PROCEDURE — 82728 ASSAY OF FERRITIN: CPT

## 2019-01-01 PROCEDURE — 74176 CT ABD & PELVIS W/O CONTRAST: CPT

## 2019-01-01 PROCEDURE — 0DB68ZX EXCISION OF STOMACH, VIA NATURAL OR ARTIFICIAL OPENING ENDOSCOPIC, DIAGNOSTIC: ICD-10-PCS | Performed by: INTERNAL MEDICINE

## 2019-01-01 PROCEDURE — 43239 EGD BIOPSY SINGLE/MULTIPLE: CPT | Performed by: INTERNAL MEDICINE

## 2019-01-01 PROCEDURE — 6360000002 HC RX W HCPCS: Performed by: NURSE ANESTHETIST, CERTIFIED REGISTERED

## 2019-01-01 PROCEDURE — 2709999900 HC NON-CHARGEABLE SUPPLY: Performed by: INTERNAL MEDICINE

## 2019-01-01 PROCEDURE — 83880 ASSAY OF NATRIURETIC PEPTIDE: CPT

## 2019-01-01 PROCEDURE — 82607 VITAMIN B-12: CPT

## 2019-01-01 PROCEDURE — 45385 COLONOSCOPY W/LESION REMOVAL: CPT | Performed by: INTERNAL MEDICINE

## 2019-01-01 PROCEDURE — 93970 EXTREMITY STUDY: CPT

## 2019-01-01 PROCEDURE — 4040F PNEUMOC VAC/ADMIN/RCVD: CPT | Performed by: NURSE PRACTITIONER

## 2019-01-01 PROCEDURE — 4040F PNEUMOC VAC/ADMIN/RCVD: CPT | Performed by: INTERNAL MEDICINE

## 2019-01-01 PROCEDURE — 36573 INSJ PICC RS&I 5 YR+: CPT | Performed by: RADIOLOGY

## 2019-01-01 PROCEDURE — 2500000003 HC RX 250 WO HCPCS: Performed by: NURSE ANESTHETIST, CERTIFIED REGISTERED

## 2019-01-01 PROCEDURE — 73560 X-RAY EXAM OF KNEE 1 OR 2: CPT

## 2019-01-01 PROCEDURE — 87086 URINE CULTURE/COLONY COUNT: CPT

## 2019-01-01 PROCEDURE — 1111F DSCHRG MED/CURRENT MED MERGE: CPT | Performed by: INTERNAL MEDICINE

## 2019-01-01 PROCEDURE — G8482 FLU IMMUNIZE ORDER/ADMIN: HCPCS | Performed by: INTERNAL MEDICINE

## 2019-01-01 PROCEDURE — 84165 PROTEIN E-PHORESIS SERUM: CPT

## 2019-01-01 PROCEDURE — 97110 THERAPEUTIC EXERCISES: CPT

## 2019-01-01 PROCEDURE — 97116 GAIT TRAINING THERAPY: CPT

## 2019-01-01 PROCEDURE — 83874 ASSAY OF MYOGLOBIN: CPT

## 2019-01-01 PROCEDURE — 2580000003 HC RX 258: Performed by: ANESTHESIOLOGY

## 2019-01-01 PROCEDURE — 1090F PRES/ABSN URINE INCON ASSESS: CPT | Performed by: NURSE PRACTITIONER

## 2019-01-01 PROCEDURE — 36410 VNPNXR 3YR/> PHY/QHP DX/THER: CPT | Performed by: RADIOLOGY

## 2019-01-01 PROCEDURE — 93971 EXTREMITY STUDY: CPT

## 2019-01-01 PROCEDURE — 92526 ORAL FUNCTION THERAPY: CPT

## 2019-01-01 PROCEDURE — 88305 TISSUE EXAM BY PATHOLOGIST: CPT

## 2019-01-01 PROCEDURE — G8598 ASA/ANTIPLAT THER USED: HCPCS | Performed by: NURSE PRACTITIONER

## 2019-01-01 PROCEDURE — 73630 X-RAY EXAM OF FOOT: CPT

## 2019-01-01 PROCEDURE — 3609017100 HC EGD: Performed by: INTERNAL MEDICINE

## 2019-01-01 PROCEDURE — 97161 PT EVAL LOW COMPLEX 20 MIN: CPT

## 2019-01-01 PROCEDURE — 7100000001 HC PACU RECOVERY - ADDTL 15 MIN: Performed by: INTERNAL MEDICINE

## 2019-01-01 PROCEDURE — 6360000002 HC RX W HCPCS: Performed by: ORTHOPAEDIC SURGERY

## 2019-01-01 PROCEDURE — 73610 X-RAY EXAM OF ANKLE: CPT

## 2019-01-01 PROCEDURE — G8598 ASA/ANTIPLAT THER USED: HCPCS | Performed by: INTERNAL MEDICINE

## 2019-01-01 PROCEDURE — 4004F PT TOBACCO SCREEN RCVD TLK: CPT | Performed by: NURSE PRACTITIONER

## 2019-01-01 PROCEDURE — 93306 TTE W/DOPPLER COMPLETE: CPT

## 2019-01-01 PROCEDURE — 83036 HEMOGLOBIN GLYCOSYLATED A1C: CPT

## 2019-01-01 PROCEDURE — 96374 THER/PROPH/DIAG INJ IV PUSH: CPT

## 2019-01-01 PROCEDURE — 2580000003 HC RX 258: Performed by: EMERGENCY MEDICINE

## 2019-01-01 PROCEDURE — 81001 URINALYSIS AUTO W/SCOPE: CPT

## 2019-01-01 PROCEDURE — 76856 US EXAM PELVIC COMPLETE: CPT

## 2019-01-01 PROCEDURE — 2500000003 HC RX 250 WO HCPCS: Performed by: ORTHOPAEDIC SURGERY

## 2019-01-01 PROCEDURE — 94761 N-INVAS EAR/PLS OXIMETRY MLT: CPT

## 2019-01-01 PROCEDURE — 85045 AUTOMATED RETICULOCYTE COUNT: CPT

## 2019-01-01 PROCEDURE — 3609009500 HC COLONOSCOPY DIAGNOSTIC OR SCREENING: Performed by: INTERNAL MEDICINE

## 2019-01-01 PROCEDURE — 99223 1ST HOSP IP/OBS HIGH 75: CPT | Performed by: INTERNAL MEDICINE

## 2019-01-01 PROCEDURE — 84155 ASSAY OF PROTEIN SERUM: CPT

## 2019-01-01 PROCEDURE — G0008 ADMIN INFLUENZA VIRUS VAC: HCPCS | Performed by: FAMILY MEDICINE

## 2019-01-01 PROCEDURE — C9113 INJ PANTOPRAZOLE SODIUM, VIA: HCPCS | Performed by: EMERGENCY MEDICINE

## 2019-01-01 PROCEDURE — 80061 LIPID PANEL: CPT

## 2019-01-01 PROCEDURE — 70551 MRI BRAIN STEM W/O DYE: CPT

## 2019-01-01 PROCEDURE — 0DB98ZX EXCISION OF DUODENUM, VIA NATURAL OR ARTIFICIAL OPENING ENDOSCOPIC, DIAGNOSTIC: ICD-10-PCS | Performed by: INTERNAL MEDICINE

## 2019-01-01 PROCEDURE — 71046 X-RAY EXAM CHEST 2 VIEWS: CPT

## 2019-01-01 PROCEDURE — 7100000000 HC PACU RECOVERY - FIRST 15 MIN: Performed by: INTERNAL MEDICINE

## 2019-01-01 PROCEDURE — 96365 THER/PROPH/DIAG IV INF INIT: CPT

## 2019-01-01 PROCEDURE — 1100F PTFALLS ASSESS-DOCD GE2>/YR: CPT | Performed by: NURSE PRACTITIONER

## 2019-01-01 PROCEDURE — 3700000001 HC ADD 15 MINUTES (ANESTHESIA): Performed by: INTERNAL MEDICINE

## 2019-01-01 PROCEDURE — 2580000003 HC RX 258: Performed by: NURSE PRACTITIONER

## 2019-01-01 PROCEDURE — G8417 CALC BMI ABV UP PARAM F/U: HCPCS | Performed by: INTERNAL MEDICINE

## 2019-01-01 PROCEDURE — 6360000004 HC RX CONTRAST MEDICATION: Performed by: FAMILY MEDICINE

## 2019-01-01 PROCEDURE — 90686 IIV4 VACC NO PRSV 0.5 ML IM: CPT | Performed by: FAMILY MEDICINE

## 2019-01-01 PROCEDURE — G8400 PT W/DXA NO RESULTS DOC: HCPCS | Performed by: INTERNAL MEDICINE

## 2019-01-01 PROCEDURE — 06H03DZ INSERTION OF INTRALUMINAL DEVICE INTO INFERIOR VENA CAVA, PERCUTANEOUS APPROACH: ICD-10-PCS | Performed by: RADIOLOGY

## 2019-01-01 PROCEDURE — 83615 LACTATE (LD) (LDH) ENZYME: CPT

## 2019-01-01 PROCEDURE — 37191 INS ENDOVAS VENA CAVA FILTR: CPT | Performed by: RADIOLOGY

## 2019-01-01 PROCEDURE — 93005 ELECTROCARDIOGRAM TRACING: CPT

## 2019-01-01 PROCEDURE — 83010 ASSAY OF HAPTOGLOBIN QUANT: CPT

## 2019-01-01 PROCEDURE — B5191ZZ FLUOROSCOPY OF INFERIOR VENA CAVA USING LOW OSMOLAR CONTRAST: ICD-10-PCS | Performed by: RADIOLOGY

## 2019-01-01 PROCEDURE — 1090F PRES/ABSN URINE INCON ASSESS: CPT | Performed by: INTERNAL MEDICINE

## 2019-01-01 RX ORDER — HYDRALAZINE HYDROCHLORIDE 20 MG/ML
10 INJECTION INTRAMUSCULAR; INTRAVENOUS EVERY 6 HOURS PRN
Status: DISCONTINUED | OUTPATIENT
Start: 2019-01-01 | End: 2019-01-01 | Stop reason: HOSPADM

## 2019-01-01 RX ORDER — HYDRALAZINE HYDROCHLORIDE 50 MG/1
100 TABLET, FILM COATED ORAL 2 TIMES DAILY
Status: DISCONTINUED | OUTPATIENT
Start: 2019-01-01 | End: 2019-01-01 | Stop reason: HOSPADM

## 2019-01-01 RX ORDER — SODIUM CHLORIDE 0.9 % (FLUSH) 0.9 %
10 SYRINGE (ML) INJECTION EVERY 12 HOURS SCHEDULED
Status: DISCONTINUED | OUTPATIENT
Start: 2019-01-01 | End: 2019-01-01 | Stop reason: HOSPADM

## 2019-01-01 RX ORDER — HEPARIN SODIUM 10000 [USP'U]/100ML
18 INJECTION, SOLUTION INTRAVENOUS CONTINUOUS
Status: DISCONTINUED | OUTPATIENT
Start: 2019-01-01 | End: 2019-01-01

## 2019-01-01 RX ORDER — POLYETHYLENE GLYCOL 3350 17 G/17G
17 POWDER, FOR SOLUTION ORAL ONCE
Status: COMPLETED | OUTPATIENT
Start: 2019-01-01 | End: 2019-01-01

## 2019-01-01 RX ORDER — SIMVASTATIN 40 MG
40 TABLET ORAL NIGHTLY
Status: DISCONTINUED | OUTPATIENT
Start: 2019-01-01 | End: 2019-01-01 | Stop reason: HOSPADM

## 2019-01-01 RX ORDER — SODIUM CHLORIDE 0.9 % (FLUSH) 0.9 %
10 SYRINGE (ML) INJECTION PRN
Status: DISCONTINUED | OUTPATIENT
Start: 2019-01-01 | End: 2019-01-01 | Stop reason: HOSPADM

## 2019-01-01 RX ORDER — ACETAMINOPHEN 325 MG/1
650 TABLET ORAL EVERY 4 HOURS PRN
Status: DISCONTINUED | OUTPATIENT
Start: 2019-01-01 | End: 2019-01-01 | Stop reason: SDUPTHER

## 2019-01-01 RX ORDER — CLOPIDOGREL BISULFATE 75 MG/1
TABLET ORAL
Qty: 90 TABLET | Refills: 1 | Status: ON HOLD | OUTPATIENT
Start: 2019-01-01 | End: 2019-01-01 | Stop reason: HOSPADM

## 2019-01-01 RX ORDER — CARVEDILOL 25 MG/1
25 TABLET ORAL 2 TIMES DAILY WITH MEALS
Qty: 180 TABLET | Refills: 1 | Status: SHIPPED | OUTPATIENT
Start: 2019-01-01 | End: 2019-01-01 | Stop reason: SDUPTHER

## 2019-01-01 RX ORDER — CLOPIDOGREL BISULFATE 75 MG/1
75 TABLET ORAL DAILY
Status: DISCONTINUED | OUTPATIENT
Start: 2019-01-01 | End: 2019-01-01 | Stop reason: HOSPADM

## 2019-01-01 RX ORDER — CARVEDILOL 25 MG/1
25 TABLET ORAL 2 TIMES DAILY WITH MEALS
Status: DISCONTINUED | OUTPATIENT
Start: 2019-01-01 | End: 2019-01-01 | Stop reason: HOSPADM

## 2019-01-01 RX ORDER — FUROSEMIDE 10 MG/ML
20 INJECTION INTRAMUSCULAR; INTRAVENOUS ONCE
Status: COMPLETED | OUTPATIENT
Start: 2019-01-01 | End: 2019-01-01

## 2019-01-01 RX ORDER — CARVEDILOL 25 MG/1
25 TABLET ORAL 2 TIMES DAILY WITH MEALS
Status: DISCONTINUED | OUTPATIENT
Start: 2020-01-01 | End: 2020-01-01 | Stop reason: HOSPADM

## 2019-01-01 RX ORDER — SODIUM CHLORIDE 0.9 % (FLUSH) 0.9 %
10 SYRINGE (ML) INJECTION PRN
Status: DISCONTINUED | OUTPATIENT
Start: 2019-01-01 | End: 2019-01-01 | Stop reason: SDUPTHER

## 2019-01-01 RX ORDER — CARVEDILOL 25 MG/1
25 TABLET ORAL ONCE
Status: COMPLETED | OUTPATIENT
Start: 2019-01-01 | End: 2019-01-01

## 2019-01-01 RX ORDER — POLYETHYLENE GLYCOL 3350 17 G/17G
17 POWDER, FOR SOLUTION ORAL NIGHTLY
Status: DISCONTINUED | OUTPATIENT
Start: 2019-01-01 | End: 2019-01-01

## 2019-01-01 RX ORDER — PANTOPRAZOLE SODIUM 40 MG/1
40 TABLET, DELAYED RELEASE ORAL DAILY
Status: DISCONTINUED | OUTPATIENT
Start: 2020-01-01 | End: 2020-01-01

## 2019-01-01 RX ORDER — HYDRALAZINE HYDROCHLORIDE 50 MG/1
50 TABLET, FILM COATED ORAL 2 TIMES DAILY
Qty: 180 TABLET | Refills: 1 | Status: ON HOLD | OUTPATIENT
Start: 2019-01-01 | End: 2019-01-01 | Stop reason: HOSPADM

## 2019-01-01 RX ORDER — FOLIC ACID 1 MG/1
1 TABLET ORAL DAILY
Status: DISCONTINUED | OUTPATIENT
Start: 2019-01-01 | End: 2019-01-01 | Stop reason: HOSPADM

## 2019-01-01 RX ORDER — SUCRALFATE 1 G/1
1 TABLET ORAL EVERY 6 HOURS SCHEDULED
Status: DISCONTINUED | OUTPATIENT
Start: 2019-01-01 | End: 2019-01-01 | Stop reason: HOSPADM

## 2019-01-01 RX ORDER — POLYETHYLENE GLYCOL 3350 17 G/17G
17 POWDER, FOR SOLUTION ORAL DAILY PRN
Status: DISCONTINUED | OUTPATIENT
Start: 2019-01-01 | End: 2019-01-01 | Stop reason: HOSPADM

## 2019-01-01 RX ORDER — MORPHINE SULFATE 4 MG/ML
4 INJECTION, SOLUTION INTRAMUSCULAR; INTRAVENOUS
Status: DISCONTINUED | OUTPATIENT
Start: 2019-01-01 | End: 2019-01-01 | Stop reason: HOSPADM

## 2019-01-01 RX ORDER — SUCRALFATE 1 G/1
1 TABLET ORAL 4 TIMES DAILY
Qty: 120 TABLET | Refills: 3 | Status: SHIPPED | OUTPATIENT
Start: 2019-01-01

## 2019-01-01 RX ORDER — ONDANSETRON 2 MG/ML
4 INJECTION INTRAMUSCULAR; INTRAVENOUS
Status: DISCONTINUED | OUTPATIENT
Start: 2019-01-01 | End: 2019-01-01 | Stop reason: HOSPADM

## 2019-01-01 RX ORDER — HYDROCODONE BITARTRATE AND ACETAMINOPHEN 5; 325 MG/1; MG/1
1 TABLET ORAL EVERY 6 HOURS PRN
Status: DISCONTINUED | OUTPATIENT
Start: 2019-01-01 | End: 2019-01-01 | Stop reason: HOSPADM

## 2019-01-01 RX ORDER — OXYCODONE HYDROCHLORIDE AND ACETAMINOPHEN 5; 325 MG/1; MG/1
1 TABLET ORAL EVERY 6 HOURS PRN
Qty: 15 TABLET | Refills: 0 | Status: ON HOLD | OUTPATIENT
Start: 2019-01-01 | End: 2019-01-01 | Stop reason: SDUPTHER

## 2019-01-01 RX ORDER — HYDRALAZINE HYDROCHLORIDE 25 MG/1
75 TABLET, FILM COATED ORAL EVERY 12 HOURS SCHEDULED
Qty: 90 TABLET | Refills: 3
Start: 2019-01-01

## 2019-01-01 RX ORDER — ACETAMINOPHEN 500 MG
500 TABLET ORAL EVERY 6 HOURS PRN
COMMUNITY

## 2019-01-01 RX ORDER — OXYCODONE HYDROCHLORIDE AND ACETAMINOPHEN 5; 325 MG/1; MG/1
1 TABLET ORAL EVERY 6 HOURS PRN
Status: ON HOLD | COMMUNITY
End: 2019-01-01 | Stop reason: SDUPTHER

## 2019-01-01 RX ORDER — ASPIRIN 81 MG/1
81 TABLET, CHEWABLE ORAL DAILY
Status: DISCONTINUED | OUTPATIENT
Start: 2019-01-01 | End: 2019-01-01 | Stop reason: HOSPADM

## 2019-01-01 RX ORDER — HYDROCODONE BITARTRATE AND ACETAMINOPHEN 5; 325 MG/1; MG/1
1 TABLET ORAL EVERY 4 HOURS PRN
Status: DISCONTINUED | OUTPATIENT
Start: 2019-01-01 | End: 2020-01-01 | Stop reason: HOSPADM

## 2019-01-01 RX ORDER — POTASSIUM CHLORIDE 7.45 MG/ML
10 INJECTION INTRAVENOUS PRN
Status: DISCONTINUED | OUTPATIENT
Start: 2019-01-01 | End: 2019-01-01 | Stop reason: HOSPADM

## 2019-01-01 RX ORDER — PANTOPRAZOLE SODIUM 40 MG/1
40 TABLET, DELAYED RELEASE ORAL DAILY
Status: DISCONTINUED | OUTPATIENT
Start: 2019-01-01 | End: 2019-01-01 | Stop reason: HOSPADM

## 2019-01-01 RX ORDER — HYDRALAZINE HYDROCHLORIDE 20 MG/ML
25 INJECTION INTRAMUSCULAR; INTRAVENOUS ONCE
Status: COMPLETED | OUTPATIENT
Start: 2019-01-01 | End: 2019-01-01

## 2019-01-01 RX ORDER — ACETAMINOPHEN 500 MG
500 TABLET ORAL EVERY 6 HOURS PRN
Status: DISCONTINUED | OUTPATIENT
Start: 2019-01-01 | End: 2019-01-01 | Stop reason: SDUPTHER

## 2019-01-01 RX ORDER — OXYCODONE HYDROCHLORIDE AND ACETAMINOPHEN 5; 325 MG/1; MG/1
1 TABLET ORAL EVERY 8 HOURS PRN
Status: DISCONTINUED | OUTPATIENT
Start: 2019-01-01 | End: 2019-01-01 | Stop reason: HOSPADM

## 2019-01-01 RX ORDER — SUCRALFATE 1 G/1
1 TABLET ORAL 4 TIMES DAILY
Status: DISCONTINUED | OUTPATIENT
Start: 2019-01-01 | End: 2019-01-01 | Stop reason: HOSPADM

## 2019-01-01 RX ORDER — OXYCODONE HYDROCHLORIDE AND ACETAMINOPHEN 5; 325 MG/1; MG/1
1 TABLET ORAL EVERY 4 HOURS PRN
Qty: 15 TABLET | Refills: 0 | Status: SHIPPED | OUTPATIENT
Start: 2019-01-01 | End: 2019-01-01

## 2019-01-01 RX ORDER — METOPROLOL TARTRATE 5 MG/5ML
5 INJECTION INTRAVENOUS EVERY 6 HOURS PRN
Status: DISCONTINUED | OUTPATIENT
Start: 2019-01-01 | End: 2020-01-01

## 2019-01-01 RX ORDER — ONDANSETRON 2 MG/ML
4 INJECTION INTRAMUSCULAR; INTRAVENOUS EVERY 6 HOURS PRN
Status: DISCONTINUED | OUTPATIENT
Start: 2019-01-01 | End: 2019-01-01 | Stop reason: HOSPADM

## 2019-01-01 RX ORDER — ACETAMINOPHEN 500 MG
500 TABLET ORAL ONCE
Status: COMPLETED | OUTPATIENT
Start: 2019-01-01 | End: 2019-01-01

## 2019-01-01 RX ORDER — ACETAMINOPHEN 500 MG
500 TABLET ORAL EVERY 6 HOURS PRN
Status: DISCONTINUED | OUTPATIENT
Start: 2019-01-01 | End: 2019-01-01 | Stop reason: HOSPADM

## 2019-01-01 RX ORDER — PROPOFOL 10 MG/ML
INJECTION, EMULSION INTRAVENOUS PRN
Status: DISCONTINUED | OUTPATIENT
Start: 2019-01-01 | End: 2019-01-01 | Stop reason: SDUPTHER

## 2019-01-01 RX ORDER — CLOBETASOL PROPIONATE 0.5 MG/G
CREAM TOPICAL
Qty: 45 G | Refills: 0 | Status: SHIPPED | OUTPATIENT
Start: 2019-01-01 | End: 2019-01-01 | Stop reason: SDUPTHER

## 2019-01-01 RX ORDER — LIDOCAINE HYDROCHLORIDE 10 MG/ML
INJECTION, SOLUTION EPIDURAL; INFILTRATION; INTRACAUDAL; PERINEURAL PRN
Status: DISCONTINUED | OUTPATIENT
Start: 2019-01-01 | End: 2019-01-01 | Stop reason: SDUPTHER

## 2019-01-01 RX ORDER — HEPARIN SODIUM 5000 [USP'U]/ML
80 INJECTION, SOLUTION INTRAVENOUS; SUBCUTANEOUS ONCE
Status: COMPLETED | OUTPATIENT
Start: 2019-01-01 | End: 2019-01-01

## 2019-01-01 RX ORDER — DEXTROSE MONOHYDRATE 50 MG/ML
100 INJECTION, SOLUTION INTRAVENOUS PRN
Status: DISCONTINUED | OUTPATIENT
Start: 2019-01-01 | End: 2019-01-01 | Stop reason: HOSPADM

## 2019-01-01 RX ORDER — SUCRALFATE 1 G/1
1 TABLET ORAL
Status: DISCONTINUED | OUTPATIENT
Start: 2019-01-01 | End: 2019-01-01 | Stop reason: HOSPADM

## 2019-01-01 RX ORDER — MORPHINE SULFATE 4 MG/ML
4 INJECTION, SOLUTION INTRAMUSCULAR; INTRAVENOUS ONCE
Status: COMPLETED | OUTPATIENT
Start: 2019-01-01 | End: 2019-01-01

## 2019-01-01 RX ORDER — CLOBETASOL PROPIONATE 0.5 MG/G
CREAM TOPICAL 2 TIMES DAILY
Status: DISCONTINUED | OUTPATIENT
Start: 2019-01-01 | End: 2019-01-01 | Stop reason: HOSPADM

## 2019-01-01 RX ORDER — 0.9 % SODIUM CHLORIDE 0.9 %
250 INTRAVENOUS SOLUTION INTRAVENOUS ONCE
Status: COMPLETED | OUTPATIENT
Start: 2019-01-01 | End: 2019-01-01

## 2019-01-01 RX ORDER — SIMVASTATIN 40 MG
40 TABLET ORAL NIGHTLY
Qty: 90 TABLET | Refills: 1 | Status: SHIPPED | OUTPATIENT
Start: 2019-01-01

## 2019-01-01 RX ORDER — 0.9 % SODIUM CHLORIDE 0.9 %
250 INTRAVENOUS SOLUTION INTRAVENOUS ONCE
Status: DISCONTINUED | OUTPATIENT
Start: 2019-01-01 | End: 2019-01-01

## 2019-01-01 RX ORDER — PROPOFOL 10 MG/ML
INJECTION, EMULSION INTRAVENOUS CONTINUOUS PRN
Status: DISCONTINUED | OUTPATIENT
Start: 2019-01-01 | End: 2019-01-01 | Stop reason: SDUPTHER

## 2019-01-01 RX ORDER — PANTOPRAZOLE SODIUM 40 MG/1
40 TABLET, DELAYED RELEASE ORAL DAILY
Qty: 90 TABLET | Refills: 1 | Status: SHIPPED | OUTPATIENT
Start: 2019-01-01

## 2019-01-01 RX ORDER — OXYCODONE HYDROCHLORIDE AND ACETAMINOPHEN 5; 325 MG/1; MG/1
1 TABLET ORAL EVERY 8 HOURS PRN
Qty: 15 TABLET | Refills: 0 | Status: SHIPPED | OUTPATIENT
Start: 2019-01-01 | End: 2019-01-01

## 2019-01-01 RX ORDER — POTASSIUM CHLORIDE 20 MEQ/1
40 TABLET, EXTENDED RELEASE ORAL PRN
Status: DISCONTINUED | OUTPATIENT
Start: 2019-01-01 | End: 2019-01-01 | Stop reason: HOSPADM

## 2019-01-01 RX ORDER — OXYCODONE HYDROCHLORIDE AND ACETAMINOPHEN 5; 325 MG/1; MG/1
1 TABLET ORAL EVERY 6 HOURS PRN
Qty: 12 TABLET | Refills: 0 | Status: SHIPPED | OUTPATIENT
Start: 2019-01-01 | End: 2019-01-01

## 2019-01-01 RX ORDER — METOPROLOL TARTRATE 5 MG/5ML
5 INJECTION INTRAVENOUS EVERY 6 HOURS PRN
Status: DISCONTINUED | OUTPATIENT
Start: 2019-01-01 | End: 2019-01-01 | Stop reason: HOSPADM

## 2019-01-01 RX ORDER — MORPHINE SULFATE 2 MG/ML
1 INJECTION, SOLUTION INTRAMUSCULAR; INTRAVENOUS EVERY 6 HOURS PRN
Status: DISCONTINUED | OUTPATIENT
Start: 2019-01-01 | End: 2019-01-01 | Stop reason: HOSPADM

## 2019-01-01 RX ORDER — DOCUSATE SODIUM 100 MG/1
100 CAPSULE, LIQUID FILLED ORAL DAILY
Status: DISCONTINUED | OUTPATIENT
Start: 2019-01-01 | End: 2019-01-01 | Stop reason: HOSPADM

## 2019-01-01 RX ORDER — OXYCODONE HYDROCHLORIDE AND ACETAMINOPHEN 5; 325 MG/1; MG/1
1 TABLET ORAL EVERY 4 HOURS PRN
Status: DISCONTINUED | OUTPATIENT
Start: 2019-01-01 | End: 2019-01-01 | Stop reason: HOSPADM

## 2019-01-01 RX ORDER — FUROSEMIDE 20 MG/1
20 TABLET ORAL 2 TIMES DAILY
Qty: 180 TABLET | Refills: 1 | Status: ON HOLD | OUTPATIENT
Start: 2019-01-01 | End: 2019-01-01 | Stop reason: HOSPADM

## 2019-01-01 RX ORDER — MORPHINE SULFATE 2 MG/ML
2 INJECTION, SOLUTION INTRAMUSCULAR; INTRAVENOUS
Status: DISCONTINUED | OUTPATIENT
Start: 2019-01-01 | End: 2019-01-01 | Stop reason: HOSPADM

## 2019-01-01 RX ORDER — POLYETHYLENE GLYCOL 3350 17 G/17G
250 POWDER, FOR SOLUTION ORAL ONCE
Status: COMPLETED | OUTPATIENT
Start: 2019-01-01 | End: 2019-01-01

## 2019-01-01 RX ORDER — OXYCODONE HYDROCHLORIDE AND ACETAMINOPHEN 5; 325 MG/1; MG/1
1 TABLET ORAL EVERY 8 HOURS PRN
Qty: 15 TABLET | Refills: 0 | Status: ON HOLD | OUTPATIENT
Start: 2019-01-01 | End: 2019-01-01

## 2019-01-01 RX ORDER — TRIAMCINOLONE ACETONIDE 40 MG/ML
40 INJECTION, SUSPENSION INTRA-ARTICULAR; INTRAMUSCULAR ONCE
Status: COMPLETED | OUTPATIENT
Start: 2019-01-01 | End: 2019-01-01

## 2019-01-01 RX ORDER — FUROSEMIDE 20 MG/1
20 TABLET ORAL 2 TIMES DAILY
Status: DISCONTINUED | OUTPATIENT
Start: 2019-01-01 | End: 2019-01-01 | Stop reason: HOSPADM

## 2019-01-01 RX ORDER — NICOTINE POLACRILEX 4 MG
15 LOZENGE BUCCAL PRN
Status: DISCONTINUED | OUTPATIENT
Start: 2019-01-01 | End: 2019-01-01 | Stop reason: HOSPADM

## 2019-01-01 RX ORDER — POLYETHYLENE GLYCOL 3350 17 G/17G
17 POWDER, FOR SOLUTION ORAL NIGHTLY
Status: DISCONTINUED | OUTPATIENT
Start: 2019-01-01 | End: 2019-01-01 | Stop reason: HOSPADM

## 2019-01-01 RX ORDER — MAGNESIUM SULFATE 1 G/100ML
1 INJECTION INTRAVENOUS PRN
Status: DISCONTINUED | OUTPATIENT
Start: 2019-01-01 | End: 2019-01-01 | Stop reason: HOSPADM

## 2019-01-01 RX ORDER — ASPIRIN 81 MG/1
81 TABLET, CHEWABLE ORAL DAILY
Qty: 30 TABLET | Refills: 3 | Status: ON HOLD | OUTPATIENT
Start: 2019-01-01 | End: 2019-01-01 | Stop reason: HOSPADM

## 2019-01-01 RX ORDER — ACETAMINOPHEN 325 MG/1
650 TABLET ORAL EVERY 4 HOURS PRN
Status: DISCONTINUED | OUTPATIENT
Start: 2019-01-01 | End: 2019-01-01 | Stop reason: HOSPADM

## 2019-01-01 RX ORDER — 0.9 % SODIUM CHLORIDE 0.9 %
500 INTRAVENOUS SOLUTION INTRAVENOUS ONCE
Status: COMPLETED | OUTPATIENT
Start: 2019-01-01 | End: 2019-01-01

## 2019-01-01 RX ORDER — CLOBETASOL PROPIONATE 0.5 MG/G
CREAM TOPICAL
Qty: 45 G | Refills: 1 | Status: SHIPPED | OUTPATIENT
Start: 2019-01-01

## 2019-01-01 RX ORDER — SENNA AND DOCUSATE SODIUM 50; 8.6 MG/1; MG/1
1 TABLET, FILM COATED ORAL NIGHTLY PRN
Status: DISCONTINUED | OUTPATIENT
Start: 2019-01-01 | End: 2019-01-01 | Stop reason: HOSPADM

## 2019-01-01 RX ORDER — ACETAMINOPHEN 325 MG/1
650 TABLET ORAL EVERY 4 HOURS PRN
Status: DISCONTINUED | OUTPATIENT
Start: 2019-01-01 | End: 2020-01-01

## 2019-01-01 RX ORDER — SIMVASTATIN 40 MG
40 TABLET ORAL NIGHTLY
Status: DISCONTINUED | OUTPATIENT
Start: 2019-01-01 | End: 2020-01-01 | Stop reason: HOSPADM

## 2019-01-01 RX ORDER — BUPIVACAINE HYDROCHLORIDE 5 MG/ML
30 INJECTION, SOLUTION EPIDURAL; INTRACAUDAL ONCE
Status: COMPLETED | OUTPATIENT
Start: 2019-01-01 | End: 2019-01-01

## 2019-01-01 RX ORDER — FUROSEMIDE 20 MG/1
20 TABLET ORAL 2 TIMES DAILY
Qty: 180 TABLET | Refills: 1 | Status: SHIPPED | OUTPATIENT
Start: 2019-01-01 | End: 2019-01-01 | Stop reason: SDUPTHER

## 2019-01-01 RX ORDER — POLYETHYLENE GLYCOL 3350 17 G/17G
17 POWDER, FOR SOLUTION ORAL NIGHTLY
Status: DISCONTINUED | OUTPATIENT
Start: 2019-01-01 | End: 2019-01-01 | Stop reason: CLARIF

## 2019-01-01 RX ORDER — SODIUM CHLORIDE 9 MG/ML
INJECTION, SOLUTION INTRAVENOUS ONCE
Status: COMPLETED | OUTPATIENT
Start: 2019-01-01 | End: 2019-01-01

## 2019-01-01 RX ORDER — HYDRALAZINE HYDROCHLORIDE 50 MG/1
50 TABLET, FILM COATED ORAL 2 TIMES DAILY
Qty: 180 TABLET | Refills: 1 | Status: SHIPPED | OUTPATIENT
Start: 2019-01-01 | End: 2019-01-01 | Stop reason: SDUPTHER

## 2019-01-01 RX ORDER — SODIUM CHLORIDE 9 MG/ML
INJECTION, SOLUTION INTRAVENOUS CONTINUOUS
Status: DISCONTINUED | OUTPATIENT
Start: 2019-01-01 | End: 2020-01-01

## 2019-01-01 RX ORDER — OXYCODONE HYDROCHLORIDE AND ACETAMINOPHEN 5; 325 MG/1; MG/1
1 TABLET ORAL EVERY 6 HOURS PRN
Qty: 15 TABLET | Refills: 0 | Status: SHIPPED | OUTPATIENT
Start: 2019-01-01 | End: 2019-01-01

## 2019-01-01 RX ORDER — CYCLOBENZAPRINE HCL 10 MG
5 TABLET ORAL 2 TIMES DAILY PRN
Status: DISCONTINUED | OUTPATIENT
Start: 2019-01-01 | End: 2019-01-01

## 2019-01-01 RX ORDER — CYANOCOBALAMIN 1000 UG/ML
1000 INJECTION INTRAMUSCULAR; SUBCUTANEOUS DAILY
Status: DISCONTINUED | OUTPATIENT
Start: 2019-01-01 | End: 2019-01-01 | Stop reason: HOSPADM

## 2019-01-01 RX ORDER — SODIUM CHLORIDE 0.9 % (FLUSH) 0.9 %
10 SYRINGE (ML) INJECTION PRN
Status: DISCONTINUED | OUTPATIENT
Start: 2019-01-01 | End: 2020-01-01 | Stop reason: SDUPTHER

## 2019-01-01 RX ORDER — HEPARIN SODIUM 5000 [USP'U]/ML
40 INJECTION, SOLUTION INTRAVENOUS; SUBCUTANEOUS PRN
Status: DISCONTINUED | OUTPATIENT
Start: 2019-01-01 | End: 2019-01-01

## 2019-01-01 RX ORDER — SODIUM CHLORIDE 0.9 % (FLUSH) 0.9 %
10 SYRINGE (ML) INJECTION EVERY 12 HOURS SCHEDULED
Status: DISCONTINUED | OUTPATIENT
Start: 2019-01-01 | End: 2019-01-01 | Stop reason: SDUPTHER

## 2019-01-01 RX ORDER — HEPARIN SODIUM 5000 [USP'U]/ML
80 INJECTION, SOLUTION INTRAVENOUS; SUBCUTANEOUS PRN
Status: DISCONTINUED | OUTPATIENT
Start: 2019-01-01 | End: 2019-01-01

## 2019-01-01 RX ORDER — SODIUM CHLORIDE 0.9 % (FLUSH) 0.9 %
10 SYRINGE (ML) INJECTION EVERY 12 HOURS SCHEDULED
Status: DISCONTINUED | OUTPATIENT
Start: 2019-01-01 | End: 2020-01-01 | Stop reason: SDUPTHER

## 2019-01-01 RX ORDER — POLYETHYLENE GLYCOL 3350 17 G/17G
17 POWDER, FOR SOLUTION ORAL NIGHTLY
COMMUNITY

## 2019-01-01 RX ORDER — HYDRALAZINE HYDROCHLORIDE 25 MG/1
25 TABLET, FILM COATED ORAL ONCE
Status: COMPLETED | OUTPATIENT
Start: 2019-01-01 | End: 2019-01-01

## 2019-01-01 RX ORDER — SODIUM CHLORIDE 9 MG/ML
INJECTION, SOLUTION INTRAVENOUS CONTINUOUS
Status: DISCONTINUED | OUTPATIENT
Start: 2019-01-01 | End: 2019-01-01 | Stop reason: HOSPADM

## 2019-01-01 RX ORDER — FENTANYL CITRATE 50 UG/ML
25 INJECTION, SOLUTION INTRAMUSCULAR; INTRAVENOUS ONCE
Status: DISCONTINUED | OUTPATIENT
Start: 2019-01-01 | End: 2019-01-01 | Stop reason: HOSPADM

## 2019-01-01 RX ORDER — CARVEDILOL 25 MG/1
25 TABLET ORAL 2 TIMES DAILY WITH MEALS
Qty: 180 TABLET | Refills: 1 | Status: SHIPPED | OUTPATIENT
Start: 2019-01-01

## 2019-01-01 RX ORDER — SODIUM CHLORIDE 9 MG/ML
INJECTION, SOLUTION INTRAVENOUS CONTINUOUS
Status: ACTIVE | OUTPATIENT
Start: 2019-01-01 | End: 2019-01-01

## 2019-01-01 RX ORDER — AMOXICILLIN 250 MG
1 CAPSULE ORAL NIGHTLY PRN
COMMUNITY

## 2019-01-01 RX ORDER — SODIUM CHLORIDE 9 MG/ML
INJECTION, SOLUTION INTRAVENOUS CONTINUOUS
Status: DISCONTINUED | OUTPATIENT
Start: 2019-01-01 | End: 2019-01-01

## 2019-01-01 RX ORDER — DEXTROSE MONOHYDRATE 25 G/50ML
12.5 INJECTION, SOLUTION INTRAVENOUS PRN
Status: DISCONTINUED | OUTPATIENT
Start: 2019-01-01 | End: 2019-01-01 | Stop reason: HOSPADM

## 2019-01-01 RX ORDER — HYDROCODONE BITARTRATE AND ACETAMINOPHEN 5; 325 MG/1; MG/1
2 TABLET ORAL EVERY 4 HOURS PRN
Status: DISCONTINUED | OUTPATIENT
Start: 2019-01-01 | End: 2020-01-01 | Stop reason: HOSPADM

## 2019-01-01 RX ORDER — HYDRALAZINE HYDROCHLORIDE 50 MG/1
50 TABLET, FILM COATED ORAL 2 TIMES DAILY
Status: DISCONTINUED | OUTPATIENT
Start: 2019-01-01 | End: 2019-01-01

## 2019-01-01 RX ORDER — MORPHINE SULFATE 2 MG/ML
2 INJECTION, SOLUTION INTRAMUSCULAR; INTRAVENOUS EVERY 4 HOURS PRN
Status: DISCONTINUED | OUTPATIENT
Start: 2019-01-01 | End: 2019-01-01

## 2019-01-01 RX ORDER — HYDRALAZINE HYDROCHLORIDE 100 MG/1
100 TABLET, FILM COATED ORAL 2 TIMES DAILY
Qty: 60 TABLET | Refills: 3 | Status: ON HOLD | OUTPATIENT
Start: 2019-01-01 | End: 2019-01-01 | Stop reason: HOSPADM

## 2019-01-01 RX ORDER — ACETAMINOPHEN 500 MG
1000 TABLET ORAL ONCE
Status: DISCONTINUED | OUTPATIENT
Start: 2019-01-01 | End: 2019-01-01 | Stop reason: HOSPADM

## 2019-01-01 RX ORDER — MORPHINE SULFATE 2 MG/ML
2 INJECTION, SOLUTION INTRAMUSCULAR; INTRAVENOUS EVERY 4 HOURS PRN
Status: DISCONTINUED | OUTPATIENT
Start: 2019-01-01 | End: 2019-01-01 | Stop reason: HOSPADM

## 2019-01-01 RX ORDER — OXYCODONE HYDROCHLORIDE AND ACETAMINOPHEN 5; 325 MG/1; MG/1
1 TABLET ORAL EVERY 6 HOURS PRN
Status: DISCONTINUED | OUTPATIENT
Start: 2019-01-01 | End: 2019-01-01 | Stop reason: HOSPADM

## 2019-01-01 RX ORDER — CLOPIDOGREL BISULFATE 75 MG/1
TABLET ORAL
Qty: 90 TABLET | Refills: 1 | Status: SHIPPED | OUTPATIENT
Start: 2019-01-01 | End: 2019-01-01 | Stop reason: SDUPTHER

## 2019-01-01 RX ORDER — ONDANSETRON 2 MG/ML
4 INJECTION INTRAMUSCULAR; INTRAVENOUS ONCE
Status: COMPLETED | OUTPATIENT
Start: 2019-01-01 | End: 2019-01-01

## 2019-01-01 RX ADMIN — DICLOFENAC 2 G: 10 GEL TOPICAL at 20:22

## 2019-01-01 RX ADMIN — HEPARIN SODIUM 5450 UNITS: 5000 INJECTION, SOLUTION INTRAVENOUS; SUBCUTANEOUS at 22:39

## 2019-01-01 RX ADMIN — HYDRALAZINE HYDROCHLORIDE 75 MG: 50 TABLET, FILM COATED ORAL at 09:29

## 2019-01-01 RX ADMIN — SODIUM CHLORIDE 500 ML: 9 INJECTION, SOLUTION INTRAVENOUS at 17:02

## 2019-01-01 RX ADMIN — SODIUM CHLORIDE: 9 INJECTION, SOLUTION INTRAVENOUS at 12:05

## 2019-01-01 RX ADMIN — SODIUM CHLORIDE: 9 INJECTION, SOLUTION INTRAVENOUS at 09:43

## 2019-01-01 RX ADMIN — MORPHINE SULFATE 4 MG: 4 INJECTION, SOLUTION INTRAMUSCULAR; INTRAVENOUS at 16:51

## 2019-01-01 RX ADMIN — SUCRALFATE 1 G: 1 TABLET ORAL at 21:22

## 2019-01-01 RX ADMIN — DICLOFENAC 2 G: 10 GEL TOPICAL at 08:14

## 2019-01-01 RX ADMIN — BUPIVACAINE HYDROCHLORIDE 50 MG: 5 INJECTION, SOLUTION EPIDURAL; INTRACAUDAL; PERINEURAL at 09:32

## 2019-01-01 RX ADMIN — HYDRALAZINE HYDROCHLORIDE 75 MG: 50 TABLET, FILM COATED ORAL at 09:03

## 2019-01-01 RX ADMIN — CLOPIDOGREL BISULFATE 75 MG: 75 TABLET ORAL at 08:54

## 2019-01-01 RX ADMIN — SODIUM CHLORIDE 250 ML: 9 INJECTION, SOLUTION INTRAVENOUS at 16:15

## 2019-01-01 RX ADMIN — MORPHINE SULFATE 2 MG: 2 INJECTION, SOLUTION INTRAMUSCULAR; INTRAVENOUS at 10:48

## 2019-01-01 RX ADMIN — INFLUENZA A VIRUS A/BRISBANE/02/2018 IVR-190 (H1N1) ANTIGEN (PROPIOLACTONE INACTIVATED), INFLUENZA A VIRUS A/KANSAS/14/2017 X-327 (H3N2) ANTIGEN (PROPIOLACTONE INACTIVATED), INFLUENZA B VIRUS B/MARYLAND/15/2016 ANTIGEN (PROPIOLACTONE INACTIVATED), INFLUENZA B VIRUS B/PHUKET/3073/2013 BVR-1B ANTIGEN (PROPIOLACTONE INACTIVATED) 0.5 ML: 15; 15; 15; 15 INJECTION, SUSPENSION INTRAMUSCULAR at 12:52

## 2019-01-01 RX ADMIN — CARVEDILOL 25 MG: 25 TABLET, FILM COATED ORAL at 16:57

## 2019-01-01 RX ADMIN — DICLOFENAC 2 G: 10 GEL TOPICAL at 09:46

## 2019-01-01 RX ADMIN — CARVEDILOL 25 MG: 25 TABLET, FILM COATED ORAL at 18:09

## 2019-01-01 RX ADMIN — OXYCODONE HYDROCHLORIDE AND ACETAMINOPHEN 1 TABLET: 5; 325 TABLET ORAL at 22:17

## 2019-01-01 RX ADMIN — APIXABAN 10 MG: 5 TABLET, FILM COATED ORAL at 16:40

## 2019-01-01 RX ADMIN — Medication 10 ML: at 20:51

## 2019-01-01 RX ADMIN — CARVEDILOL 25 MG: 25 TABLET, FILM COATED ORAL at 09:29

## 2019-01-01 RX ADMIN — SIMVASTATIN 40 MG: 40 TABLET, FILM COATED ORAL at 19:27

## 2019-01-01 RX ADMIN — CARVEDILOL 25 MG: 25 TABLET, FILM COATED ORAL at 17:40

## 2019-01-01 RX ADMIN — METOPROLOL TARTRATE 5 MG: 1 INJECTION, SOLUTION INTRAVENOUS at 21:39

## 2019-01-01 RX ADMIN — HYDRALAZINE HYDROCHLORIDE 100 MG: 50 TABLET, FILM COATED ORAL at 10:44

## 2019-01-01 RX ADMIN — FUROSEMIDE 20 MG: 20 TABLET ORAL at 17:42

## 2019-01-01 RX ADMIN — SUCRALFATE 1 G: 1 TABLET ORAL at 11:12

## 2019-01-01 RX ADMIN — HYDRALAZINE HYDROCHLORIDE 75 MG: 50 TABLET, FILM COATED ORAL at 21:36

## 2019-01-01 RX ADMIN — HYDRALAZINE HYDROCHLORIDE 50 MG: 50 TABLET, FILM COATED ORAL at 08:54

## 2019-01-01 RX ADMIN — SIMVASTATIN 40 MG: 40 TABLET, FILM COATED ORAL at 22:47

## 2019-01-01 RX ADMIN — CEFTRIAXONE SODIUM 1 G: 1 INJECTION, POWDER, FOR SOLUTION INTRAMUSCULAR; INTRAVENOUS at 16:32

## 2019-01-01 RX ADMIN — MORPHINE SULFATE 2 MG: 2 INJECTION, SOLUTION INTRAMUSCULAR; INTRAVENOUS at 17:53

## 2019-01-01 RX ADMIN — DICLOFENAC 2 G: 10 GEL TOPICAL at 21:46

## 2019-01-01 RX ADMIN — POLYETHYLENE GLYCOL 3350 17 G: 17 POWDER, FOR SOLUTION ORAL at 14:56

## 2019-01-01 RX ADMIN — CLOPIDOGREL BISULFATE 75 MG: 75 TABLET ORAL at 09:36

## 2019-01-01 RX ADMIN — CARVEDILOL 25 MG: 25 TABLET, FILM COATED ORAL at 10:44

## 2019-01-01 RX ADMIN — POLYETHYLENE GLYCOL 3350 17 G: 17 POWDER, FOR SOLUTION ORAL at 09:42

## 2019-01-01 RX ADMIN — HYDRALAZINE HYDROCHLORIDE 75 MG: 50 TABLET, FILM COATED ORAL at 08:06

## 2019-01-01 RX ADMIN — PANTOPRAZOLE SODIUM 40 MG: 40 TABLET, DELAYED RELEASE ORAL at 23:32

## 2019-01-01 RX ADMIN — CARVEDILOL 25 MG: 25 TABLET, FILM COATED ORAL at 17:28

## 2019-01-01 RX ADMIN — SODIUM CHLORIDE: 9 INJECTION, SOLUTION INTRAVENOUS at 14:23

## 2019-01-01 RX ADMIN — Medication 10 ML: at 19:34

## 2019-01-01 RX ADMIN — PANTOPRAZOLE SODIUM 40 MG: 40 TABLET, DELAYED RELEASE ORAL at 09:51

## 2019-01-01 RX ADMIN — HYDRALAZINE HYDROCHLORIDE 75 MG: 50 TABLET, FILM COATED ORAL at 12:58

## 2019-01-01 RX ADMIN — PANTOPRAZOLE SODIUM 40 MG: 40 TABLET, DELAYED RELEASE ORAL at 09:29

## 2019-01-01 RX ADMIN — CARVEDILOL 25 MG: 25 TABLET, FILM COATED ORAL at 12:58

## 2019-01-01 RX ADMIN — MORPHINE SULFATE 2 MG: 2 INJECTION, SOLUTION INTRAMUSCULAR; INTRAVENOUS at 14:04

## 2019-01-01 RX ADMIN — CLOBETASOL PROPIONATE: 0.5 CREAM TOPICAL at 10:03

## 2019-01-01 RX ADMIN — CARVEDILOL 25 MG: 25 TABLET, FILM COATED ORAL at 18:01

## 2019-01-01 RX ADMIN — SODIUM CHLORIDE: 9 INJECTION, SOLUTION INTRAVENOUS at 22:02

## 2019-01-01 RX ADMIN — MORPHINE SULFATE 4 MG: 4 INJECTION, SOLUTION INTRAMUSCULAR; INTRAVENOUS at 16:44

## 2019-01-01 RX ADMIN — SUCRALFATE 1 G: 1 TABLET ORAL at 20:50

## 2019-01-01 RX ADMIN — SIMVASTATIN 40 MG: 40 TABLET, FILM COATED ORAL at 20:54

## 2019-01-01 RX ADMIN — PANTOPRAZOLE SODIUM 40 MG: 40 TABLET, DELAYED RELEASE ORAL at 07:47

## 2019-01-01 RX ADMIN — ASPIRIN 81 MG 81 MG: 81 TABLET ORAL at 11:14

## 2019-01-01 RX ADMIN — APIXABAN 10 MG: 5 TABLET, FILM COATED ORAL at 08:11

## 2019-01-01 RX ADMIN — SUCRALFATE 1 G: 1 TABLET ORAL at 13:00

## 2019-01-01 RX ADMIN — SUCRALFATE 1 G: 1 TABLET ORAL at 23:54

## 2019-01-01 RX ADMIN — HYDROCODONE BITARTRATE AND ACETAMINOPHEN 1 TABLET: 5; 325 TABLET ORAL at 02:53

## 2019-01-01 RX ADMIN — HYDRALAZINE HYDROCHLORIDE 10 MG: 20 INJECTION INTRAMUSCULAR; INTRAVENOUS at 15:49

## 2019-01-01 RX ADMIN — SIMVASTATIN 40 MG: 40 TABLET, FILM COATED ORAL at 19:26

## 2019-01-01 RX ADMIN — HEPARIN SODIUM 5450 UNITS: 5000 INJECTION INTRAVENOUS; SUBCUTANEOUS at 21:56

## 2019-01-01 RX ADMIN — Medication 10 ML: at 20:54

## 2019-01-01 RX ADMIN — SODIUM CHLORIDE: 9 INJECTION, SOLUTION INTRAVENOUS at 22:01

## 2019-01-01 RX ADMIN — SUCRALFATE 1 G: 1 TABLET ORAL at 07:46

## 2019-01-01 RX ADMIN — MORPHINE SULFATE 2 MG: 2 INJECTION, SOLUTION INTRAMUSCULAR; INTRAVENOUS at 15:49

## 2019-01-01 RX ADMIN — CYANOCOBALAMIN 1000 MCG: 1000 INJECTION, SOLUTION INTRAMUSCULAR at 20:40

## 2019-01-01 RX ADMIN — OXYCODONE HYDROCHLORIDE AND ACETAMINOPHEN 1 TABLET: 5; 325 TABLET ORAL at 06:21

## 2019-01-01 RX ADMIN — Medication 10 ML: at 23:01

## 2019-01-01 RX ADMIN — Medication 10 ML: at 08:11

## 2019-01-01 RX ADMIN — HYDRALAZINE HYDROCHLORIDE 75 MG: 50 TABLET, FILM COATED ORAL at 09:53

## 2019-01-01 RX ADMIN — HYDRALAZINE HYDROCHLORIDE 75 MG: 50 TABLET, FILM COATED ORAL at 11:13

## 2019-01-01 RX ADMIN — POLYETHYLENE GLYCOL 3350 17 G: 17 POWDER, FOR SOLUTION ORAL at 23:33

## 2019-01-01 RX ADMIN — Medication 10 ML: at 01:06

## 2019-01-01 RX ADMIN — CEFTRIAXONE SODIUM 1 G: 1 INJECTION, POWDER, FOR SOLUTION INTRAMUSCULAR; INTRAVENOUS at 16:33

## 2019-01-01 RX ADMIN — CARVEDILOL 25 MG: 25 TABLET, FILM COATED ORAL at 11:15

## 2019-01-01 RX ADMIN — SODIUM CHLORIDE 80 MG: 9 INJECTION, SOLUTION INTRAVENOUS at 17:03

## 2019-01-01 RX ADMIN — PROPOFOL 125 MCG/KG/MIN: 10 INJECTION, EMULSION INTRAVENOUS at 12:12

## 2019-01-01 RX ADMIN — FOLIC ACID 1 MG: 1 TABLET ORAL at 08:33

## 2019-01-01 RX ADMIN — HYDRALAZINE HYDROCHLORIDE 100 MG: 50 TABLET, FILM COATED ORAL at 17:41

## 2019-01-01 RX ADMIN — CARVEDILOL 25 MG: 25 TABLET, FILM COATED ORAL at 09:51

## 2019-01-01 RX ADMIN — ACETAMINOPHEN 650 MG: 325 TABLET, FILM COATED ORAL at 20:57

## 2019-01-01 RX ADMIN — HYDRALAZINE HYDROCHLORIDE 50 MG: 50 TABLET, FILM COATED ORAL at 18:01

## 2019-01-01 RX ADMIN — OXYCODONE HYDROCHLORIDE AND ACETAMINOPHEN 1 TABLET: 5; 325 TABLET ORAL at 09:11

## 2019-01-01 RX ADMIN — POTASSIUM BICARBONATE 40 MEQ: 782 TABLET, EFFERVESCENT ORAL at 12:39

## 2019-01-01 RX ADMIN — Medication 10 ML: at 09:44

## 2019-01-01 RX ADMIN — MORPHINE SULFATE 4 MG: 4 INJECTION, SOLUTION INTRAMUSCULAR; INTRAVENOUS at 22:04

## 2019-01-01 RX ADMIN — SUCRALFATE 1 G: 1 TABLET ORAL at 17:38

## 2019-01-01 RX ADMIN — HYDRALAZINE HYDROCHLORIDE 50 MG: 50 TABLET, FILM COATED ORAL at 12:50

## 2019-01-01 RX ADMIN — SIMVASTATIN 40 MG: 40 TABLET, FILM COATED ORAL at 21:33

## 2019-01-01 RX ADMIN — SIMVASTATIN 40 MG: 40 TABLET, FILM COATED ORAL at 21:41

## 2019-01-01 RX ADMIN — SIMVASTATIN 40 MG: 40 TABLET, FILM COATED ORAL at 20:12

## 2019-01-01 RX ADMIN — SUCRALFATE 1 G: 1 TABLET ORAL at 17:41

## 2019-01-01 RX ADMIN — CARVEDILOL 25 MG: 25 TABLET, FILM COATED ORAL at 19:34

## 2019-01-01 RX ADMIN — ONDANSETRON 4 MG: 2 INJECTION INTRAMUSCULAR; INTRAVENOUS at 16:51

## 2019-01-01 RX ADMIN — POLYETHYLENE GLYCOL 3350 17 G: 17 POWDER, FOR SOLUTION ORAL at 21:38

## 2019-01-01 RX ADMIN — Medication 10 ML: at 21:38

## 2019-01-01 RX ADMIN — DICLOFENAC 2 G: 10 GEL TOPICAL at 09:05

## 2019-01-01 RX ADMIN — SUCRALFATE 1 G: 1 TABLET ORAL at 08:11

## 2019-01-01 RX ADMIN — MORPHINE SULFATE 2 MG: 2 INJECTION, SOLUTION INTRAMUSCULAR; INTRAVENOUS at 14:55

## 2019-01-01 RX ADMIN — Medication 10 ML: at 08:50

## 2019-01-01 RX ADMIN — HYDRALAZINE HYDROCHLORIDE 75 MG: 50 TABLET, FILM COATED ORAL at 22:43

## 2019-01-01 RX ADMIN — CARVEDILOL 25 MG: 25 TABLET, FILM COATED ORAL at 17:38

## 2019-01-01 RX ADMIN — CARVEDILOL 25 MG: 25 TABLET, FILM COATED ORAL at 20:39

## 2019-01-01 RX ADMIN — SUCRALFATE 1 G: 1 TABLET ORAL at 17:40

## 2019-01-01 RX ADMIN — CARVEDILOL 25 MG: 25 TABLET, FILM COATED ORAL at 08:37

## 2019-01-01 RX ADMIN — Medication 10 ML: at 21:09

## 2019-01-01 RX ADMIN — DICLOFENAC 2 G: 10 GEL TOPICAL at 08:50

## 2019-01-01 RX ADMIN — CLOBETASOL PROPIONATE: 0.5 CREAM TOPICAL at 09:30

## 2019-01-01 RX ADMIN — FUROSEMIDE 20 MG: 20 TABLET ORAL at 08:12

## 2019-01-01 RX ADMIN — MORPHINE SULFATE 4 MG: 4 INJECTION, SOLUTION INTRAMUSCULAR; INTRAVENOUS at 17:51

## 2019-01-01 RX ADMIN — HYDRALAZINE HYDROCHLORIDE 25 MG: 25 TABLET, FILM COATED ORAL at 13:30

## 2019-01-01 RX ADMIN — FOLIC ACID 1 MG: 1 TABLET ORAL at 08:11

## 2019-01-01 RX ADMIN — CYANOCOBALAMIN 1000 MCG: 1000 INJECTION, SOLUTION INTRAMUSCULAR at 08:33

## 2019-01-01 RX ADMIN — HYDRALAZINE HYDROCHLORIDE 75 MG: 50 TABLET, FILM COATED ORAL at 11:30

## 2019-01-01 RX ADMIN — CARVEDILOL 25 MG: 25 TABLET, FILM COATED ORAL at 09:03

## 2019-01-01 RX ADMIN — CARVEDILOL 25 MG: 25 TABLET, FILM COATED ORAL at 16:37

## 2019-01-01 RX ADMIN — CLOBETASOL PROPIONATE: 0.5 CREAM TOPICAL at 09:55

## 2019-01-01 RX ADMIN — MORPHINE SULFATE 4 MG: 4 INJECTION, SOLUTION INTRAMUSCULAR; INTRAVENOUS at 19:27

## 2019-01-01 RX ADMIN — Medication 10 ML: at 09:41

## 2019-01-01 RX ADMIN — CLOBETASOL PROPIONATE: 0.5 CREAM TOPICAL at 20:51

## 2019-01-01 RX ADMIN — CARVEDILOL 25 MG: 25 TABLET, FILM COATED ORAL at 09:36

## 2019-01-01 RX ADMIN — FUROSEMIDE 20 MG: 20 TABLET ORAL at 20:19

## 2019-01-01 RX ADMIN — CARVEDILOL 25 MG: 25 TABLET, FILM COATED ORAL at 08:06

## 2019-01-01 RX ADMIN — SUCRALFATE 1 G: 1 TABLET ORAL at 11:21

## 2019-01-01 RX ADMIN — SUCRALFATE 1 G: 1 TABLET ORAL at 12:16

## 2019-01-01 RX ADMIN — HYDRALAZINE HYDROCHLORIDE 100 MG: 50 TABLET, FILM COATED ORAL at 09:04

## 2019-01-01 RX ADMIN — PANTOPRAZOLE SODIUM 40 MG: 40 TABLET, DELAYED RELEASE ORAL at 11:47

## 2019-01-01 RX ADMIN — CARVEDILOL 25 MG: 25 TABLET, FILM COATED ORAL at 10:08

## 2019-01-01 RX ADMIN — PANTOPRAZOLE SODIUM 40 MG: 40 TABLET, DELAYED RELEASE ORAL at 09:53

## 2019-01-01 RX ADMIN — HYDRALAZINE HYDROCHLORIDE 75 MG: 50 TABLET, FILM COATED ORAL at 20:15

## 2019-01-01 RX ADMIN — CARVEDILOL 25 MG: 25 TABLET, FILM COATED ORAL at 16:53

## 2019-01-01 RX ADMIN — SUCRALFATE 1 G: 1 TABLET ORAL at 16:21

## 2019-01-01 RX ADMIN — POLYETHYLENE GLYCOL 3350 17 G: 17 POWDER, FOR SOLUTION ORAL at 21:36

## 2019-01-01 RX ADMIN — Medication 10 ML: at 21:11

## 2019-01-01 RX ADMIN — HYDRALAZINE HYDROCHLORIDE 10 MG: 20 INJECTION INTRAMUSCULAR; INTRAVENOUS at 17:05

## 2019-01-01 RX ADMIN — SIMVASTATIN 40 MG: 40 TABLET, FILM COATED ORAL at 21:46

## 2019-01-01 RX ADMIN — MORPHINE SULFATE 1 MG: 2 INJECTION, SOLUTION INTRAMUSCULAR; INTRAVENOUS at 05:21

## 2019-01-01 RX ADMIN — FUROSEMIDE 20 MG: 10 INJECTION, SOLUTION INTRAMUSCULAR; INTRAVENOUS at 17:27

## 2019-01-01 RX ADMIN — SODIUM CHLORIDE: 9 INJECTION, SOLUTION INTRAVENOUS at 18:55

## 2019-01-01 RX ADMIN — Medication 10 ML: at 19:26

## 2019-01-01 RX ADMIN — CARVEDILOL 25 MG: 25 TABLET, FILM COATED ORAL at 13:30

## 2019-01-01 RX ADMIN — DICLOFENAC 2 G: 10 GEL TOPICAL at 21:06

## 2019-01-01 RX ADMIN — SODIUM CHLORIDE: 9 INJECTION, SOLUTION INTRAVENOUS at 04:24

## 2019-01-01 RX ADMIN — Medication 10 ML: at 09:45

## 2019-01-01 RX ADMIN — SUCRALFATE 1 G: 1 TABLET ORAL at 08:52

## 2019-01-01 RX ADMIN — CLOBETASOL PROPIONATE: 0.5 CREAM TOPICAL at 21:38

## 2019-01-01 RX ADMIN — SUCRALFATE 1 G: 1 TABLET ORAL at 09:29

## 2019-01-01 RX ADMIN — CLOBETASOL PROPIONATE: 0.5 CREAM TOPICAL at 21:00

## 2019-01-01 RX ADMIN — ASPIRIN 81 MG 81 MG: 81 TABLET ORAL at 09:36

## 2019-01-01 RX ADMIN — CARVEDILOL 25 MG: 25 TABLET, FILM COATED ORAL at 17:09

## 2019-01-01 RX ADMIN — FUROSEMIDE 20 MG: 20 TABLET ORAL at 21:33

## 2019-01-01 RX ADMIN — CARVEDILOL 25 MG: 25 TABLET, FILM COATED ORAL at 22:12

## 2019-01-01 RX ADMIN — SIMVASTATIN 40 MG: 40 TABLET, FILM COATED ORAL at 20:57

## 2019-01-01 RX ADMIN — SUCRALFATE 1 G: 1 TABLET ORAL at 17:34

## 2019-01-01 RX ADMIN — DICLOFENAC 2 G: 10 GEL TOPICAL at 09:55

## 2019-01-01 RX ADMIN — HEPARIN SODIUM AND DEXTROSE 17 UNITS/KG/HR: 10000; 5 INJECTION INTRAVENOUS at 17:38

## 2019-01-01 RX ADMIN — HYDRALAZINE HYDROCHLORIDE 75 MG: 50 TABLET, FILM COATED ORAL at 20:57

## 2019-01-01 RX ADMIN — CLOBETASOL PROPIONATE: 0.5 CREAM TOPICAL at 14:35

## 2019-01-01 RX ADMIN — HYDRALAZINE HYDROCHLORIDE 10 MG: 20 INJECTION INTRAMUSCULAR; INTRAVENOUS at 22:55

## 2019-01-01 RX ADMIN — ASPIRIN 81 MG 81 MG: 81 TABLET ORAL at 12:32

## 2019-01-01 RX ADMIN — SUCRALFATE 1 G: 1 TABLET ORAL at 07:11

## 2019-01-01 RX ADMIN — HYDRALAZINE HYDROCHLORIDE 75 MG: 50 TABLET, FILM COATED ORAL at 20:50

## 2019-01-01 RX ADMIN — SUCRALFATE 1 G: 1 TABLET ORAL at 17:28

## 2019-01-01 RX ADMIN — CARVEDILOL 25 MG: 25 TABLET, FILM COATED ORAL at 09:42

## 2019-01-01 RX ADMIN — SUCRALFATE 1 G: 1 TABLET ORAL at 16:27

## 2019-01-01 RX ADMIN — MORPHINE SULFATE 2 MG: 2 INJECTION, SOLUTION INTRAMUSCULAR; INTRAVENOUS at 22:24

## 2019-01-01 RX ADMIN — POLYETHYLENE GLYCOL 3350, SODIUM SULFATE ANHYDROUS, SODIUM BICARBONATE, SODIUM CHLORIDE, POTASSIUM CHLORIDE 2000 ML: 236; 22.74; 6.74; 5.86; 2.97 POWDER, FOR SOLUTION ORAL at 00:28

## 2019-01-01 RX ADMIN — HYDROCODONE BITARTRATE AND ACETAMINOPHEN 1 TABLET: 5; 325 TABLET ORAL at 12:41

## 2019-01-01 RX ADMIN — HYDRALAZINE HYDROCHLORIDE 10 MG: 20 INJECTION INTRAMUSCULAR; INTRAVENOUS at 08:34

## 2019-01-01 RX ADMIN — FUROSEMIDE 20 MG: 20 TABLET ORAL at 20:59

## 2019-01-01 RX ADMIN — SUCRALFATE 1 G: 1 TABLET ORAL at 17:08

## 2019-01-01 RX ADMIN — HYDRALAZINE HYDROCHLORIDE 75 MG: 50 TABLET, FILM COATED ORAL at 09:36

## 2019-01-01 RX ADMIN — Medication 10 ML: at 21:52

## 2019-01-01 RX ADMIN — CLOPIDOGREL BISULFATE 75 MG: 75 TABLET ORAL at 08:12

## 2019-01-01 RX ADMIN — MORPHINE SULFATE 2 MG: 2 INJECTION, SOLUTION INTRAMUSCULAR; INTRAVENOUS at 18:34

## 2019-01-01 RX ADMIN — SIMVASTATIN 40 MG: 40 TABLET, FILM COATED ORAL at 20:29

## 2019-01-01 RX ADMIN — LIDOCAINE HYDROCHLORIDE 50 MG: 10 INJECTION, SOLUTION EPIDURAL; INFILTRATION; INTRACAUDAL; PERINEURAL at 12:12

## 2019-01-01 RX ADMIN — CLOBETASOL PROPIONATE: 0.5 CREAM TOPICAL at 21:06

## 2019-01-01 RX ADMIN — HYDRALAZINE HYDROCHLORIDE 75 MG: 50 TABLET, FILM COATED ORAL at 02:53

## 2019-01-01 RX ADMIN — Medication 10 ML: at 21:04

## 2019-01-01 RX ADMIN — Medication 10 ML: at 09:18

## 2019-01-01 RX ADMIN — ENOXAPARIN SODIUM 40 MG: 40 INJECTION SUBCUTANEOUS at 08:12

## 2019-01-01 RX ADMIN — CARVEDILOL 25 MG: 25 TABLET, FILM COATED ORAL at 08:12

## 2019-01-01 RX ADMIN — SUCRALFATE 1 G: 1 TABLET ORAL at 22:47

## 2019-01-01 RX ADMIN — CLOBETASOL PROPIONATE: 0.5 CREAM TOPICAL at 09:46

## 2019-01-01 RX ADMIN — SODIUM CHLORIDE: 9 INJECTION, SOLUTION INTRAVENOUS at 18:25

## 2019-01-01 RX ADMIN — HEPARIN SODIUM AND DEXTROSE 18 UNITS/KG/HR: 10000; 5 INJECTION INTRAVENOUS at 22:43

## 2019-01-01 RX ADMIN — SODIUM CHLORIDE: 9 INJECTION, SOLUTION INTRAVENOUS at 04:18

## 2019-01-01 RX ADMIN — MORPHINE SULFATE 2 MG: 2 INJECTION, SOLUTION INTRAMUSCULAR; INTRAVENOUS at 02:46

## 2019-01-01 RX ADMIN — Medication 10 ML: at 09:00

## 2019-01-01 RX ADMIN — HYDRALAZINE HYDROCHLORIDE 75 MG: 50 TABLET, FILM COATED ORAL at 08:51

## 2019-01-01 RX ADMIN — PANTOPRAZOLE SODIUM 40 MG: 40 TABLET, DELAYED RELEASE ORAL at 08:32

## 2019-01-01 RX ADMIN — DOCUSATE SODIUM 100 MG: 100 CAPSULE, LIQUID FILLED ORAL at 14:56

## 2019-01-01 RX ADMIN — CLOBETASOL PROPIONATE: 0.5 CREAM TOPICAL at 20:50

## 2019-01-01 RX ADMIN — HYDRALAZINE HYDROCHLORIDE 75 MG: 50 TABLET, FILM COATED ORAL at 11:47

## 2019-01-01 RX ADMIN — SUCRALFATE 1 G: 1 TABLET ORAL at 18:54

## 2019-01-01 RX ADMIN — POLYETHYLENE GLYCOL 3350 17 G: 17 POWDER, FOR SOLUTION ORAL at 20:57

## 2019-01-01 RX ADMIN — SODIUM CHLORIDE: 9 INJECTION, SOLUTION INTRAVENOUS at 20:32

## 2019-01-01 RX ADMIN — HYDRALAZINE HYDROCHLORIDE 50 MG: 50 TABLET, FILM COATED ORAL at 08:12

## 2019-01-01 RX ADMIN — DICLOFENAC 2 G: 10 GEL TOPICAL at 20:51

## 2019-01-01 RX ADMIN — SUCRALFATE 1 G: 1 TABLET ORAL at 12:21

## 2019-01-01 RX ADMIN — SUCRALFATE 1 G: 1 TABLET ORAL at 11:30

## 2019-01-01 RX ADMIN — SUCRALFATE 1 G: 1 TABLET ORAL at 21:36

## 2019-01-01 RX ADMIN — PANTOPRAZOLE SODIUM 40 MG: 40 TABLET, DELAYED RELEASE ORAL at 08:55

## 2019-01-01 RX ADMIN — CLOPIDOGREL BISULFATE 75 MG: 75 TABLET ORAL at 09:04

## 2019-01-01 RX ADMIN — SODIUM CHLORIDE: 9 INJECTION, SOLUTION INTRAVENOUS at 13:17

## 2019-01-01 RX ADMIN — SIMVASTATIN 40 MG: 40 TABLET, FILM COATED ORAL at 21:22

## 2019-01-01 RX ADMIN — SUCRALFATE 1 G: 1 TABLET ORAL at 14:56

## 2019-01-01 RX ADMIN — MORPHINE SULFATE 2 MG: 2 INJECTION, SOLUTION INTRAMUSCULAR; INTRAVENOUS at 21:21

## 2019-01-01 RX ADMIN — MORPHINE SULFATE 2 MG: 2 INJECTION, SOLUTION INTRAMUSCULAR; INTRAVENOUS at 12:09

## 2019-01-01 RX ADMIN — CARVEDILOL 25 MG: 25 TABLET, FILM COATED ORAL at 07:46

## 2019-01-01 RX ADMIN — SIMVASTATIN 40 MG: 40 TABLET, FILM COATED ORAL at 22:10

## 2019-01-01 RX ADMIN — ENOXAPARIN SODIUM 30 MG: 30 INJECTION, SOLUTION INTRAVENOUS; SUBCUTANEOUS at 09:52

## 2019-01-01 RX ADMIN — POLYETHYLENE GLYCOL 3350 250 G: 17 POWDER, FOR SOLUTION ORAL at 16:16

## 2019-01-01 RX ADMIN — CEFTRIAXONE SODIUM 1 G: 1 INJECTION, POWDER, FOR SOLUTION INTRAMUSCULAR; INTRAVENOUS at 17:04

## 2019-01-01 RX ADMIN — Medication 10 ML: at 22:18

## 2019-01-01 RX ADMIN — Medication 10 ML: at 08:55

## 2019-01-01 RX ADMIN — FUROSEMIDE 20 MG: 20 TABLET ORAL at 12:50

## 2019-01-01 RX ADMIN — HYDRALAZINE HYDROCHLORIDE 75 MG: 50 TABLET, FILM COATED ORAL at 22:10

## 2019-01-01 RX ADMIN — CLOBETASOL PROPIONATE: 0.5 CREAM TOPICAL at 09:04

## 2019-01-01 RX ADMIN — HYDRALAZINE HYDROCHLORIDE 75 MG: 50 TABLET, FILM COATED ORAL at 21:22

## 2019-01-01 RX ADMIN — HYDRALAZINE HYDROCHLORIDE 75 MG: 50 TABLET, FILM COATED ORAL at 09:42

## 2019-01-01 RX ADMIN — CLOBETASOL PROPIONATE: 0.5 CREAM TOPICAL at 08:14

## 2019-01-01 RX ADMIN — SUCRALFATE 1 G: 1 TABLET ORAL at 17:10

## 2019-01-01 RX ADMIN — SODIUM CHLORIDE: 9 INJECTION, SOLUTION INTRAVENOUS at 03:27

## 2019-01-01 RX ADMIN — SUCRALFATE 1 G: 1 TABLET ORAL at 13:22

## 2019-01-01 RX ADMIN — FUROSEMIDE 20 MG: 20 TABLET ORAL at 20:47

## 2019-01-01 RX ADMIN — SUCRALFATE 1 G: 1 TABLET ORAL at 19:33

## 2019-01-01 RX ADMIN — SUCRALFATE 1 G: 1 TABLET ORAL at 09:42

## 2019-01-01 RX ADMIN — PANTOPRAZOLE SODIUM 40 MG: 40 TABLET, DELAYED RELEASE ORAL at 09:04

## 2019-01-01 RX ADMIN — SUCRALFATE 1 G: 1 TABLET ORAL at 09:51

## 2019-01-01 RX ADMIN — ACETAMINOPHEN 650 MG: 325 TABLET, FILM COATED ORAL at 21:11

## 2019-01-01 RX ADMIN — MORPHINE SULFATE 2 MG: 2 INJECTION, SOLUTION INTRAMUSCULAR; INTRAVENOUS at 09:27

## 2019-01-01 RX ADMIN — SODIUM CHLORIDE: 9 INJECTION, SOLUTION INTRAVENOUS at 00:36

## 2019-01-01 RX ADMIN — DICLOFENAC 2 G: 10 GEL TOPICAL at 10:49

## 2019-01-01 RX ADMIN — HYDRALAZINE HYDROCHLORIDE 10 MG: 20 INJECTION INTRAMUSCULAR; INTRAVENOUS at 09:14

## 2019-01-01 RX ADMIN — POLYETHYLENE GLYCOL 3350 17 G: 17 POWDER, FOR SOLUTION ORAL at 22:22

## 2019-01-01 RX ADMIN — Medication 10 ML: at 20:26

## 2019-01-01 RX ADMIN — Medication 10 ML: at 07:48

## 2019-01-01 RX ADMIN — Medication 10 ML: at 08:37

## 2019-01-01 RX ADMIN — FUROSEMIDE 20 MG: 20 TABLET ORAL at 10:45

## 2019-01-01 RX ADMIN — SODIUM CHLORIDE: 9 INJECTION, SOLUTION INTRAVENOUS at 17:38

## 2019-01-01 RX ADMIN — PANTOPRAZOLE SODIUM 40 MG: 40 TABLET, DELAYED RELEASE ORAL at 08:37

## 2019-01-01 RX ADMIN — SODIUM CHLORIDE: 9 INJECTION, SOLUTION INTRAVENOUS at 17:51

## 2019-01-01 RX ADMIN — ENOXAPARIN SODIUM 40 MG: 40 INJECTION SUBCUTANEOUS at 12:51

## 2019-01-01 RX ADMIN — OXYCODONE HYDROCHLORIDE AND ACETAMINOPHEN 1 TABLET: 5; 325 TABLET ORAL at 09:41

## 2019-01-01 RX ADMIN — MAGNESIUM SULFATE HEPTAHYDRATE 2 G: 500 INJECTION, SOLUTION INTRAMUSCULAR; INTRAVENOUS at 08:56

## 2019-01-01 RX ADMIN — SUCRALFATE 1 G: 1 TABLET ORAL at 16:58

## 2019-01-01 RX ADMIN — SUCRALFATE 1 G: 1 TABLET ORAL at 13:45

## 2019-01-01 RX ADMIN — IOVERSOL 150 ML: 678 INJECTION INTRA-ARTERIAL; INTRAVENOUS at 10:44

## 2019-01-01 RX ADMIN — CLOPIDOGREL BISULFATE 75 MG: 75 TABLET ORAL at 08:13

## 2019-01-01 RX ADMIN — Medication 10 ML: at 09:43

## 2019-01-01 RX ADMIN — POLYETHYLENE GLYCOL 3350, SODIUM SULFATE ANHYDROUS, SODIUM BICARBONATE, SODIUM CHLORIDE, POTASSIUM CHLORIDE 2000 ML: 236; 22.74; 6.74; 5.86; 2.97 POWDER, FOR SOLUTION ORAL at 16:57

## 2019-01-01 RX ADMIN — PANTOPRAZOLE SODIUM 40 MG: 40 TABLET, DELAYED RELEASE ORAL at 08:14

## 2019-01-01 RX ADMIN — CARVEDILOL 25 MG: 25 TABLET, FILM COATED ORAL at 09:53

## 2019-01-01 RX ADMIN — Medication 10 ML: at 08:33

## 2019-01-01 RX ADMIN — CARVEDILOL 25 MG: 25 TABLET, FILM COATED ORAL at 17:08

## 2019-01-01 RX ADMIN — HYDRALAZINE HYDROCHLORIDE 100 MG: 50 TABLET, FILM COATED ORAL at 18:44

## 2019-01-01 RX ADMIN — FUROSEMIDE 20 MG: 20 TABLET ORAL at 09:04

## 2019-01-01 RX ADMIN — CLOBETASOL PROPIONATE: 0.5 CREAM TOPICAL at 15:55

## 2019-01-01 RX ADMIN — OXYCODONE HYDROCHLORIDE AND ACETAMINOPHEN 1 TABLET: 5; 325 TABLET ORAL at 08:52

## 2019-01-01 RX ADMIN — Medication 10 ML: at 08:12

## 2019-01-01 RX ADMIN — SUCRALFATE 1 G: 1 TABLET ORAL at 20:57

## 2019-01-01 RX ADMIN — PANTOPRAZOLE SODIUM 40 MG: 40 TABLET, DELAYED RELEASE ORAL at 09:42

## 2019-01-01 RX ADMIN — SUCRALFATE 1 G: 1 TABLET ORAL at 16:37

## 2019-01-01 RX ADMIN — MORPHINE SULFATE 2 MG: 2 INJECTION, SOLUTION INTRAMUSCULAR; INTRAVENOUS at 06:29

## 2019-01-01 RX ADMIN — SIMVASTATIN 40 MG: 40 TABLET, FILM COATED ORAL at 20:59

## 2019-01-01 RX ADMIN — HYDRALAZINE HYDROCHLORIDE 50 MG: 50 TABLET, FILM COATED ORAL at 18:09

## 2019-01-01 RX ADMIN — SUCRALFATE 1 G: 1 TABLET ORAL at 01:54

## 2019-01-01 RX ADMIN — SIMVASTATIN 40 MG: 40 TABLET, FILM COATED ORAL at 20:50

## 2019-01-01 RX ADMIN — HYDRALAZINE HYDROCHLORIDE 25 MG: 20 INJECTION INTRAMUSCULAR; INTRAVENOUS at 02:54

## 2019-01-01 RX ADMIN — MAGNESIUM HYDROXIDE 30 ML: 400 SUSPENSION ORAL at 17:53

## 2019-01-01 RX ADMIN — SUCRALFATE 1 G: 1 TABLET ORAL at 19:30

## 2019-01-01 RX ADMIN — CEFTRIAXONE SODIUM 1 G: 1 INJECTION, POWDER, FOR SOLUTION INTRAMUSCULAR; INTRAVENOUS at 16:38

## 2019-01-01 RX ADMIN — ENOXAPARIN SODIUM 40 MG: 40 INJECTION SUBCUTANEOUS at 09:04

## 2019-01-01 RX ADMIN — POLYETHYLENE GLYCOL 3350 17 G: 17 POWDER, FOR SOLUTION ORAL at 21:00

## 2019-01-01 RX ADMIN — SUCRALFATE 1 G: 1 TABLET ORAL at 08:32

## 2019-01-01 RX ADMIN — SUCRALFATE 1 G: 1 TABLET ORAL at 09:03

## 2019-01-01 RX ADMIN — HYDRALAZINE HYDROCHLORIDE 75 MG: 50 TABLET, FILM COATED ORAL at 07:47

## 2019-01-01 RX ADMIN — SUCRALFATE 1 G: 1 TABLET ORAL at 22:10

## 2019-01-01 RX ADMIN — CEFTRIAXONE SODIUM 1 G: 1 INJECTION, POWDER, FOR SOLUTION INTRAMUSCULAR; INTRAVENOUS at 17:52

## 2019-01-01 RX ADMIN — SUCRALFATE 1 G: 1 TABLET ORAL at 08:37

## 2019-01-01 RX ADMIN — PROPOFOL 80 MG: 10 INJECTION, EMULSION INTRAVENOUS at 12:12

## 2019-01-01 RX ADMIN — CARVEDILOL 25 MG: 25 TABLET, FILM COATED ORAL at 11:30

## 2019-01-01 RX ADMIN — PANTOPRAZOLE SODIUM 40 MG: 40 TABLET, DELAYED RELEASE ORAL at 10:08

## 2019-01-01 RX ADMIN — SIMVASTATIN 40 MG: 40 TABLET, FILM COATED ORAL at 21:06

## 2019-01-01 RX ADMIN — OXYCODONE HYDROCHLORIDE AND ACETAMINOPHEN 1 TABLET: 5; 325 TABLET ORAL at 01:56

## 2019-01-01 RX ADMIN — CARVEDILOL 25 MG: 25 TABLET, FILM COATED ORAL at 08:32

## 2019-01-01 RX ADMIN — SIMVASTATIN 40 MG: 40 TABLET, FILM COATED ORAL at 21:36

## 2019-01-01 RX ADMIN — ACETAMINOPHEN 650 MG: 325 TABLET, FILM COATED ORAL at 14:14

## 2019-01-01 RX ADMIN — CEFTRIAXONE SODIUM 1 G: 1 INJECTION, POWDER, FOR SOLUTION INTRAMUSCULAR; INTRAVENOUS at 20:25

## 2019-01-01 RX ADMIN — CARVEDILOL 25 MG: 25 TABLET, FILM COATED ORAL at 16:33

## 2019-01-01 RX ADMIN — MORPHINE SULFATE 2 MG: 2 INJECTION, SOLUTION INTRAMUSCULAR; INTRAVENOUS at 09:13

## 2019-01-01 RX ADMIN — Medication 10 ML: at 20:22

## 2019-01-01 RX ADMIN — SUCRALFATE 1 G: 1 TABLET ORAL at 18:17

## 2019-01-01 RX ADMIN — Medication 10 ML: at 10:46

## 2019-01-01 RX ADMIN — FUROSEMIDE 20 MG: 20 TABLET ORAL at 08:54

## 2019-01-01 RX ADMIN — Medication 10 ML: at 20:47

## 2019-01-01 RX ADMIN — ENOXAPARIN SODIUM 40 MG: 40 INJECTION, SOLUTION INTRAVENOUS; SUBCUTANEOUS at 09:48

## 2019-01-01 RX ADMIN — Medication 10 ML: at 20:17

## 2019-01-01 RX ADMIN — Medication 10 ML: at 21:39

## 2019-01-01 RX ADMIN — METOPROLOL TARTRATE 5 MG: 1 INJECTION, SOLUTION INTRAVENOUS at 22:56

## 2019-01-01 RX ADMIN — SUCRALFATE 1 G: 1 TABLET ORAL at 06:00

## 2019-01-01 RX ADMIN — SUCRALFATE 1 G: 1 TABLET ORAL at 19:27

## 2019-01-01 RX ADMIN — CARVEDILOL 25 MG: 25 TABLET, FILM COATED ORAL at 18:17

## 2019-01-01 RX ADMIN — CARVEDILOL 25 MG: 25 TABLET, FILM COATED ORAL at 08:54

## 2019-01-01 RX ADMIN — MORPHINE SULFATE 2 MG: 2 INJECTION, SOLUTION INTRAMUSCULAR; INTRAVENOUS at 10:52

## 2019-01-01 RX ADMIN — SIMVASTATIN 40 MG: 40 TABLET, FILM COATED ORAL at 20:20

## 2019-01-01 RX ADMIN — Medication 10 ML: at 20:32

## 2019-01-01 RX ADMIN — HYDRALAZINE HYDROCHLORIDE 50 MG: 50 TABLET, FILM COATED ORAL at 16:53

## 2019-01-01 RX ADMIN — SUCRALFATE 1 G: 1 TABLET ORAL at 09:53

## 2019-01-01 RX ADMIN — SODIUM CHLORIDE: 9 INJECTION, SOLUTION INTRAVENOUS at 14:55

## 2019-01-01 RX ADMIN — MORPHINE SULFATE 2 MG: 2 INJECTION, SOLUTION INTRAMUSCULAR; INTRAVENOUS at 22:50

## 2019-01-01 RX ADMIN — ENOXAPARIN SODIUM 40 MG: 40 INJECTION, SOLUTION INTRAVENOUS; SUBCUTANEOUS at 09:54

## 2019-01-01 RX ADMIN — TRIAMCINOLONE ACETONIDE 40 MG: 40 INJECTION, SUSPENSION INTRA-ARTICULAR; INTRAMUSCULAR at 09:34

## 2019-01-01 RX ADMIN — PANTOPRAZOLE SODIUM 40 MG: 40 TABLET, DELAYED RELEASE ORAL at 12:50

## 2019-01-01 RX ADMIN — SUCRALFATE 1 G: 1 TABLET ORAL at 12:33

## 2019-01-01 RX ADMIN — DICLOFENAC 2 G: 10 GEL TOPICAL at 21:04

## 2019-01-01 RX ADMIN — CARVEDILOL 25 MG: 25 TABLET, FILM COATED ORAL at 17:34

## 2019-01-01 RX ADMIN — SIMVASTATIN 40 MG: 40 TABLET, FILM COATED ORAL at 20:47

## 2019-01-01 RX ADMIN — SUCRALFATE 1 G: 1 TABLET ORAL at 20:29

## 2019-01-01 RX ADMIN — OXYCODONE HYDROCHLORIDE AND ACETAMINOPHEN 1 TABLET: 5; 325 TABLET ORAL at 18:19

## 2019-01-01 RX ADMIN — SUCRALFATE 1 G: 1 TABLET ORAL at 20:12

## 2019-01-01 RX ADMIN — ENOXAPARIN SODIUM 40 MG: 40 INJECTION SUBCUTANEOUS at 08:55

## 2019-01-01 RX ADMIN — SODIUM CHLORIDE: 9 INJECTION, SOLUTION INTRAVENOUS at 07:43

## 2019-01-01 RX ADMIN — DICLOFENAC 2 G: 10 GEL TOPICAL at 16:01

## 2019-01-01 RX ADMIN — SODIUM CHLORIDE 250 ML: 9 INJECTION, SOLUTION INTRAVENOUS at 15:26

## 2019-01-01 RX ADMIN — MORPHINE SULFATE 2 MG: 2 INJECTION, SOLUTION INTRAMUSCULAR; INTRAVENOUS at 01:38

## 2019-01-01 RX ADMIN — FOLIC ACID 1 MG: 1 TABLET ORAL at 20:41

## 2019-01-01 RX ADMIN — HYDRALAZINE HYDROCHLORIDE 10 MG: 20 INJECTION INTRAMUSCULAR; INTRAVENOUS at 00:36

## 2019-01-01 RX ADMIN — CARVEDILOL 25 MG: 25 TABLET, FILM COATED ORAL at 21:46

## 2019-01-01 RX ADMIN — SUCRALFATE 1 G: 1 TABLET ORAL at 11:47

## 2019-01-01 RX ADMIN — Medication 10 ML: at 20:12

## 2019-01-01 RX ADMIN — ENOXAPARIN SODIUM 30 MG: 30 INJECTION, SOLUTION INTRAVENOUS; SUBCUTANEOUS at 09:36

## 2019-01-01 RX ADMIN — CARVEDILOL 25 MG: 25 TABLET, FILM COATED ORAL at 08:11

## 2019-01-01 RX ADMIN — HEPARIN SODIUM 2700 UNITS: 5000 INJECTION, SOLUTION INTRAVENOUS; SUBCUTANEOUS at 04:27

## 2019-01-01 RX ADMIN — DICLOFENAC 2 G: 10 GEL TOPICAL at 21:37

## 2019-01-01 RX ADMIN — MORPHINE SULFATE 2 MG: 2 INJECTION, SOLUTION INTRAMUSCULAR; INTRAVENOUS at 13:57

## 2019-01-01 RX ADMIN — OXYCODONE HYDROCHLORIDE AND ACETAMINOPHEN 1 TABLET: 5; 325 TABLET ORAL at 07:47

## 2019-01-01 RX ADMIN — Medication 10 ML: at 18:02

## 2019-01-01 RX ADMIN — SUCRALFATE 1 G: 1 TABLET ORAL at 20:54

## 2019-01-01 RX ADMIN — CARVEDILOL 25 MG: 25 TABLET, FILM COATED ORAL at 08:52

## 2019-01-01 RX ADMIN — MORPHINE SULFATE 1 MG: 2 INJECTION, SOLUTION INTRAMUSCULAR; INTRAVENOUS at 22:43

## 2019-01-01 RX ADMIN — CARVEDILOL 25 MG: 25 TABLET, FILM COATED ORAL at 08:13

## 2019-01-01 RX ADMIN — CARVEDILOL 25 MG: 25 TABLET, FILM COATED ORAL at 08:50

## 2019-01-01 RX ADMIN — HYDRALAZINE HYDROCHLORIDE 10 MG: 20 INJECTION INTRAMUSCULAR; INTRAVENOUS at 05:37

## 2019-01-01 RX ADMIN — CARVEDILOL 25 MG: 25 TABLET, FILM COATED ORAL at 16:58

## 2019-01-01 RX ADMIN — CLOPIDOGREL BISULFATE 75 MG: 75 TABLET ORAL at 12:50

## 2019-01-01 RX ADMIN — ENOXAPARIN SODIUM 40 MG: 40 INJECTION, SOLUTION INTRAVENOUS; SUBCUTANEOUS at 21:46

## 2019-01-01 RX ADMIN — Medication 10 ML: at 08:13

## 2019-01-01 RX ADMIN — DICLOFENAC 2 G: 10 GEL TOPICAL at 08:55

## 2019-01-01 RX ADMIN — HEPARIN SODIUM AND DEXTROSE 10 UNITS/KG/HR: 10000; 5 INJECTION INTRAVENOUS at 06:10

## 2019-01-01 RX ADMIN — Medication 10 ML: at 21:46

## 2019-01-01 RX ADMIN — CYANOCOBALAMIN 1000 MCG: 1000 INJECTION, SOLUTION INTRAMUSCULAR at 08:11

## 2019-01-01 RX ADMIN — CLOPIDOGREL BISULFATE 75 MG: 75 TABLET ORAL at 11:14

## 2019-01-01 RX ADMIN — Medication 10 ML: at 22:44

## 2019-01-01 RX ADMIN — PANTOPRAZOLE SODIUM 40 MG: 40 TABLET, DELAYED RELEASE ORAL at 08:52

## 2019-01-01 RX ADMIN — SUCRALFATE 1 G: 1 TABLET ORAL at 21:06

## 2019-01-01 RX ADMIN — CEFTRIAXONE SODIUM 1 G: 1 INJECTION, POWDER, FOR SOLUTION INTRAMUSCULAR; INTRAVENOUS at 20:22

## 2019-01-01 RX ADMIN — CARVEDILOL 25 MG: 25 TABLET, FILM COATED ORAL at 19:30

## 2019-01-01 RX ADMIN — PANTOPRAZOLE SODIUM 40 MG: 40 TABLET, DELAYED RELEASE ORAL at 10:45

## 2019-01-01 RX ADMIN — SUCRALFATE 1 G: 1 TABLET ORAL at 16:32

## 2019-01-01 RX ADMIN — SODIUM CHLORIDE 250 ML: 9 INJECTION, SOLUTION INTRAVENOUS at 10:53

## 2019-01-01 RX ADMIN — HYDRALAZINE HYDROCHLORIDE 10 MG: 20 INJECTION INTRAMUSCULAR; INTRAVENOUS at 08:38

## 2019-01-01 RX ADMIN — Medication 10 ML: at 09:55

## 2019-01-01 RX ADMIN — HYDRALAZINE HYDROCHLORIDE 10 MG: 20 INJECTION INTRAMUSCULAR; INTRAVENOUS at 20:16

## 2019-01-01 RX ADMIN — SUCRALFATE 1 G: 1 TABLET ORAL at 16:33

## 2019-01-01 RX ADMIN — ACETAMINOPHEN 500 MG: 500 TABLET, FILM COATED ORAL at 15:03

## 2019-01-01 RX ADMIN — HYDRALAZINE HYDROCHLORIDE 75 MG: 50 TABLET, FILM COATED ORAL at 21:06

## 2019-01-01 ASSESSMENT — PAIN SCALES - WONG BAKER
WONGBAKER_NUMERICALRESPONSE: 0;4
WONGBAKER_NUMERICALRESPONSE: 0
WONGBAKER_NUMERICALRESPONSE: 0;4
WONGBAKER_NUMERICALRESPONSE: 0;4
WONGBAKER_NUMERICALRESPONSE: 6
WONGBAKER_NUMERICALRESPONSE: 0;4
WONGBAKER_NUMERICALRESPONSE: 0
WONGBAKER_NUMERICALRESPONSE: 0;4
WONGBAKER_NUMERICALRESPONSE: 0;4
WONGBAKER_NUMERICALRESPONSE: 0
WONGBAKER_NUMERICALRESPONSE: 0
WONGBAKER_NUMERICALRESPONSE: 0;4
WONGBAKER_NUMERICALRESPONSE: 6
WONGBAKER_NUMERICALRESPONSE: 0
WONGBAKER_NUMERICALRESPONSE: 0
WONGBAKER_NUMERICALRESPONSE: 0;4
WONGBAKER_NUMERICALRESPONSE: 0
WONGBAKER_NUMERICALRESPONSE: 0
WONGBAKER_NUMERICALRESPONSE: 8
WONGBAKER_NUMERICALRESPONSE: 0;4
WONGBAKER_NUMERICALRESPONSE: 0
WONGBAKER_NUMERICALRESPONSE: 0;4
WONGBAKER_NUMERICALRESPONSE: 0;4
WONGBAKER_NUMERICALRESPONSE: 4
WONGBAKER_NUMERICALRESPONSE: 0

## 2019-01-01 ASSESSMENT — PAIN SCALES - GENERAL
PAINLEVEL_OUTOF10: 8
PAINLEVEL_OUTOF10: 1
PAINLEVEL_OUTOF10: 7
PAINLEVEL_OUTOF10: 6
PAINLEVEL_OUTOF10: 8
PAINLEVEL_OUTOF10: 6
PAINLEVEL_OUTOF10: 8
PAINLEVEL_OUTOF10: 10
PAINLEVEL_OUTOF10: 7
PAINLEVEL_OUTOF10: 10
PAINLEVEL_OUTOF10: 10
PAINLEVEL_OUTOF10: 0
PAINLEVEL_OUTOF10: 0
PAINLEVEL_OUTOF10: 7
PAINLEVEL_OUTOF10: 10
PAINLEVEL_OUTOF10: 8
PAINLEVEL_OUTOF10: 0
PAINLEVEL_OUTOF10: 6
PAINLEVEL_OUTOF10: 10
PAINLEVEL_OUTOF10: 5
PAINLEVEL_OUTOF10: 6
PAINLEVEL_OUTOF10: 9
PAINLEVEL_OUTOF10: 3
PAINLEVEL_OUTOF10: 0
PAINLEVEL_OUTOF10: 8
PAINLEVEL_OUTOF10: 7
PAINLEVEL_OUTOF10: 0
PAINLEVEL_OUTOF10: 10
PAINLEVEL_OUTOF10: 0
PAINLEVEL_OUTOF10: 5
PAINLEVEL_OUTOF10: 1
PAINLEVEL_OUTOF10: 0
PAINLEVEL_OUTOF10: 5
PAINLEVEL_OUTOF10: 10
PAINLEVEL_OUTOF10: 8
PAINLEVEL_OUTOF10: 2
PAINLEVEL_OUTOF10: 0
PAINLEVEL_OUTOF10: 0
PAINLEVEL_OUTOF10: 9
PAINLEVEL_OUTOF10: 3
PAINLEVEL_OUTOF10: 9
PAINLEVEL_OUTOF10: 10
PAINLEVEL_OUTOF10: 0
PAINLEVEL_OUTOF10: 7
PAINLEVEL_OUTOF10: 0
PAINLEVEL_OUTOF10: 0
PAINLEVEL_OUTOF10: 10
PAINLEVEL_OUTOF10: 0
PAINLEVEL_OUTOF10: 3
PAINLEVEL_OUTOF10: 5
PAINLEVEL_OUTOF10: 7
PAINLEVEL_OUTOF10: 10
PAINLEVEL_OUTOF10: 0
PAINLEVEL_OUTOF10: 0
PAINLEVEL_OUTOF10: 10
PAINLEVEL_OUTOF10: 10
PAINLEVEL_OUTOF10: 0
PAINLEVEL_OUTOF10: 10
PAINLEVEL_OUTOF10: 10
PAINLEVEL_OUTOF10: 5
PAINLEVEL_OUTOF10: 2
PAINLEVEL_OUTOF10: 1
PAINLEVEL_OUTOF10: 0
PAINLEVEL_OUTOF10: 6
PAINLEVEL_OUTOF10: 0
PAINLEVEL_OUTOF10: 9
PAINLEVEL_OUTOF10: 8
PAINLEVEL_OUTOF10: 6
PAINLEVEL_OUTOF10: 0
PAINLEVEL_OUTOF10: 5
PAINLEVEL_OUTOF10: 5
PAINLEVEL_OUTOF10: 0
PAINLEVEL_OUTOF10: 5
PAINLEVEL_OUTOF10: 1
PAINLEVEL_OUTOF10: 6
PAINLEVEL_OUTOF10: 5
PAINLEVEL_OUTOF10: 5
PAINLEVEL_OUTOF10: 0
PAINLEVEL_OUTOF10: 8
PAINLEVEL_OUTOF10: 1
PAINLEVEL_OUTOF10: 0
PAINLEVEL_OUTOF10: 10
PAINLEVEL_OUTOF10: 0
PAINLEVEL_OUTOF10: 10
PAINLEVEL_OUTOF10: 0
PAINLEVEL_OUTOF10: 6
PAINLEVEL_OUTOF10: 10
PAINLEVEL_OUTOF10: 5
PAINLEVEL_OUTOF10: 2
PAINLEVEL_OUTOF10: 7
PAINLEVEL_OUTOF10: 9
PAINLEVEL_OUTOF10: 6
PAINLEVEL_OUTOF10: 0
PAINLEVEL_OUTOF10: 5
PAINLEVEL_OUTOF10: 0
PAINLEVEL_OUTOF10: 5
PAINLEVEL_OUTOF10: 8
PAINLEVEL_OUTOF10: 6
PAINLEVEL_OUTOF10: 9
PAINLEVEL_OUTOF10: 10
PAINLEVEL_OUTOF10: 0
PAINLEVEL_OUTOF10: 0
PAINLEVEL_OUTOF10: 1
PAINLEVEL_OUTOF10: 10
PAINLEVEL_OUTOF10: 10
PAINLEVEL_OUTOF10: 0
PAINLEVEL_OUTOF10: 9
PAINLEVEL_OUTOF10: 0
PAINLEVEL_OUTOF10: 0
PAINLEVEL_OUTOF10: 8
PAINLEVEL_OUTOF10: 0
PAINLEVEL_OUTOF10: 9
PAINLEVEL_OUTOF10: 5
PAINLEVEL_OUTOF10: 0
PAINLEVEL_OUTOF10: 5
PAINLEVEL_OUTOF10: 6
PAINLEVEL_OUTOF10: 10

## 2019-01-01 ASSESSMENT — ENCOUNTER SYMPTOMS
WHEEZING: 0
SHORTNESS OF BREATH: 0
SORE THROAT: 0
VOMITING: 0
NAUSEA: 0
COUGH: 0
SHORTNESS OF BREATH: 0
CONSTIPATION: 0
VOMITING: 0
SHORTNESS OF BREATH: 0
RECTAL PAIN: 0
EYE PAIN: 0
NAUSEA: 0
EYE DISCHARGE: 0
BACK PAIN: 0
CHEST TIGHTNESS: 0
NAUSEA: 0
COUGH: 0
ABDOMINAL PAIN: 0
RECTAL PAIN: 0
ABDOMINAL DISTENTION: 0
TROUBLE SWALLOWING: 0
ABDOMINAL PAIN: 0
BACK PAIN: 0
ANAL BLEEDING: 0
ANAL BLEEDING: 0
ABDOMINAL DISTENTION: 0
EYE PAIN: 0
EYE PAIN: 0
COUGH: 0
CHEST TIGHTNESS: 0
EYE DISCHARGE: 0
TROUBLE SWALLOWING: 0
SORE THROAT: 0
COLOR CHANGE: 0
NAUSEA: 0
STRIDOR: 0
COUGH: 0
SHORTNESS OF BREATH: 0
ANAL BLEEDING: 0
VOMITING: 0
COLOR CHANGE: 0
CHEST TIGHTNESS: 0
SHORTNESS OF BREATH: 1
VOMITING: 0
EYE DISCHARGE: 0
ALLERGIC/IMMUNOLOGIC NEGATIVE: 1
NAUSEA: 0
EYE PAIN: 0
EYES NEGATIVE: 1
WHEEZING: 0
DIARRHEA: 0
ABDOMINAL PAIN: 0
BLOOD IN STOOL: 0
SORE THROAT: 0
CHOKING: 0
GASTROINTESTINAL NEGATIVE: 1
EYE REDNESS: 0
ABDOMINAL PAIN: 0
RECTAL PAIN: 0
CONSTIPATION: 0
APNEA: 0
STRIDOR: 0
STRIDOR: 0
APNEA: 0
SORE THROAT: 0
ABDOMINAL PAIN: 0
SORE THROAT: 0
TROUBLE SWALLOWING: 0
RESPIRATORY NEGATIVE: 1
RECTAL PAIN: 0
VOMITING: 0
EYE PAIN: 0
APNEA: 0
SHORTNESS OF BREATH: 0
DIARRHEA: 0
COUGH: 0
BLOOD IN STOOL: 0
EYE DISCHARGE: 0
CHEST TIGHTNESS: 0
SORE THROAT: 0
DIARRHEA: 0
ABDOMINAL PAIN: 1
WHEEZING: 0
WHEEZING: 0
COUGH: 0
GASTROINTESTINAL NEGATIVE: 1
APNEA: 0
ABDOMINAL PAIN: 0
WHEEZING: 0
ANAL BLEEDING: 0
SHORTNESS OF BREATH: 0
RECTAL PAIN: 0
EYE PAIN: 0
ABDOMINAL PAIN: 0
RESPIRATORY NEGATIVE: 1
CHEST TIGHTNESS: 0
ABDOMINAL PAIN: 0
STRIDOR: 0
TROUBLE SWALLOWING: 0
NAUSEA: 0
ABDOMINAL PAIN: 0
EYE DISCHARGE: 0
VOMITING: 0
CHEST TIGHTNESS: 0
ANAL BLEEDING: 0
BACK PAIN: 0
VOMITING: 0
NAUSEA: 0
CONSTIPATION: 0
VOMITING: 0
SORE THROAT: 0
EYES NEGATIVE: 1
CONSTIPATION: 0
NAUSEA: 0
DIARRHEA: 0
SHORTNESS OF BREATH: 0
SHORTNESS OF BREATH: 0
BLOOD IN STOOL: 0

## 2019-01-01 ASSESSMENT — PAIN DESCRIPTION - LOCATION
LOCATION: LEG
LOCATION: KNEE
LOCATION: KNEE
LOCATION: ABDOMEN
LOCATION: ARM
LOCATION: LEG
LOCATION: ANKLE
LOCATION: KNEE
LOCATION: LEG
LOCATION: KNEE
LOCATION: LEG
LOCATION: LEG
LOCATION: FOOT;ANKLE;LEG
LOCATION: LEG
LOCATION: KNEE;LEG;ANKLE
LOCATION: LEG
LOCATION: LEG
LOCATION: ABDOMEN
LOCATION: KNEE
LOCATION: LEG
LOCATION: CHEST
LOCATION: KNEE
LOCATION: ARM;LEG
LOCATION: LEG
LOCATION: LEG
LOCATION: KNEE
LOCATION: LEG
LOCATION: NECK
LOCATION: LEG
LOCATION: KNEE
LOCATION: LEG
LOCATION: ABDOMEN;LEG
LOCATION: NECK
LOCATION: KNEE
LOCATION: LEG
LOCATION: HEAD

## 2019-01-01 ASSESSMENT — PAIN DESCRIPTION - ORIENTATION
ORIENTATION: RIGHT
ORIENTATION: LEFT
ORIENTATION: RIGHT
ORIENTATION: RIGHT
ORIENTATION: LEFT
ORIENTATION: LEFT
ORIENTATION: RIGHT
ORIENTATION: RIGHT
ORIENTATION: LEFT
ORIENTATION: LEFT
ORIENTATION: RIGHT
ORIENTATION: LEFT
ORIENTATION: LEFT
ORIENTATION: RIGHT
ORIENTATION: RIGHT
ORIENTATION: LEFT
ORIENTATION: LEFT
ORIENTATION: RIGHT
ORIENTATION: MID
ORIENTATION: LEFT
ORIENTATION: RIGHT
ORIENTATION: LEFT

## 2019-01-01 ASSESSMENT — PULMONARY FUNCTION TESTS
PIF_VALUE: 1

## 2019-01-01 ASSESSMENT — PAIN DESCRIPTION - FREQUENCY
FREQUENCY: CONTINUOUS
FREQUENCY: INTERMITTENT
FREQUENCY: CONTINUOUS
FREQUENCY: INTERMITTENT
FREQUENCY: CONTINUOUS
FREQUENCY: INTERMITTENT
FREQUENCY: CONTINUOUS
FREQUENCY: INTERMITTENT
FREQUENCY: CONTINUOUS
FREQUENCY: INTERMITTENT
FREQUENCY: INTERMITTENT
FREQUENCY: CONTINUOUS
FREQUENCY: INTERMITTENT
FREQUENCY: OTHER (COMMENT)
FREQUENCY: CONTINUOUS
FREQUENCY: INTERMITTENT
FREQUENCY: CONTINUOUS
FREQUENCY: INTERMITTENT
FREQUENCY: INTERMITTENT

## 2019-01-01 ASSESSMENT — PAIN DESCRIPTION - PAIN TYPE
TYPE: CHRONIC PAIN
TYPE: ACUTE PAIN
TYPE: CHRONIC PAIN
TYPE: ACUTE PAIN
TYPE: CHRONIC PAIN
TYPE: ACUTE PAIN
TYPE: CHRONIC PAIN
TYPE: ACUTE PAIN
TYPE: CHRONIC PAIN
TYPE: ACUTE PAIN
TYPE: ACUTE PAIN
TYPE: CHRONIC PAIN
TYPE: CHRONIC PAIN
TYPE: ACUTE PAIN
TYPE: CHRONIC PAIN
TYPE: ACUTE PAIN
TYPE: CHRONIC PAIN
TYPE: ACUTE PAIN
TYPE: CHRONIC PAIN
TYPE: ACUTE PAIN
TYPE: CHRONIC PAIN
TYPE: CHRONIC PAIN

## 2019-01-01 ASSESSMENT — PATIENT HEALTH QUESTIONNAIRE - PHQ9
SUM OF ALL RESPONSES TO PHQ QUESTIONS 1-9: 0
2. FEELING DOWN, DEPRESSED OR HOPELESS: 0
1. LITTLE INTEREST OR PLEASURE IN DOING THINGS: 0
SUM OF ALL RESPONSES TO PHQ9 QUESTIONS 1 & 2: 0
SUM OF ALL RESPONSES TO PHQ QUESTIONS 1-9: 0

## 2019-01-01 ASSESSMENT — PAIN DESCRIPTION - PROGRESSION
CLINICAL_PROGRESSION: NOT CHANGED
CLINICAL_PROGRESSION: GRADUALLY WORSENING
CLINICAL_PROGRESSION: NOT CHANGED
CLINICAL_PROGRESSION: GRADUALLY IMPROVING
CLINICAL_PROGRESSION: NOT CHANGED
CLINICAL_PROGRESSION: GRADUALLY IMPROVING
CLINICAL_PROGRESSION: NOT CHANGED
CLINICAL_PROGRESSION: NOT CHANGED
CLINICAL_PROGRESSION: GRADUALLY WORSENING
CLINICAL_PROGRESSION: NOT CHANGED
CLINICAL_PROGRESSION: GRADUALLY WORSENING
CLINICAL_PROGRESSION: NOT CHANGED

## 2019-01-01 ASSESSMENT — PAIN DESCRIPTION - ONSET
ONSET: ON-GOING

## 2019-01-01 ASSESSMENT — PAIN DESCRIPTION - DESCRIPTORS
DESCRIPTORS: ACHING
DESCRIPTORS: ACHING;CONSTANT
DESCRIPTORS: SHARP
DESCRIPTORS: THROBBING
DESCRIPTORS: PATIENT UNABLE TO DESCRIBE
DESCRIPTORS: SHARP
DESCRIPTORS: ACHING
DESCRIPTORS: PATIENT UNABLE TO DESCRIBE
DESCRIPTORS: ACHING
DESCRIPTORS: SHARP
DESCRIPTORS: ACHING
DESCRIPTORS: CONSTANT;ACHING
DESCRIPTORS: PATIENT UNABLE TO DESCRIBE
DESCRIPTORS: PATIENT UNABLE TO DESCRIBE
DESCRIPTORS: SHARP
DESCRIPTORS: ACHING

## 2019-01-01 ASSESSMENT — PAIN - FUNCTIONAL ASSESSMENT
PAIN_FUNCTIONAL_ASSESSMENT: PREVENTS OR INTERFERES WITH ALL ACTIVE AND SOME PASSIVE ACTIVITIES
PAIN_FUNCTIONAL_ASSESSMENT: PREVENTS OR INTERFERES WITH ALL ACTIVE AND SOME PASSIVE ACTIVITIES
PAIN_FUNCTIONAL_ASSESSMENT: PREVENTS OR INTERFERES SOME ACTIVE ACTIVITIES AND ADLS
PAIN_FUNCTIONAL_ASSESSMENT: ACTIVITIES ARE NOT PREVENTED
PAIN_FUNCTIONAL_ASSESSMENT: PREVENTS OR INTERFERES SOME ACTIVE ACTIVITIES AND ADLS
PAIN_FUNCTIONAL_ASSESSMENT: PREVENTS OR INTERFERES WITH ALL ACTIVE AND SOME PASSIVE ACTIVITIES
PAIN_FUNCTIONAL_ASSESSMENT: PREVENTS OR INTERFERES WITH ALL ACTIVE AND SOME PASSIVE ACTIVITIES
PAIN_FUNCTIONAL_ASSESSMENT: 0-10
PAIN_FUNCTIONAL_ASSESSMENT: PREVENTS OR INTERFERES WITH ALL ACTIVE AND SOME PASSIVE ACTIVITIES

## 2019-01-01 ASSESSMENT — PAIN DESCRIPTION - DIRECTION: RADIATING_TOWARDS: NOT RADIATING

## 2019-01-01 ASSESSMENT — HEART SCORE: ECG: 0

## 2019-05-24 NOTE — TELEPHONE ENCOUNTER
Called and LM with someone who answered the phone for pt to call and schedule 1 yr follow up for mid July with Dr. Gustavo Serrano.  No labs needed

## 2019-06-11 NOTE — TELEPHONE ENCOUNTER
LEFT MESSAGE WITH PATIENT'S DAUGHTER/CONTACT, LUCILLE. SHE WILL TALK TO HER MOTHER AND CALL US BACK TO SCHEDULE HER 1 YEAR FOLLOW UP THAT IS DUE IN July 2019. THIS WAS THE SECOND CALL WE MADE TO GET APPT SCHEDULED, FIRST CALL WAS ON 5/24/19.

## 2019-07-24 NOTE — PROGRESS NOTES
refills. Pt is on triple therapy for bp and it is stable today. Blood tests ordered last visit were not complete. She stopped zocor and currently is not on iron supplements. Also c/o intermittent left leg tremor and has gait disturbance. States her left leg started shaking yesterday which affects her balance. Denies vision change, headache dizziness sob cp or nv abd pain. Hx of lymphedema in right leg but hasn't followed up as referred. Review of Systems   Constitutional: Negative for chills and fever. Eyes: Negative for visual disturbance. Respiratory: Negative for shortness of breath and wheezing. Cardiovascular: Negative for chest pain and leg swelling. Gastrointestinal: Negative for abdominal pain, nausea and vomiting. Neurological: Positive for tremors. Negative for dizziness, weakness and numbness. Psychiatric/Behavioral: Negative for dysphoric mood. Objective:   Physical Exam   Constitutional: She appears well-nourished. No distress. HENT:   Nose: Nose normal.   Mouth/Throat: Oropharynx is clear and moist.   Eyes: Conjunctivae are normal.   Neck: Neck supple. Cardiovascular: Normal rate, regular rhythm and normal heart sounds. Pulmonary/Chest: Effort normal and breath sounds normal. No respiratory distress. Abdominal: Soft. There is no tenderness. Musculoskeletal: Normal range of motion. Lymphadenopathy:     She has no cervical adenopathy. Neurological: She is alert. No cranial nerve deficit. No tremor noted at this time. Gait unsteady   Skin: Skin is warm and dry. Psychiatric: She has a normal mood and affect. Her behavior is normal.   Nursing note and vitals reviewed. Assessment:      1. Essential hypertension    2. Hyperlipidemia, unspecified hyperlipidemia type    3. Anemia, unspecified type    4.  Tremor            Plan:       BP Readings from Last 3 Encounters:   07/24/19 132/80   01/23/19 130/88   10/31/18 130/80     /80 (Site: Right Upper Arm, mouth 2 times daily       Medications Discontinued During This Encounter   Medication Reason    clobetasol (TEMOVATE) 0.05 % cream REORDER    clopidogrel (PLAVIX) 75 MG tablet REORDER    hydrALAZINE (APRESOLINE) 50 MG tablet REORDER    carvedilol (COREG) 25 MG tablet REORDER    furosemide (LASIX) 20 MG tablet REORDER       Letta Sender received counseling on the following healthy behaviors: nutrition and exercise  Reviewed prior labs and health maintenance  Continue current medications, diet and exercise. Discussed use, benefit, and side effects of prescribed medications. Barriers to medication compliance addressed. Patient given educational materials - see patient instructions  Was a self-tracking handout given in paper form or via [employer]? Yes    Requested Prescriptions     Signed Prescriptions Disp Refills    clobetasol (TEMOVATE) 0.05 % cream 45 g 1     Sig: Apply topically 2 times daily.  clopidogrel (PLAVIX) 75 MG tablet 90 tablet 1     Si tab once daily    hydrALAZINE (APRESOLINE) 50 MG tablet 180 tablet 1     Sig: Take 1 tablet by mouth 2 times daily    carvedilol (COREG) 25 MG tablet 180 tablet 1     Sig: Take 1 tablet by mouth 2 times daily (with meals)    furosemide (LASIX) 20 MG tablet 180 tablet 1     Sig: Take 1 tablet by mouth 2 times daily       All patient questions answered. Patient voiced understanding. Quality Measures    Body mass index is 30.27 kg/m². Elevated. Weight control planned discussed medically supervised diet with primary care physician and Healthy diet and regular exercise. BP: 132/80. Blood pressure is normal. Treatment plan consists of Weight Reduction, Dietary Sodium Restriction, Increased Physical Activity and No treatment change needed. Fall Risk 2018 2017 3/29/2016   2 or more falls in past year? no no no   Fall with injury in past year? no no no     The patient has a history of falls. I did , complete a risk assessment for falls.  A plan of

## 2019-07-27 PROBLEM — G45.9 TIA (TRANSIENT ISCHEMIC ATTACK): Status: ACTIVE | Noted: 2019-01-01

## 2019-07-27 NOTE — ED PROVIDER NOTES
abdominal masses, non tender  MSK: No midline back pain, no large joint effusions  Neuro: Alert and oriented, left-sided facial droop is chronic  Psych: Cooperative, appropriate mood and affect  -----------------------  -----------------------  Pt seen at: 0035  Last known normal: 24 horus  Is pt an endovascular candidate? [no]  -----------------------  NIH STROKE SCALE = [0]  -- 1a Level of consciousness: --  [x] Alert, keenly responsive (0)  [] Not alert, but arousable by minor stimulation (1)  [] Not alert; requires repeated stimulation (2)  [] Unresponsive or responds only with reflex (3)  -- 1b Level of consciousness questions: --  What is the month? What is your age? [x] Answers two questions correctly (0)  [] Answers one question correctly (1)  [] Answers neither question correctly (2)  -- 1c Level of consciousness commands: --  Open and close your eyes.  and release your hand  [x]  Performs both tasks correctly (0)  [] Performs one task correctly (1)  [] Performs neither task correctly (2)  -- 2 Best Gaze --  [x] Normal (0)  [] Partial gaze palsy (1)  [] Forced deviation (2)  -- 3 Visual --  [x] No visual loss (0)  [] Partial hemianopia (1)  [] Complete hemianopia (2)  [] Bilateral hemianopia (3)  -- 4 Facial palsy --  [] Normal symmetric movements (0)  [x] Minor paralysis () OLD from bells palsy  [] Partial paralysis (2)  [] Complete paralysis of one or both sides (3)  -- 5 Motor Arm --  5a Left arm. [x] No drift (0)  [] Drift (1)  [] Some effort against gravity (2)  [] No effort against gravity; limb falls (3)  [] No movement (4)  5b Right arm. [x] No drift (0)  [] Drift (1)  [] Some effort against gravity (2)  [] No effort against gravity; limb falls (3)  [] No movement (4)  -- 6 Motor Leg --   6a Left Leg  [x] No drift (0)  [] Drift (1)  [] Some effort against gravity (2)  [] No effort against gravity; limb falls (3)  [] No movement (4)   6a Right leg.   [x] No drift (0)  [] Drift (1)  [] Some effort against gravity (2)  [] No effort against gravity; limb falls (3)  [] No movement (4)  -- 7 Limb ataxia --  [x] Absent (0)  [] Present in one limb (1)  [] PResent in two limbs (2)  -- 8 Sensory --  [x] Normal; no sensory loss (0)  [] Mild-to-moderate sensory loss (1)  [] Severe to total sensory loss (2)  -- 9 Best language --  [x] No aphasia; normal (0)  [] Mild to moderate aphasia (1)  [] Severe aphasia (2)  [] Mute, global aphasia (3)  -- 10 Dysarthria --   [x] Normal (0)  [] Mild to moderate dysarthria (1)  [] Severe dysarthria (2)  --11 Extinction and inattention --   [x] No abnormality (0)  [] Visual, tactile, auditory, spatial, or personal inattention (1)  [] profound joaquín-inattention or extinction (2)  -----------------------  TPA Checklist  Inclusion:  [x] >22 years old  [] Acute Ischemic stroke with clearly defined time of onset < 4.5 hours  [] Measurable deficit  [x] Baseline CT scan that showed no evidence of hemorrhage  -----------------------  Exclusion:  [] Another stroke, serious head injury, intracranial surgery within the preceding 3 months  [] History of intracranial hemorrhage, AV malformation, aneurysm  [] Hemorrhage on CT  [] Lumbar puncture within 7 days  [] Diabetic retinopathy or other hemorrhage ophthalmic condition  [] pregnant or lactating  [] Major surgery or serious trauma within prior 14 days  [] SBP>185 or DBP >110  [] Rapidly improving or minor symptoms  [] Symptoms suggestive of SAH  [] GI or  hemorrhage within the previous 21 days  [] Arterial puncture at a non-compressible site within the previous 7 days  [] Seizure at onset of stroke  [] Use of anticoagulation:  -received heparin within the 48 hours preceding the onset of stroke who have an elevated PTT  -patients with a PT >15 seconds (or INR > 1.6)  -patients with a platelet count <296,321  [] Glucose level of <50 or >400  -----------------------  -----------------------  MEDICAL DECISION MAKING  Differential Diagnosis:  - Consideration is given for    CVA, dissection, ACS, intracranial hemorrhage, spinal abscess, spinal hematoma, cauda equina syndrome, spinal fracture, dissection, neuropathy  -  #Impression/Plan:  - Clinically patient's presentation is most consistent with some form of neuropathy. However given patient's symptoms and the progression concern for possible TIA. Family strongly wants patient admitted for further evaluation. Will get laboratory testing and imaging. Will speak with PCP regarding possible admission.  -  ##Reevaluation/Conversations on care:  -   -   ##Diagnosis:  -Left leg weakness  -  -----------------------  -----------------------  Damaris Gee MD, [facility]  Emergency Medicine Attending  Questions? Please contact my cell phone anytime. [phone]  *This charting supersedes any ED resident or staff charting and was written using speech recognition software      PASTMEDICAL HISTORY     Past Medical History:   Diagnosis Date    Abnormal kidney function     PT. RELATES UNAWARE OF.  Acid reflux disease     Anesthesia     'needs to be asleep when ET tube removed due  to severe gastric reflux will make me have hard time breathing. \"     Arthritis     Bell's palsy     Cancer ([address]     Breast, lt.  Cerebrovascular disease     pt denies stroke she says she had bells palsy    COPD (chronic obstructive pulmonary disease) ([address]     patient is unaware    Deep vein thrombosis (DVT) ([address]     multiple    Edema     RT. LEG.  History of cancer of left breast     Hx of blood clots     dvt scott. legs.     Hyperlipidemia     Hypertension     Pancreatitis     Wears dentures     UPPER    Wears glasses      SURGICAL HISTORY       Past Surgical History:   Procedure Laterality Date    APPENDECTOMY      BREAST LUMPECTOMY Left 07/14/2016    with sentinel lymph node dissection    CHOLECYSTECTOMY  2011    HYSTERECTOMY      partial    MASTECTOMY Left 09/12/2016    [place] SONO GUIDE NEEDLE BIOPSY 2016    STOMACH SURGERY      \"MASS REMOVED ,( BENIGN)\"    TOTAL KNEE ARTHROPLASTY Right     TUBAL LIGATION       CURRENT MEDICATIONS       Previous Medications    ACETAMINOPHEN (APAP EXTRA STRENGTH) 500 MG TABLET    Take 1 tablet by mouth every 6 hours as needed for Pain    CARVEDILOL (COREG) 25 MG TABLET    Take 1 tablet by mouth 2 times daily (with meals)    CLOBETASOL (TEMOVATE) 0.05 % CREAM    Apply topically 2 times daily. CLOPIDOGREL (PLAVIX) 75 MG TABLET    1 tab once daily    DICLOFENAC SODIUM (VOLTAREN) 1 % GEL    Apply 2 g topically 2 times daily    FUROSEMIDE (LASIX) 20 MG TABLET    Take 1 tablet by mouth 2 times daily    HYDRALAZINE (APRESOLINE) 50 MG TABLET    Take 1 tablet by mouth 2 times daily    PANTOPRAZOLE (PROTONIX) 40 MG TABLET    Take 1 tablet by mouth daily    SIMVASTATIN (ZOCOR) 40 MG TABLET    Take 40 mg by mouth nightly     ALLERGIES     is allergic to latex and asa [aspirin]. FAMILY HISTORY     She indicated that her mother is . She indicated that her father is . She indicated that her maternal grandmother is . She indicated that her maternal grandfather is . She indicated that her paternal grandmother is . She indicated that her paternal grandfather is .      SOCIAL HISTORY       Social History     Tobacco Use    Smoking status: Current Every Day Smoker     Packs/day: 1.00     Years: 54.00     Pack years: 54.00    Smokeless tobacco: Never Used   Substance Use Topics    Alcohol use: Yes     Comment: 4 times a year of alcohol use    Drug use: No     PHYSICAL EXAM     INITIAL VITALS: BP (!) 216/86   Pulse 80   Temp 98.4 °F (36.9 °C) (Oral)   Resp 18   Ht 5' 3\" (1.6 m)   Wt 153 lb (69.4 kg)   SpO2 100%   BMI 27.10 kg/m²    Physical Exam    MEDICAL DECISION MAKING:            Labs Reviewed   CBC WITH AUTO DIFFERENTIAL - Abnormal; Notable for the following components:       Result Value    RBC 5.53 (*)     MCV 69.7 (*)     [facility]

## 2019-07-27 NOTE — H&P
Start Date End Date Taking? Authorizing Provider   clobetasol (TEMOVATE) 0.05 % cream Apply topically 2 times daily. 7/24/19  Yes Mark Foots, APRN - CNP   clopidogrel (PLAVIX) 75 MG tablet 1 tab once daily 7/24/19  Yes Mark Foots, APRN - CNP   hydrALAZINE (APRESOLINE) 50 MG tablet Take 1 tablet by mouth 2 times daily 7/24/19  Yes Mark Foots, APRN - CNP   carvedilol (COREG) 25 MG tablet Take 1 tablet by mouth 2 times daily (with meals) 7/24/19  Yes Mark Foots, APRN - CNP   furosemide (LASIX) 20 MG tablet Take 1 tablet by mouth 2 times daily 7/24/19  Yes Mark Foots, APRN - CNP   diclofenac sodium (VOLTAREN) 1 % GEL Apply 2 g topically 2 times daily 10/31/18  Yes [place] Foots, APRN - CNP   pantoprazole (PROTONIX) 40 MG tablet Take 1 tablet by mouth daily 4/23/18  Yes [place] Foots, APRN - CNP   simvastatin (ZOCOR) 40 MG tablet Take 40 mg by mouth nightly   Yes Historical Provider, MD   acetaminophen (APAP EXTRA STRENGTH) 500 MG tablet Take 1 tablet by mouth every 6 hours as needed for Pain 7/27/18   Mark Foots, APRN - CNP       Current meds  Scheduled Meds:   sodium chloride flush  10 mL Intravenous 2 times per day    enoxaparin  40 mg Subcutaneous Daily    carvedilol  25 mg Oral BID WC    clopidogrel  75 mg Oral Daily    diclofenac sodium  2 g Topical BID    furosemide  20 mg Oral BID    hydrALAZINE  50 mg Oral BID    pantoprazole  40 mg Oral Daily    simvastatin  40 mg Oral Nightly     Continuous Infusions:  PRN Meds:.sodium chloride flush, acetaminophen, acetaminophen    Allergies:  Latex and Asa [aspirin]    Social History:   TOBACCO:   reports that she has been smoking. She has a 54.00 pack-year smoking history. She has never used smokeless tobacco.  ETOH:   reports that she drinks alcohol.   OCCUPATION:      Family History:       Problem Relation Age of Onset    High Blood Pressure Mother     Heart Disease Mother     Other Father         TB       REVIEW OF SYSTEMS:  Constitutional:  negative for  fevers, chills, sweats and weight loss  HEENT:  negative for  hearing loss, ear drainage, nasal congestion, snoring, hoarseness and voice change  Neck:  No swollen glands and No h/o goiter or thyroid disease  Cardiac:  negative for  chest pain, dyspnea, palpitations, orthopnea, PND, edema  Respiratory:  negative for  dry cough, cough with sputum, dyspnea, wheezing and hemoptysis  Gastrointestinal:  negative for nausea, vomiting, change in bowel habits, diarrhea, constipation, abdominal pain and hemtochezia  :  negative for frequency, dysuria, urinary incontinence, hesitancy, decreased stream and hematuria  Musculoskeletal:  negative for  myalgias, arthralgias, joint swelling and stiff joints  Neuro:  negative for headaches, dizziness, seizures, memory problems, visual disturbance, weakness and syncope      Physical Exam:    Vitals: BP (!) 183/95   Pulse 71   Temp 97.8 °F (36.6 °C) (Oral)   Resp 18   Ht 5' 3\" (1.6 m)   Wt 156 lb 1.4 oz (70.8 kg)   SpO2 99%   BMI 27.65 kg/m²   General appearance: alert, appears stated age and cooperative  Skin: Skin color, texture, turgor normal. No rashes or lesions  HEENT: Head: Normocephalic, no lesions, without obvious abnormality.   Eye: Normal external eye, conjunctiva, lids cornea, АЛЕКСАНДР  Neck: no adenopathy, no carotid bruit, no JVD, supple, symmetrical, trachea midline and thyroid not enlarged, symmetric, no tenderness/mass/nodules  Lungs: clear to auscultation bilaterally  Heart: regular rate and rhythm, S1, S2 normal, no murmur, click, rub or gallop  Abdomen: soft, tender in abdomen more so in epigastric region  Extremities: extremities normal, atraumatic, no cyanosis or edema  Neurologic: Mental status: Alert, oriented, thought content appropriate    CBC:   Recent Labs     07/27/19 0105   WBC 4.4   HGB 12.1        BMP:    Recent Labs     07/27/19 0105      K 3.8   *   CO2 23   BUN 19   CREATININE 1.23*   GLUCOSE 89     Hepatic:   Recent Labs     07/27/19 0105   AST 14 ALT 7   BILITOT 0.24*   ALKPHOS 75     Troponin: No results for input(s): TROPONINI in the last 72 hours. BNP: No results for input(s): BNP in the last 72 hours. Lipids: No results for input(s): CHOL, HDL in the last 72 hours. Invalid input(s): LDLCALCU  ABGs: No results found for: PHART, PO2ART, [id number]  INR:   Recent Labs     07/27/19  0105   INR 1.0     -----------------------------------------------------------------  PA/lat CXR: Ct Head Wo Contrast    Result Date: 7/27/2019  EXAMINATION: CT OF THE HEAD WITHOUT CONTRAST  7/27/2019 1:25 am TECHNIQUE: CT of the head was performed without the administration of intravenous contrast. Dose modulation, iterative reconstruction, and/or weight based adjustment of the mA/kV was utilized to reduce the radiation dose to as low as reasonably achievable. COMPARISON: 09/08/2008 HISTORY: ORDERING SYSTEM PROVIDED HISTORY: left leg weakness TECHNOLOGIST PROVIDED HISTORY: Reason for Exam: Patient states left leg weakness and shakiness for past new days, patient states no recent head complaints Acuity: Acute Relevant Medical/Surgical History: hx of DVT of lower extremity per epic FINDINGS: BRAIN/VENTRICLES: There is slight generalized atrophy. There is mild to moderate patchy periventricular and subcortical white matter low attenuation that is nonspecific but most consistent with chronic small vessel ischemia. Remote right basal ganglia lacunar infarct versus prominent perivascular space. Remote lacunar infarcts in the pablo. There is no evidence of acute hemorrhage, mass or extra-axial fluid collection. ORBITS: The visualized portion of the orbits demonstrate no acute abnormality. SINUSES: The visualized paranasal sinuses and mastoid air cells demonstrate no acute abnormality. Mild mucosal thickening in the right sphenoid locule. SOFT TISSUES/SKULL:  No acute abnormality of the visualized skull or soft tissues. No acute intracranial abnormality.   Chronic ischemic changes as above. Xr Chest Portable    Result Date: 7/27/2019  EXAMINATION: ONE XRAY VIEW OF THE CHEST 7/27/2019 1:13 am COMPARISON: June 17, 2017 HISTORY: ORDERING SYSTEM PROVIDED HISTORY: cp TECHNOLOGIST PROVIDED HISTORY: cp Reason for Exam: chest pain, leg pain Acuity: Unknown Type of Exam: Unknown FINDINGS: Shallow inspiration. Pulmonary vasculature is magnified by technique. No airspace consolidation, pleural effusion, or pneumothorax. Cardiomegaly. Calcified plaquing of the aorta. No focal airspace consolidation. EKG: no acute changes     Prophylaxis:   DVT with  [x] lovenox        [] heparin        [] Scd        [] none:     Assessment and Plan   1. H/o tia/back to basic  2. Abdominal pain/check ct abdomen and pelvis  3. Knee pain/check x ray  4. Non complaince    Patient Active Problem List   Diagnosis Code    Chronic pancreatitis ([facility] K86.1    Essential hypertension I10    Hyperlipidemia E78.5    Psoriasis L40.9    Gastroesophageal reflux disease without esophagitis K21.9    Bell's palsy G51.0    Chronic deep vein thrombosis (DVT) of both lower extremities (HCC) I82.503    Tobacco abuse Z72.0    History of alcohol abuse Z87.898    Malignant neoplasm of upper-outer quadrant of left female breast ([facility] C50.412    Obesity (BMI 30.0-34. 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    Delayed surgical wound healing T81.89XA    Cellulitis L03.90    Anemia D64.9    Noncompliance Z91.19    CKD (chronic kidney disease) stage 3, GFR 30-59 ml/min (HCC) N18.3    Iron deficiency anemia D50.9    TIA (transient ischemic attack) G45.9       Anticipated Disposition upon discharge: [] Home                                                                         [] Home with [address]                                                                         [] [facility]                                                                         [] [address] ,[address]          Patient is admitted as inpatient status because of co-morbidities listed above, severity of signs and symptoms as outlined, requirement for current medical therapies and most importantly because of direct risk to patient if care not provided in a hospital setting.     Leila Prieto MD  Admitting Hospitalist

## 2019-07-27 NOTE — DISCHARGE INSTR - COC
Continuity of Care Form    Patient Name: Lynn Dillard   :  1940  MRN:  [id number]    Admit date:  2019  Discharge date:      Code Status Order: Full Code   Advance Directives:   [facility] Documentation     Date/Time Healthcare Directive Type of Healthcare Directive Copy in [facility] [address] Agent's Name Healthcare Agent's Phone Number    [phone]  No, patient does not have an advance directive for healthcare treatment -- -- -- -- --          Admitting Physician:  Parmjit Tapia MD  PCP: David Bowles MD    Discharging Nurse: Hugh Lazo Unit/Room#: 2086/2086-01  Discharging Unit Phone Number: [phone]    Emergency Contact:   Extended Emergency Contact Information  Primary Emergency Contact: Roe Duran [address] Phone: [phone]  Mobile Phone: [phone]  Relation: Child   needed? No  Secondary Emergency Contact: Crystal Moon [address] Phone: [phone]  Work Phone: [phone]  Mobile Phone: [phone]  Relation: Child   needed?  No    Past Surgical History:  Past Surgical History:   Procedure Laterality Date    APPENDECTOMY      BREAST LUMPECTOMY Left 2016    with sentinel lymph node dissection    CHOLECYSTECTOMY  2011    HYSTERECTOMY      partial    MASTECTOMY Left 2016    [place] SONO GUIDE NEEDLE BIOPSY  2016    STOMACH SURGERY      \"MASS REMOVED ,( BENIGN)\"    TOTAL KNEE ARTHROPLASTY Right     TUBAL LIGATION         Immunization History:   Immunization History   Administered Date(s) Administered    Influenza Vaccine, unspecified formulation 2014, 10/28/2015    Influenza, High Dose (Fluzone 65 yrs and older) 10/31/2018    Influenza, MDCK, Preservative free 10/07/2013    Pneumococcal Conjugate 13-valent ([id number]) 10/15/2016    Pneumococcal Conjugate Vaccine 10/28/2015    Pneumococcal Polysaccharide ([id number]) 02/01/2011       Active Problems:  Patient Active Problem List   Diagnosis Code    Chronic pancreatitis ([facility] K86.1    Essential hypertension I10    Hyperlipidemia E78.5    Psoriasis L40.9    Gastroesophageal reflux disease without esophagitis K21.9    Bell's palsy G51.0    Chronic deep vein thrombosis (DVT) of both lower extremities ([facility]) I82.503    Tobacco abuse Z72.0    History of alcohol abuse Z87.898    Malignant neoplasm of upper-outer quadrant of left female breast ([facility] C50.412    Obesity (BMI 30.0-34. 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    Delayed surgical wound healing T81.89XA    Cellulitis L03.90    Anemia D64.9    Noncompliance Z91.19    CKD (chronic kidney disease) stage 3, GFR 30-59 ml/min ([facility]) N18.3    Iron deficiency anemia D50.9    TIA (transient ischemic attack) G45.9       Isolation/Infection:   Isolation          No Isolation            Nurse Assessment:  Last Vital Signs: BP (!) 197/73   Pulse 63   Temp 98 °F (36.7 °C) (Oral)   Resp 17   Ht 5' 3\" (1.6 m)   Wt 156 lb 1.4 oz (70.8 kg)   SpO2 99%   BMI 27.65 kg/m²     Last documented pain score (0-10 scale): Pain Level: 6  Last Weight:   Wt Readings from Last 1 Encounters:   07/27/19 156 lb 1.4 oz (70.8 kg)     Mental Status:  oriented and alert    IV Access:  - None    Nursing Mobility/ADLs:  Walking   Independent  Transfer  Independent  Bathing  Independent  Dressing  Independent  Toileting  Independent  Feeding  Independent  Med Admin  Independent  Med Delivery   crushed and prefers mixed with applesauce    Wound Care Documentation and Therapy:  Wound 10/21/16 Wound #2 left axilla, [phone]  Fax [phone]    Home health care agency's  to evaluate patient two weeks prior to discharge from home health to determine post-discharge services. / signature: Electronically signed by Anni West RN on 7/27/19 at 2:44 PM    PHYSICIAN SECTION    Prognosis: Fair    Condition at Discharge: Stable    Rehab Potential (if transferring to Rehab): Fair    Recommended Labs or Other Treatments After Discharge: none    Physician Certification: I certify the above information and transfer of Nettie Vázquez  is necessary for the continuing treatment of the diagnosis listed and that she requires [facility] for less 30 days.      Update Admission H&P: No change in H&P    PHYSICIAN SIGNATURE:  Electronically signed by Reece Rees MD on 8/1/19 at 6:11 PM

## 2019-07-29 PROBLEM — R10.84 GENERALIZED ABDOMINAL PAIN: Status: ACTIVE | Noted: 2019-01-01

## 2019-07-29 NOTE — FLOWSHEET NOTE
07/29/19 1918   Encounter Summary   Services provided to: Patient   Referral/Consult From: Rounding   Continue Visiting   (7/29/19)   Complexity of Encounter Low   Length of Encounter 15 minutes   Spiritual Assessment Completed Yes   Spiritual/Shinto   Type Spiritual support   Assessment Approachable; Hopeful;Coping   Intervention Prayer;Sustaining presence/ Ministry of presence; Discussed illness/injury and it's impact   Outcome Expressed gratitude;Coping; Hopeful;Receptive

## 2019-07-29 NOTE — PLAN OF CARE
PRE CONSULT ROUNDING NOTE  HPI  77year old female with pmh of HTN, bells palsy, anemia, chronic pancreatitis, CKD, breast cancer tx with surgery in 2016, benign stomach tumor removed several years ago who presented to the ED for left leg weakness; diagnosed with a TIA. Our service was consulted for abdominal pain. Reports mid abdominal non radiating abdominal pain for one week. No nausea, emesis, diarrhea or fevers. Pain is not worse with eating; she has not had weight loss or dysphagia. CT shows left adnexal mass, right renal cyst. Labs show creat 1.23, few bacteria in the urine. She is on plavix for a previous DVT. Endoscopy none  Family no hx of colon cancer  Social smokes cigarettes, rare etoh use  BP (!) 146/84   Pulse 61   Temp 97.9 °F (36.6 °C) (Oral)   Resp 16   Ht 5' 3\" (1.6 m)   Wt 156 lb 1.4 oz (70.8 kg)   SpO2 99%   BMI 27.65 kg/m²     ROS meds labs imaging and past medical records were reviewed    Exam  A&OX3 appropriate  [id number]  Lungs clear bilaterally  BSX4 active non distended obese non tender  Plus 3-4 edema to the right lower leg (hx lymphedema)  No jaundice    Assessment  Abdominal pain with ct showing left adnexal mass    Plan  Will obtain pelvic US  Discussed EGD with Dr Pierre Gonzalez; this will likely occur Wednesday   Pain mgt per primary service  Formal gi consult to follow   . Mari Gutierrez, APRN - CNP

## 2019-07-29 NOTE — PROGRESS NOTES
Dysfunction   Pharyngeal Phase  Pharyngeal Phase: WNL    Prognosis  Individuals consulted  Consulted and agree with results and recommendations: Patient    Education  Patient Education: yes  Patient Education Response: Verbalizes understanding                 Therapy Time  SLP Individual Minutes  Time In: 5058  Time Out: 7571  Minutes: 13            Loyce Goodpasture, SLP M.A., [id number] [place] [address]  7/29/2019 11:23 AM

## 2019-07-29 NOTE — PLAN OF CARE
Problem: Falls - Risk of:  Goal: Will remain free from falls  Description  Will remain free from falls  7/29/2019 1051 by Mike Ribeiro RN  Outcome: Ongoing   No falls this shift  Problem: Falls - Risk of:  Goal: Absence of physical injury  Description  Absence of physical injury  7/29/2019 1051 by Mike Ribeiro RN  Outcome: Ongoing   No injury this shift. Problem: Pain:  Goal: Pain level will decrease  Description  Pain level will decrease  7/29/2019 1051 by Mike Ribeiro RN  Outcome: Ongoing   Pt currently denies pain this shift. Problem: HEMODYNAMIC STATUS  Goal: Patient has stable vital signs and fluid balance  7/29/2019 1051 by Mike Ribeiro RN  Outcome: Ongoing   Vitals are currently stable. Will continue to monitor. Problem: ACTIVITY INTOLERANCE/IMPAIRED MOBILITY  Goal: Mobility/activity is maintained at optimum level for patient  7/29/2019 1051 by Mike Ribeiro RN  Outcome: Ongoing   Pt up with walker and standby assist. PT and OT consults to be completed today.

## 2019-07-29 NOTE — PROGRESS NOTES
Physical Therapy    Facility/Department: [facility] PROGRESSIVE CARE  Initial Assessment    NAME: Daniel Dillard  : 1940  MRN: [id number]    Date of Service: 2019    Discharge Recommendations:  Subacute/Skilled Nursing Facility   PT Equipment Recommendations  Equipment Needed: No    Assessment   Body structures, Functions, Activity limitations: Decreased functional mobility ; Decreased endurance;Decreased strength;Decreased balance  Assessment: continue per POC to maxmize potential prior to D/C; therapist would recommend SNF at D/C for further therapy. Pt has 5 steps w/ only 1 rail to enter home. Pt C/O weakness w/ standing and walking. Treatment Diagnosis: weakness  Specific instructions for Next Treatment: 19 SNF; gait w/ w walker 30' x 2 w/ CGA x 1, FALL RISK  Prognosis: Good  Decision Making: Medium Complexity  History: admitted due to TIA symptoms  Exam: ROM, MMT, balance and mobility assessments  Clinical Presentation: gait w/ w walker 30' x 2 w/ CGA x 1, FALL RISK  Barriers to Learning: none  REQUIRES PT FOLLOW UP: Yes  Activity Tolerance  Activity Tolerance: Patient limited by fatigue;Patient limited by endurance       Patient Diagnosis(es): The encounter diagnosis was TIA (transient ischemic attack). has a past medical history of Abnormal kidney function, Acid reflux disease, Anesthesia, Arthritis, Bell's palsy, Cancer ([address] Cerebrovascular disease, COPD (chronic obstructive pulmonary disease) ([address] Deep vein thrombosis (DVT) ([facility] [address] Edema, History of cancer of left breast, Hx of blood clots, Hyperlipidemia, Hypertension, Pancreatitis, Wears dentures, and Wears glasses. has a past surgical history that includes Cholecystectomy (); Hysterectomy; Stomach surgery; Total knee arthroplasty (Right); Tubal ligation;  Appendectomy; Breast lumpectomy (Left, 2016); pr sono guide needle biopsy (2016); and Mastectomy (Left, Tub/Shower unit, Curtain  Bathroom Toilet: Standard  Home Equipment: Rolling walker, Cane  ADL Assistance: Independent  Homemaking Assistance: Needs assistance(dtr Judith is primary)  Homemaking Responsibilities: No  Ambulation Assistance: Independent(uses s cane )  Transfer Assistance: Independent  Active : No  Mode of Transportation: Family, Car  Occupation: Retired(retired nurse's aid)  Additional Comments: dtr works as a nurse's aid - works 2nd and 3rd shifts- sleeps during the day, pt states that she can call her other dtr for assistance if needed  Cognition        Objective     Observation/Palpation  Observation: peripheral IV right wrist  Edema: severe lymphedema bilateral LEs (right > left)    AROM RLE (degrees)  RLE AROM: WFL  RLE General AROM: except dorsiflexion to neutral  AROM LLE (degrees)  LLE AROM : WFL  LLE General AROM: except dorsiflexion 0-5  AROM RUE (degrees)  RUE General AROM: see OT for UE assessment  AROM LUE (degrees)  LUE General AROM: see OT for UE assessment  Strength RLE  Comment: hip flexors 3+/5 otherwise 4-/5  Strength LLE  Comment: hip flexors 3+/5 otherwise 4-/5  Strength RUE  Comment: see OT for UE assessment  Strength LUE  Comment: see OT for UE assessment     Sensation  Overall Sensation Status: WFL(denies)  Bed mobility  Rolling to Left: Independent  Supine to Sit: Independent  Scooting: Independent  Comment: dangled at the EOB independently  Transfers  Sit to Stand: Contact guard assistance  Stand to sit: Contact guard assistance  Bed to Chair: Contact guard assistance(used w walker)  Ambulation  Ambulation?: Yes  Ambulation 1  Surface: level tile  Device: Rolling Walker  Assistance: Contact guard assistance  Distance: 30' x 2  Comments: C/O unsteadiness/ weakness w/ prolonged standing and walking, therapist changed to smaller w walker in the hallway as w walker in her room would not adjust low enough  Stairs/Curb  Stairs?: No     Balance  Sitting - Static: Good  Sitting - Dynamic: Good  Standing - Static: Good;-(used w walker)  Standing - Dynamic: Fair;+(used w walker)        Plan   Plan  Times per week: 5-7 treatments/ week  Times per day: (5-7 treatments/ week)  Specific instructions for Next Treatment: 7-29-19 SNF; gait w/ w walker 30' x 2 w/ CGA x 1, FALL RISK  Current Treatment Recommendations: Strengthening, Gait Training, Patient/Caregiver Education & Training, Stair training, Balance Training, Functional Mobility Training, Endurance Training, Transfer Training, Safety Education & Training  Safety Devices  Type of devices:  All fall risk precautions in place, Left in chair, Call light within reach, Gait belt, Patient at risk for falls, Nurse notified(nurse Yaneth Harrison)    G-Code       OutComes Score                                                  AM-PAC Score  AM-PAC Inpatient Mobility Raw Score : 18 (07/29/19 0956)  AM-PAC Inpatient T-Scale Score : 43.63 (07/29/19 0956)  Mobility Inpatient CMS 0-100% Score: 46.58 (07/29/19 0956)  Mobility Inpatient CMS G-Code Modifier : CK (07/29/19 0956)          Goals          Therapy Time   Individual Concurrent Group Co-treatment   Time In 0956         Time Out 1023         Minutes 27         Timed Code Treatment Minutes: [facility], PT

## 2019-07-29 NOTE — PROGRESS NOTES
Talked with Dr. Ilsa Shen about elevated blood pressures. Give metoprolol 5 mg ivp q6h prn for sbp greater than 160.

## 2019-07-29 NOTE — PROGRESS NOTES
elevation of peak systolic and end  Severe spectral broadening of the diastolic velocities. right internal carotid artery  without elevation of peak         The vertebral artery is patent with  systolic and end diastolic        antegrade flow. velocities. The vertebral artery is patent  with antegrade flow. Allergies   - Allergy:Natural rubber and latex(Miscellaneous). - Allergy:Aspirin(Drug). Velocities are measured in cm/s ; Diameters are measured in cm Carotid Right Measurements +------------+--------+--------+--------+-------+------------+-------------+ ! Location    ! PSV     ! EDV     ! Angle   ! RI     !%Stenosis   ! Tortuosity   ! +------------+--------+--------+--------+-------+------------+-------------+ ! Prox CCA    !66.99   !11.99   !        !0.82   !            !             ! +------------+--------+--------+--------+-------+------------+-------------+ ! Mid CCA     !65.89   !16.39   !        !0.75   !            !             ! +------------+--------+--------+--------+-------+------------+-------------+ ! Dist CCA    !60.12   !10.38   !        !0.83   !            !             ! +------------+--------+--------+--------+-------+------------+-------------+ ! Bulb        !44.41   !9.51    !        !0.79   !            !             ! +------------+--------+--------+--------+-------+------------+-------------+ ! Prox ICA    !32.94   !8.19    !        !0.75   !            !             ! +------------+--------+--------+--------+-------+------------+-------------+ ! Prox ECA    !94.48   !14.19   !        !0.85   !            !             ! +------------+--------+--------+--------+-------+------------+-------------+ ! Vertebral   !37.7    !10.62   !        !0.72   !            !             ! +------------+--------+--------+--------+-------+------------+-------------+   - There is antegrade vertebral flow noted on the right side. - Additional Measurements:ICAPSV/CCAPSV 0.49. ICAEDV/CCAEDV 0.68. Carotid Left Measurements +-----------+--------+-------+-------+------+-----------+------------------+ ! Location   ! PSV     ! EDV    ! Angle  ! RI    !%Stenosis  ! Tortuosity        ! +-----------+--------+-------+-------+------+-----------+------------------+ ! Prox CCA   !63.14   !15.39  !       !0.76  !           !                  ! +-----------+--------+-------+-------+------+-----------+------------------+ ! Mid CCA    !68.13   !14.68  !       !0.78  !           !                  ! +-----------+--------+-------+-------+------+-----------+------------------+ ! Dist CCA   !59.57   !14.68  !       !0.75  !           !                  ! +-----------+--------+-------+-------+------+-----------+------------------+ ! Bulb       !56.72   !11.12  !       !0.8   ! !                  ! +-----------+--------+-------+-------+------+-----------+------------------+ ! Prox ICA   !37.48   !9.69   !       !0.74  !           !                  ! +-----------+--------+-------+-------+------+-----------+------------------+ ! Mid ICA    !71.48   !23.02  !       !0.68  !           !                  ! +-----------+--------+-------+-------+------+-----------+------------------+ ! Prox ECA   !121.32  !8.55   !       !0.93  !           !                  ! +-----------+--------+-------+-------+------+-----------+------------------+ ! Vertebral  !37.27   !8.77   !       !0.76  !           !                  ! +-----------+--------+-------+-------+------+-----------+------------------+   - There is antegrade vertebral flow noted on the left side. - Additional Measurements:ICAPSV/CCAPSV 1.13. ICAEDV/CCAEDV 1.5. Assessment :   1. Abdominal pain/gi to see  2. See order     Plan:   1. Will advise pt to complaint with meds  .       Patient Active Problem List:     Chronic pancreatitis [facility])     Essential hypertension     Hyperlipidemia     Psoriasis     Gastroesophageal reflux disease without esophagitis     Bell's palsy Chronic deep vein thrombosis (DVT) of both lower extremities (HCC)     Tobacco abuse     History of alcohol abuse     Malignant neoplasm of upper-outer quadrant of left female breast (HCC)     Obesity (BMI 30.0-34. 9)     Anemia associated with acute blood loss     Hematoma (nontraumatic) of breast     Local infection of skin and subcutaneous tissue     Pseudomonas aeruginosa infection     Anorexia     Orthostatic dizziness     Hyperkalemia     Acute kidney injury ([address]     Alcohol-induced chronic pancreatitis (HCC)     Delayed surgical wound healing     Cellulitis     Anemia     Noncompliance     CKD (chronic kidney disease) stage 3, GFR 30-59 ml/min (HCC)     Iron deficiency anemia     TIA (transient ischemic attack)      Anticipated Disposition upon discharge: [] Home                                                                         [] Home with Home Health                                                                         [] [facility]                                                                         [] [address]      Patient is admitted as inpatient status because of co-morbidities listed above, severity of signs and symptoms as outlined, requirement for current medical therapies and most importantly because of direct risk to patient if care not provided in a hospital setting.           Nba Hernandez MD  [facility] Hospitalist

## 2019-07-29 NOTE — PROGRESS NOTES
and demonstrated understanding     Plan  Safety Devices  Safety Devices in place: Yes  Type of devices: Left in bed, Call light within reach, Bed alarm in place  Plan  Times per week: 5-7  Times per day: Daily  Current Treatment Recommendations: Strengthening, Endurance Training, Patient/Caregiver Education & Training, Self-Care / ADL, Balance Training, Home Management Training, Functional Mobility Training, Safety Education & Training, Positioning      Goals  Short term goals  Time Frame for Short term goals: 1 week  Short term goal 1: pt to demo knowledge re AE recommended and how to obtain (picture provided)  Short term goal 2: pt to state her plans to manage lyphedema at home, pt to verbalize risks of past year not wearing compression garments for this  Short term goal 3: pt to sam 10 reps L shld flex and abd with min resistance for strengthening  Short term goal 4: pt indep with HEP for LUE strengthening  Short term goal 5: pt to sam 8-10 min stand, amb for adls or ex with SBA and no LOB    OT Individual Minutes  Time In: 8183  Time Out: [address]  Minutes: 51      Electronically signed by Janell Lang OT on 7/29/19 at 5:10 PM

## 2019-07-30 NOTE — PROGRESS NOTES
organomegaly  Extremities: extremities normal, atraumatic, no cyanosis or edema  Neurologic: Mental status: Alert, oriented, thought content appropriate    Prophylaxis:   DVT with  [x] lovenox        [] heparin        [] Scd        [] none:     Radiology:  Ct Abdomen Pelvis Wo Contrast Additional Contrast? None    Result Date: 7/27/2019  EXAMINATION: CT OF THE ABDOMEN AND PELVIS WITHOUT CONTRAST 7/27/2019 3:44 pm TECHNIQUE: CT of the abdomen and pelvis was performed without the administration of intravenous contrast. Multiplanar reformatted images are provided for review. Dose modulation, iterative reconstruction, and/or weight based adjustment of the mA/kV was utilized to reduce the radiation dose to as low as reasonably achievable. COMPARISON: September 8, 2008 CT abdomen pelvis HISTORY: ORDERING SYSTEM PROVIDED HISTORY: ABDOMINAL PAIN TECHNOLOGIST PROVIDED HISTORY: Reason for Exam: Abdominal pain Acuity: Unknown Type of Exam: Unknown Additional signs and symptoms: PT STATES INTERMITTENT UPPER ABDOMINAL PAIN, RIGHT LEG SWELLING Relevant Medical/Surgical History: SURGERIES-APPENDECTOMY, CHOLECYSTECTOMY, HYSTERECTOMY, MASS REMOVED FROM STOMACH, TUBAL LIGATION FINDINGS: Lower Chest: Trace pericardial effusion and calcific coronary artery disease. Organs: The abdominal wall appears normal. The liver, spleen, pancreas, and adrenals appear normal.  Gallbladder surgically absent. Pneumobilia. 30 mm simple right renal cortical cyst.  Punctate nonobstructing nephrolith on the left. The bladder appears normal. GI/Bowel: The stomach,small bowel, and colon appear normal. Normal appendix. Pelvis: 34 mm left adnexal mass. Uterus normal. Peritoneum/Retroperitoneum: The abdominal aorta and iliac arteries are normal in caliber. There is no pathologic adenopathy. Bones/Soft Tissues: Normal     Left adnexal mass. Pelvic ultrasound recommended. Otherwise no acute disease.      Xr Knee Left (1-2 Views)    Result Date: elevation of peak systolic and end  Severe spectral broadening of the diastolic velocities. right internal carotid artery  without elevation of peak         The vertebral artery is patent with  systolic and end diastolic        antegrade flow. velocities. The vertebral artery is patent  with antegrade flow. Allergies   - Allergy:Natural rubber and latex(Miscellaneous). - Allergy:Aspirin(Drug). Velocities are measured in cm/s ; Diameters are measured in cm Carotid Right Measurements +------------+--------+--------+--------+-------+------------+-------------+ ! Location    ! PSV     ! EDV     ! Angle   ! RI     !%Stenosis   ! Tortuosity   ! +------------+--------+--------+--------+-------+------------+-------------+ ! Prox CCA    !66.99   !11.99   !        !0.82   !            !             ! +------------+--------+--------+--------+-------+------------+-------------+ ! Mid CCA     !65.89   !16.39   !        !0.75   !            !             ! +------------+--------+--------+--------+-------+------------+-------------+ ! Dist CCA    !60.12   !10.38   !        !0.83   !            !             ! +------------+--------+--------+--------+-------+------------+-------------+ ! Bulb        !44.41   !9.51    !        !0.79   !            !             ! +------------+--------+--------+--------+-------+------------+-------------+ ! Prox ICA    !32.94   !8.19    !        !0.75   !            !             ! +------------+--------+--------+--------+-------+------------+-------------+ ! Prox ECA    !94.48   !14.19   !        !0.85   !            !             ! +------------+--------+--------+--------+-------+------------+-------------+ ! Vertebral   !37.7    !10.62   !        !0.72   !            !             ! +------------+--------+--------+--------+-------+------------+-------------+   - There is antegrade vertebral flow noted on the right side. - Additional Measurements:ICAPSV/CCAPSV 0.49. ICAEDV/CCAEDV

## 2019-07-30 NOTE — PROGRESS NOTES
09/12/2016). Restrictions  Restrictions/Precautions  Restrictions/Precautions: Fall Risk(peripheral IV right wrist)  Required Braces or Orthoses?: No  Implants present? : Metal implants(right TKR)  Position Activity Restriction  Other position/activity restrictions: (P) NO BP OR LAB DRAWS IN LEFT UE  Subjective   General  Additional Pertinent Hx: (P) hx Bell's palsy 30 years ago  Response To Previous Treatment: (P) Not applicable  Family / Caregiver Present: (P) No  Referring Practitioner: (P) Dr. Alem Clemons  Subjective  Subjective: Pt report feeling very sleepy today, agreeable to therapy. Pt. stated that she would like to return to bed after therapy. General Comment  Comments: Pt very pleasant and cooperative. Pain Screening  Patient Currently in Pain: (P) Yes  Pain Assessment  Pain Assessment: 0-10  Pain Level: 0  Response to Pain Intervention: Patient Satisfied  Vital Signs  Patient Currently in Pain: (P) Yes       Orientation  Orientation  Overall Orientation Status: Within Normal Limits  Cognition      Objective   Bed mobility  Scooting: Independent  Transfers  Sit to Stand: Contact guard assistance  Stand to sit: Contact guard assistance  Bed to Chair: Contact guard assistance(w/ RW)  Ambulation  Ambulation?: Yes  Ambulation 1  Surface: level tile  Device: Rolling Walker  Assistance: Contact guard assistance  Quality of Gait: Slow and steady  Gait Deviations: Slow Sybil  Distance: 40' x 2  Comments: Pt report weakness in both L/E.   Stairs/Curb  Stairs?: No     Balance  Sitting - Static: Good  Sitting - Dynamic: Good  Standing - Static: Good;-(w/RW)  Standing - Dynamic: Fair;+(w/RW)  Other exercises  Other exercises?: Yes  Other exercises 1: Ther ex both L/E supine and seated including MOD (Lime T-band x 10  Other exercises 2: Tfher ex both U/E w/ MOD (Lime) T-band x 10(I/S pt to rest as prn.)         Other Activities: Other (see comment)(Sit>Stand x 3)              G-Code     OutComes Score AM-PAC Score             Goals       Plan    Plan  Times per week: 5-7 treatments/ week  Times per day: (5-7 treatments/ week)  Specific instructions for Next Treatment: 7-29-19 SNF; gait w/ w walker 30' x 2 w/ CGA x 1, FALL RISK  Current Treatment Recommendations: Strengthening, Gait Training, Patient/Caregiver Education & Training, Stair training, Balance Training, Functional Mobility Training, Endurance Training, Transfer Training, Safety Education & Training  Safety Devices  Type of devices:  All fall risk precautions in place, Left in chair, Call light within reach, Gait belt, Patient at risk for falls, Nurse notified(nurse Radha Funez)     Therapy Time   Individual Concurrent Group Co-treatment   Time In 0940         Time Out [address] [place] [address]   Electronically signed by Muna Castro PTA on 7/30/2019 at 2:34 PM

## 2019-07-30 NOTE — CONSULTS
Gastroenterology Consult Note      Patient: Kali Dillard  : 1940  Acct#:  [id number]     Date:  2019    Subjective:       History of Present Illness  Patient is a 77 y.o.  female admitted with TIA (transient ischemic attack) [G45.9] who is seen in consult for Abdominal pain  Patient is elderly with multiple medical issues, history of hypertension Bell's palsy anemia, chronic pancreatitis, chronic kidney disease, breast cancer treatment with surgery, benign tumor removed from the stomach several years ago,    Came to the emergency room complaining of being fatigued and weak diagnosed with TIA, we were consulted because of abdominal pain in the middle of the abdomen no radiation pain going on for 1 week, no nausea no vomiting no diarrhea no fever no chills. No odynophagia no dysphagia. Pain worse after eating. No weight loss. CT showed left adnexal mass  Patient on Plavix for DVT  Normal liver enzymes  No leukocytosis  Hemoglobin 12.2, but low MCV MCH           Past Medical History:   Diagnosis Date    Abnormal kidney function     PT. RELATES UNAWARE OF.  Acid reflux disease     Anesthesia     'needs to be asleep when ET tube removed due  to severe gastric reflux will make me have hard time breathing. \"     Arthritis     Bell's palsy     Cancer ([address]     Breast, lt.  Cerebrovascular disease     pt denies stroke she says she had bells palsy    COPD (chronic obstructive pulmonary disease) ([address]     patient is unaware    Deep vein thrombosis (DVT) ([address]     multiple    Edema     RT. LEG.  History of cancer of left breast     Hx of blood clots     dvt scott. legs.     Hyperlipidemia     Hypertension     Pancreatitis     Wears dentures     UPPER    Wears glasses       Past Surgical History:   Procedure Laterality Date    APPENDECTOMY      BREAST LUMPECTOMY Left 2016    with sentinel lymph node dissection    CHOLECYSTECTOMY      HYSTERECTOMY partial    MASTECTOMY Left 09/12/2016    UT SONO GUIDE NEEDLE BIOPSY  06/2016    STOMACH SURGERY      \"MASS REMOVED ,( BENIGN)\"    TOTAL KNEE ARTHROPLASTY Right     TUBAL LIGATION        Past Endoscopic History none    Admission Meds  No current facility-administered medications on file prior to encounter. Current Outpatient Medications on File Prior to Encounter   Medication Sig Dispense Refill    clobetasol (TEMOVATE) 0.05 % cream Apply topically 2 times daily. 45 g 1    clopidogrel (PLAVIX) 75 MG tablet 1 tab once daily 90 tablet 1    hydrALAZINE (APRESOLINE) 50 MG tablet Take 1 tablet by mouth 2 times daily 180 tablet 1    carvedilol (COREG) 25 MG tablet Take 1 tablet by mouth 2 times daily (with meals) 180 tablet 1    furosemide (LASIX) 20 MG tablet Take 1 tablet by mouth 2 times daily 180 tablet 1    diclofenac sodium (VOLTAREN) 1 % GEL Apply 2 g topically 2 times daily 100 g 3    pantoprazole (PROTONIX) 40 MG tablet Take 1 tablet by mouth daily 90 tablet 1    simvastatin (ZOCOR) 40 MG tablet Take 40 mg by mouth nightly      acetaminophen (APAP EXTRA STRENGTH) 500 MG tablet Take 1 tablet by mouth every 6 hours as needed for Pain 40 tablet 3       Patient   Does Use ASA, NSAID No  Allergies  Allergies   Allergen Reactions    Latex Hives    Asa [Aspirin] Nausea Only     Stomach pain        Social   Social History     Tobacco Use    Smoking status: Current Every Day Smoker     Packs/day: 1.00     Years: 54.00     Pack years: 54.00    Smokeless tobacco: Never Used   Substance Use Topics    Alcohol use: Yes     Comment: 4 times a year of alcohol use        PSYCH HISTORY:  Depression No  Anxiety No  Suicide No       Family History   Problem Relation Age of Onset    High Blood Pressure Mother     Heart Disease Mother     Other Father         TB      No family history of colon cancer, Crohn's disease, or ulcerative colitis.      Review of Systems  Constitutional: negative  Eyes: input(s): PTT in the last 72 hours. No results for input(s): OCCULTBLD in the last 72 hours. CEA:  No results found for: CEA  Ca 125:  No results found for:   Ca 19-9:  No results found for:   Ca 15-3:  No results found for:   AFP:  No components found for: AFAFP  Beta HCG:  No components found for: BHCG  Neuron Specific Enolase:  No results found for: NSE  Imaging Studies:                           All appropriate imaging studies and reports reviewed: Yes                 Assessment:     Active Problems:    TIA (transient ischemic attack)    Generalized abdominal pain    Adnexal mass  Resolved Problems:    * No resolved hospital problems. *    Abdominal pain  CT showing adnexal mass in the left    Recommendations:     Pelvic us  Evaluate the adnexal mass  EGD colonoscopy later if the pelvic ultrasound is negative  Monitor labs  Rehydrate                        Thank you for allowing me to participate in the care of your patient. Please feel free to contact me with any questions or concerns.      Mundo Brooks MD

## 2019-07-30 NOTE — PROGRESS NOTES
of outside   ultrasound, this is grossly stable compared to 09/27/2015.  If prior   ultrasound is provided for review, then an addendum will be issued at this   time. 2. Status post hysterectomy. 3. The right ovary is not seen.  No gross right adnexal mass.               FINDINGS:ct abd 7/27/19   Lower Chest: Trace pericardial effusion and calcific coronary artery disease.       Organs:       The abdominal wall appears normal.       The liver, spleen, pancreas, and adrenals appear normal.  Gallbladder   surgically absent.  Pneumobilia.       30 mm simple right renal cortical cyst.  Punctate nonobstructing nephrolith   on the left.       The bladder appears normal.       GI/Bowel: The stomach,small bowel, and colon appear normal. Normal appendix.       Pelvis: 34 mm left adnexal mass.  Uterus normal.       Peritoneum/Retroperitoneum: The abdominal aorta and iliac arteries are normal   in caliber. There is no pathologic adenopathy.       Bones/Soft Tissues: Normal           Impression   Left adnexal mass.  Pelvic ultrasound recommended.  Otherwise no acute   disease.               ASSESSMENT/PLAN:  1. Abdominal pain; left ovarian mass  - discussed EGD/colonoscopy with Dr Patricia Ruiz will plan for tomorrow  Npo after midnight, prep ordered  Hold the plavix if ok with primary service  -recc f/u with GYN for mass        This plan was formulated in collaboration with Dr. Patricia Ruiz. Electronically signed by: OTTO Alves CNP on 7/30/2019 at 8:05 AM     Attending Physician Statement  I have discussed the care of [facility] LIVIA Dillard and   I have examined the patient myselft independently, and taken ros and hpi , including pertinent history and exam findings,  with the author of this note . I have reviewed the key elements of all parts of the encounter with the nurse practitioner/resident. I agree with the assessment, plan and orders as documented by the above health care provider   Addition/summary:     We will

## 2019-07-30 NOTE — PLAN OF CARE
Problem: Falls - Risk of:  Goal: Absence of physical injury  Description  Absence of physical injury  Outcome: Met This Shift     Problem: Pain:  Goal: Control of acute pain  Description  Control of acute pain  Outcome: Met This Shift     Problem: Pain:  Goal: Control of chronic pain  Description  Control of chronic pain  Outcome: Met This Shift     Problem: Falls - Risk of:  Goal: Will remain free from falls  Description  Will remain free from falls  Outcome: Ongoing  Note:   Patient up with standby assistance and walker. Uses call light appropriately and no attempts to get out of bed. Unsteady on feet. Problem: Pain:  Goal: Pain level will decrease  Description  Pain level will decrease  Outcome: Ongoing  Note:   Minimal complaints of pain. Repositioned and relaxation tech utilized. Up to chair. Problem: HEMODYNAMIC STATUS  Goal: Patient has stable vital signs and fluid balance  Outcome: Ongoing  Note:   Vital signs improved. Systolic less than 487 throughout shift. Labs stable, will repeat yesterday. Problem: ACTIVITY INTOLERANCE/IMPAIRED MOBILITY  Goal: Mobility/activity is maintained at optimum level for patient  Outcome: Ongoing  Note:   Activity level increased, Tolerated well.  Ambulated in hallways with PT.

## 2019-07-30 NOTE — FLOWSHEET NOTE
Patient BP remained above 049 systolic despite ordered and PRN treatments. New orders given. Dose increase for hydralazine to 200 mg twice a day. PRN hydralazine given once.

## 2019-07-31 NOTE — PROGRESS NOTES
Hospitalist Progress Note  7/31/2019 5:50 PM  Subjective:   Admit Date: 7/27/2019  PCP: Delmar Orosco MD     Full Code      C/c:  Chief Complaint   Patient presents with    Knee Pain    Leg Pain         Interval History: pt c scope was cancelled due to poor prep,     Diet: DIET CLEAR LIQUID;  Diet NPO Time Specified                                ip days:2  Medications:   Scheduled Meds:   hydrALAZINE  100 mg Oral BID    sucralfate  1 g Oral 4 times per day    sodium chloride flush  10 mL Intravenous 2 times per day    enoxaparin  40 mg Subcutaneous Daily    carvedilol  25 mg Oral BID WC    [Held by provider] clopidogrel  75 mg Oral Daily    diclofenac sodium  2 g Topical BID    furosemide  20 mg Oral BID    pantoprazole  40 mg Oral Daily    simvastatin  40 mg Oral Nightly     Continuous Infusions:  PRN Meds:.metoprolol, sodium chloride flush, acetaminophen, acetaminophen     CBC:   Recent Labs     07/31/19  1312   WBC 3.6   HGB 11.8*        BMP:    Recent Labs     07/31/19  1312      K 3.7      CO2 26   BUN 20   CREATININE 1.44*   GLUCOSE 104*     Hepatic: No results for input(s): AST, ALT, ALB, BILITOT, ALKPHOS in the last 72 hours. Troponin: No results for input(s): TROPONINI in the last 72 hours. BNP: No results for input(s): BNP in the last 72 hours. Lipids: No results for input(s): CHOL, HDL in the last 72 hours. Invalid input(s): LDLCALCU  INR: No results for input(s): INR in the last 72 hours.     Objective:   Vitals: BP (!) 141/65   Pulse 57   Temp 98.1 °F (36.7 °C) (Oral)   Resp 14   Ht 5' 3\" (1.6 m)   Wt 155 lb 6.8 oz (70.5 kg)   SpO2 96%   BMI 27.53 kg/m²   General appearance: alert, appears stated age and cooperative  Skin: Skin color, texture, turgor normal. No rashes or lesions  Lungs: clear to auscultation bilaterally  Heart: regular rate and rhythm, S1, S2 normal, no murmur, click, rub or gallop  Abdomen: soft, non-tender; bowel sounds normal; no masses,  no organomegaly  Extremities: extremities normal, atraumatic, no cyanosis or edema  Neurologic: Mental status: Alert, oriented, thought content appropriate    Prophylaxis:   DVT with  [] lovenox        [] heparin        [] Scd        [x] none:     Radiology:  Ct Abdomen Pelvis Wo Contrast Additional Contrast? None    Result Date: 7/27/2019  EXAMINATION: CT OF THE ABDOMEN AND PELVIS WITHOUT CONTRAST 7/27/2019 3:44 pm TECHNIQUE: CT of the abdomen and pelvis was performed without the administration of intravenous contrast. Multiplanar reformatted images are provided for review. Dose modulation, iterative reconstruction, and/or weight based adjustment of the mA/kV was utilized to reduce the radiation dose to as low as reasonably achievable. COMPARISON: September 8, 2008 CT abdomen pelvis HISTORY: ORDERING SYSTEM PROVIDED HISTORY: ABDOMINAL PAIN TECHNOLOGIST PROVIDED HISTORY: Reason for Exam: Abdominal pain Acuity: Unknown Type of Exam: Unknown Additional signs and symptoms: PT STATES INTERMITTENT UPPER ABDOMINAL PAIN, RIGHT LEG SWELLING Relevant Medical/Surgical History: SURGERIES-APPENDECTOMY, CHOLECYSTECTOMY, HYSTERECTOMY, MASS REMOVED FROM STOMACH, TUBAL LIGATION FINDINGS: Lower Chest: Trace pericardial effusion and calcific coronary artery disease. Organs: The abdominal wall appears normal. The liver, spleen, pancreas, and adrenals appear normal.  Gallbladder surgically absent. Pneumobilia. 30 mm simple right renal cortical cyst.  Punctate nonobstructing nephrolith on the left. The bladder appears normal. GI/Bowel: The stomach,small bowel, and colon appear normal. Normal appendix. Pelvis: 34 mm left adnexal mass. Uterus normal. Peritoneum/Retroperitoneum: The abdominal aorta and iliac arteries are normal in caliber. There is no pathologic adenopathy. Bones/Soft Tissues: Normal     Left adnexal mass. Pelvic ultrasound recommended. Otherwise no acute disease.      Xr Knee Left (1-2 Views)    Result Date: or extra-axial fluid collection. ORBITS: The visualized portion of the orbits demonstrate no acute abnormality. SINUSES: The visualized paranasal sinuses and mastoid air cells demonstrate no acute abnormality. Mild mucosal thickening in the right sphenoid locule. SOFT TISSUES/SKULL:  No acute abnormality of the visualized skull or soft tissues. No acute intracranial abnormality. Chronic ischemic changes as above. Xr Chest Portable    Result Date: 7/27/2019  EXAMINATION: ONE XRAY VIEW OF THE CHEST 7/27/2019 1:13 am COMPARISON: June 17, 2017 HISTORY: ORDERING SYSTEM PROVIDED HISTORY: cp TECHNOLOGIST PROVIDED HISTORY: cp Reason for Exam: chest pain, leg pain Acuity: Unknown Type of Exam: Unknown FINDINGS: Shallow inspiration. Pulmonary vasculature is magnified by technique. No airspace consolidation, pleural effusion, or pneumothorax. Cardiomegaly. Calcified plaquing of the aorta. No focal airspace consolidation. Vl Carotid Bilateral    Result Date: 7/28/2019    [facility]  Vascular Carotid Procedure   Patient Name   FATIMAH  Date of Study           07/27/2019                 Faisal Choudhary   Date of Birth  1940   Gender                  Female   Age            77 year(s)   Race                    Black   Room Number    2086   Corporate ID # [id number]   Patient Acct # [de-identified]   MR #           [id number]       Sonographer             Kadeem Jim   Accession #    [id number]    Interpreting Physician  Chandni Mcneal   Referring                   Referring Physician     Jaydon Mdoi  Nurse  Practitioner  Procedure Type of Study:   Cerebral: Carotid, Carotid Scan Bilateral.  Indications for Study:Dizziness. Patient Status: In Patient. Technical Quality:Limited visualization. Conclusions   Summary   Mild 16-49% stenosis of the internal carotid arteries bilaterally. Patent vertebral arteries bilaterally with antegrade flow.    Signature ----------------------------------------------------------------  Electronically signed by Kadeem Jim(Sonographer) on  07/27/2019 01:00 PM  ----------------------------------------------------------------   ----------------------------------------------------------------  Electronically signed by Andrew Valenzuela(Interpreting  physician) on 07/28/2019 05:13 PM  ----------------------------------------------------------------  Findings:   Right Impression:                 Left Impression:  The common carotid artery has an  The common carotid artery has an  irregular fibrous plaque causing  irregular heterogeneous plaque causing a  a <50% stenosis based on          <50% stenosis. velocities. The carotid bulb has an irregular  The carotid bulb has an irregular calcific plaque causing a <50% stenosis. calcific plaque causing a <50%  stenosis. The external carotid artery was not                                    visualized in grayscale, however color  the external carotid artery was   and Doppler indicate a <50% stenosis  not well visualized in grayscale, based on velocities. however color and Doppler  indicate a <50% stenosis based on There is moderate spectral broadening of  velocities. the external carotid artery without peak                                    velocity elevation. There is severe spectral  broadening of the external        The internal carotid artery was not well  carotid artery without peak       visualized in grayscale due to calcific  velocity elevation. shadowing, however color and Doppler                                    imaging indicates a <50% stenosis based  The internal carotid artery has   on velocities. an irregular heterogeneous plaque  causing a <50% stenosis based on  Severe spectral broadening of the left  velocities.                        internal carotid artery without elevation of peak systolic and end  Severe spectral broadening of the diastolic velocities. right internal carotid artery  without elevation of peak         The vertebral artery is patent with  systolic and end diastolic        antegrade flow. velocities. The vertebral artery is patent  with antegrade flow. Allergies   - Allergy:Natural rubber and latex(Miscellaneous). - Allergy:Aspirin(Drug). Velocities are measured in cm/s ; Diameters are measured in cm Carotid Right Measurements +------------+--------+--------+--------+-------+------------+-------------+ ! Location    ! PSV     ! EDV     ! Angle   ! RI     !%Stenosis   ! Tortuosity   ! +------------+--------+--------+--------+-------+------------+-------------+ ! Prox CCA    !66.99   !11.99   !        !0.82   !            !             ! +------------+--------+--------+--------+-------+------------+-------------+ ! Mid CCA     !65.89   !16.39   !        !0.75   !            !             ! +------------+--------+--------+--------+-------+------------+-------------+ ! Dist CCA    !60.12   !10.38   !        !0.83   !            !             ! +------------+--------+--------+--------+-------+------------+-------------+ ! Bulb        !44.41   !9.51    !        !0.79   !            !             ! +------------+--------+--------+--------+-------+------------+-------------+ ! Prox ICA    !32.94   !8.19    !        !0.75   !            !             ! +------------+--------+--------+--------+-------+------------+-------------+ ! Prox ECA    !94.48   !14.19   !        !0.85   !            !             ! +------------+--------+--------+--------+-------+------------+-------------+ ! Vertebral   !37.7    !10.62   !        !0.72   !            !             ! +------------+--------+--------+--------+-------+------------+-------------+   - There is antegrade vertebral flow noted on the right side. - Additional Measurements:ICAPSV/CCAPSV 0.49. ICAEDV/CCAEDV

## 2019-07-31 NOTE — PROGRESS NOTES
PT slowly drinking bowel prep, RN educated the PT on the importance of finishing the prep. Will continue to encourage.

## 2019-07-31 NOTE — PLAN OF CARE
Pt refused to drink majority of colon prep last night, she has had 4 tap water enemas this morning and still has formed stool. D/w Dr Josephine Stiles, will cancel egd and colonoscopy today and reschedule for tomorrow after additional prep. RN notified . Tex Yanes, APRN - CNP

## 2019-07-31 NOTE — ANESTHESIA PRE PROCEDURE
quit: Not Answered  Counseling given: Not Answered      Vital Signs (Current):   Vitals:    07/31/19 0938 07/31/19 1044 07/31/19 1229 07/31/19 1601   BP: (!) 169/62  128/63 (!) 141/65   Pulse: 58 64 58 57   Resp: 16  16 14   Temp: 97.9 °F (36.6 °C)  97.9 °F (36.6 °C) 98.1 °F (36.7 °C)   TempSrc: Oral  Oral Oral   SpO2: 98%  97% 96%   Weight:       Height:                                                  BP Readings from Last 3 Encounters:   07/31/19 (!) 141/65   07/24/19 132/80   01/23/19 130/88       NPO Status:                                                                                 BMI:   Wt Readings from Last 3 Encounters:   07/30/19 155 lb 6.8 oz (70.5 kg)   07/24/19 155 lb (70.3 kg)   01/23/19 154 lb (69.9 kg)     Body mass index is 27.53 kg/m². CBC:   Lab Results   Component Value Date    WBC 3.6 07/31/2019    RBC 5.38 07/31/2019    HGB 11.8 07/31/2019    HCT 37.8 07/31/2019    MCV 70.3 07/31/2019    RDW 19.3 07/31/2019     07/31/2019       CMP:   Lab Results   Component Value Date     07/31/2019    K 3.7 07/31/2019     07/31/2019    CO2 26 07/31/2019    BUN 20 07/31/2019    CREATININE 1.44 07/31/2019    GFRAA 43 07/31/2019    LABGLOM 35 07/31/2019    GLUCOSE 104 07/31/2019    PROT 7.8 07/27/2019    CALCIUM 8.9 07/31/2019    BILITOT 0.24 07/27/2019    ALKPHOS 75 07/27/2019    AST 14 07/27/2019    ALT 7 07/27/2019       POC Tests: No results for input(s): POCGLU, POCNA, POCK, POCCL, POCBUN, POCHEMO, POCHCT in the last 72 hours.     Coags:   Lab Results   Component Value Date    PROTIME 12.7 07/27/2019    INR 1.0 07/27/2019    APTT 37.5 07/27/2019       HCG (If Applicable): No results found for: PREGTESTUR, PREGSERUM, HCG, HCGQUANT     ABGs: No results found for: PHART, PO2ART, [id number], QLR2KXD, BEART, Q9FLFGNJ     Type & Screen (If Applicable):  No results found for: LABABO, [address]    Anesthesia Evaluation  Patient summary reviewed and Nursing notes reviewed history of anesthetic

## 2019-07-31 NOTE — PROGRESS NOTES
soft, non-tender; bowel sounds normal; no masses,  no organomegaly  Extremities: extremities normal, atraumatic, no cyanosis or edema  Neurologic: Mental status: Alert, oriented, thought content appropriate    Prophylaxis:   DVT with  [] lovenox        [] heparin        [] Scd        [x] none:     Radiology:  Ct Abdomen Pelvis Wo Contrast Additional Contrast? None    Result Date: 7/27/2019  EXAMINATION: CT OF THE ABDOMEN AND PELVIS WITHOUT CONTRAST 7/27/2019 3:44 pm TECHNIQUE: CT of the abdomen and pelvis was performed without the administration of intravenous contrast. Multiplanar reformatted images are provided for review. Dose modulation, iterative reconstruction, and/or weight based adjustment of the mA/kV was utilized to reduce the radiation dose to as low as reasonably achievable. COMPARISON: September 8, 2008 CT abdomen pelvis HISTORY: ORDERING SYSTEM PROVIDED HISTORY: ABDOMINAL PAIN TECHNOLOGIST PROVIDED HISTORY: Reason for Exam: Abdominal pain Acuity: Unknown Type of Exam: Unknown Additional signs and symptoms: PT STATES INTERMITTENT UPPER ABDOMINAL PAIN, RIGHT LEG SWELLING Relevant Medical/Surgical History: SURGERIES-APPENDECTOMY, CHOLECYSTECTOMY, HYSTERECTOMY, MASS REMOVED FROM STOMACH, TUBAL LIGATION FINDINGS: Lower Chest: Trace pericardial effusion and calcific coronary artery disease. Organs: The abdominal wall appears normal. The liver, spleen, pancreas, and adrenals appear normal.  Gallbladder surgically absent. Pneumobilia. 30 mm simple right renal cortical cyst.  Punctate nonobstructing nephrolith on the left. The bladder appears normal. GI/Bowel: The stomach,small bowel, and colon appear normal. Normal appendix. Pelvis: 34 mm left adnexal mass. Uterus normal. Peritoneum/Retroperitoneum: The abdominal aorta and iliac arteries are normal in caliber. There is no pathologic adenopathy. Bones/Soft Tissues: Normal     Left adnexal mass. Pelvic ultrasound recommended. Otherwise no acute disease. elevation of peak systolic and end  Severe spectral broadening of the diastolic velocities. right internal carotid artery  without elevation of peak         The vertebral artery is patent with  systolic and end diastolic        antegrade flow. velocities. The vertebral artery is patent  with antegrade flow. Allergies   - Allergy:Natural rubber and latex(Miscellaneous). - Allergy:Aspirin(Drug). Velocities are measured in cm/s ; Diameters are measured in cm Carotid Right Measurements +------------+--------+--------+--------+-------+------------+-------------+ ! Location    ! PSV     ! EDV     ! Angle   ! RI     !%Stenosis   ! Tortuosity   ! +------------+--------+--------+--------+-------+------------+-------------+ ! Prox CCA    !66.99   !11.99   !        !0.82   !            !             ! +------------+--------+--------+--------+-------+------------+-------------+ ! Mid CCA     !65.89   !16.39   !        !0.75   !            !             ! +------------+--------+--------+--------+-------+------------+-------------+ ! Dist CCA    !60.12   !10.38   !        !0.83   !            !             ! +------------+--------+--------+--------+-------+------------+-------------+ ! Bulb        !44.41   !9.51    !        !0.79   !            !             ! +------------+--------+--------+--------+-------+------------+-------------+ ! Prox ICA    !32.94   !8.19    !        !0.75   !            !             ! +------------+--------+--------+--------+-------+------------+-------------+ ! Prox ECA    !94.48   !14.19   !        !0.85   !            !             ! +------------+--------+--------+--------+-------+------------+-------------+ ! Vertebral   !37.7    !10.62   !        !0.72   !            !             ! +------------+--------+--------+--------+-------+------------+-------------+   - There is antegrade vertebral flow noted on the right side.    - Additional Measurements:ICAPSV/CCAPSV palsy     Chronic deep vein thrombosis (DVT) of both lower extremities (HCC)     Tobacco abuse     History of alcohol abuse     Malignant neoplasm of upper-outer quadrant of left female breast (HCC)     Obesity (BMI 30.0-34. 9)     Anemia associated with acute blood loss     Hematoma (nontraumatic) of breast     Local infection of skin and subcutaneous tissue     Pseudomonas aeruginosa infection     Anorexia     Orthostatic dizziness     Hyperkalemia     Acute kidney injury ([address]     Alcohol-induced chronic pancreatitis (HCC)     Delayed surgical wound healing     Cellulitis     Anemia     Noncompliance     CKD (chronic kidney disease) stage 3, GFR 30-59 ml/min (HCC)     Iron deficiency anemia     TIA (transient ischemic attack)     Generalized abdominal pain     Adnexal mass      Anticipated Disposition upon discharge: [] Home                                                                         [] Home with Home Health                                                                         [] [facility]                                                                         [] [address] [facility]      Patient is admitted as inpatient status because of co-morbidities listed above, severity of signs and symptoms as outlined, requirement for current medical therapies and most importantly because of direct risk to patient if care not provided in a hospital setting.           Jose Auguste MD  Rounding Hospitalist

## 2019-07-31 NOTE — PROGRESS NOTES
[address]   INPATIENT OCCUPATIONAL THERAPY  PROGRESS NOTE  Date: 2019  Patient Name: Lynn Dillard      Room: 4891/5929-77  MRN: [id number]    : 1940  (74 y.o.) Gender: female     Discharge Recommendations:  Subacute/Skilled Nursing Facility(pt is unsteady, at risk for falls and needs assist to improve lymphedema, pt is agreeable to ST stay)  OT Equipment Recommendations  Equipment Needed: Yes  Other: extended tub bench, raised toilet seat, grab bars toilet and tub, reachers       Diagnosis: TIA, CT head negative for acute, chronic ischemic changes, L knee pain- xray negative for acute- OA, abdominal pain, GI ordered ultrasound, ST assess swallow- regular diet and thin liquids rec, severe untreated severe lymphedema BLE R worse than L  General  Patient assessed for rehabilitation services?: Yes  Additional Pertinent Hx: bells palsy 30 yrs ago  Diagnosis: TIA, CT head negative for acute, chronic ischemic changes, L knee pain- xray negative for acute- OA, abdominal pain, GI ordered ultrasound, ST assess swallow- regular diet and thin liquids rec, severe untreated severe lymphedema BLE R worse than L    Restrictions  Restrictions/Precautions: Fall Risk(peripheral IV right wrist)  Implants present? : Metal implants(right TKR)  Other position/activity restrictions: NO BP OR LAB DRAWS IN LEFT UE  Required Braces or Orthoses?: No      Subjective  Comments: Pt progressing; requiren SBA for t/f and mobility this date other than shower exit requring Franck 2* 'wet floor and not at home setup'.  Pt able to complete all ADLs  Patient Currently in Pain: Denies        Pain Assessment  Clinical Progression: Not changed  Response to Pain Intervention: Patient Satisfied    Objective     Bed mobility  Rolling to Left: Independent  Supine to Sit: Independent  Sit to Supine: Independent  Scooting: Independent  Balance  Sitting Balance: Independent  Standing Balance: Contact guard assistance(CGA

## 2019-07-31 NOTE — PROGRESS NOTES
RN educated pt on drinking an adequate amount of bowel prep if not all of it. Pt states \" Im drinking a little at a time, its nasty\" RN advises pt that if not clear by tomorrow then she will have to do even more prep. RN refilling cup with fresh ice every hour and noticed the previous cup still full. RN again educated pt on drinking more prep, as container is not even half gone yet since pt started before dinner. Will continue to monitor.

## 2019-07-31 NOTE — PROGRESS NOTES
PT has medium sized formed stool post enema. RN advised PT she would need another. PT only allowed 1/4 bag of the enema to flow in before she needed to go to the commode. RN advised the PT to hold in as long as possible but PT was unable to.

## 2019-08-01 NOTE — CARE COORDINATION
Social work: spoke to [address] [place] [address] They will check to see if precert good for tomorrow. Will need antony and Rx at discharge. Will check hens.   Kyree meléndez

## 2019-08-01 NOTE — FLOWSHEET NOTE
Writer paged by Marilee Beal, that daughters wanted to have advance directives completed. Writer spoke to daughters Cesar Del Rio who were leaving for a bit. Writer told them she would speak to the patient, which writer did. Writer reviewed living will and HPOA. Patient was quite sure about her wishes and indicated she did not want any of \"that stuff\" done at the point it was her time. She has 6 kids and over 21 grandkids; she said her daughters don't get along, and she would have her son Cory Hamm be her HPOA as he has level head and will follow her wishes. Writer explained that the documents could be completed while she's a patient if she prefers and that she can ask for a  to be paged to do so. Patient appreciated the information. 08/01/19 1520   Encounter Summary   Services provided to: Patient; Family   Referral/Consult From: Family  (PCA)   Support System Children   Continue Visiting   (8-1-19)   Complexity of Encounter Moderate   Length of Encounter 30 minutes   Spiritual Assessment Completed Yes   Advance Care Planning Yes   Routine   Type Follow up   Spiritual/Episcopalian   Type Spiritual support   Assessment Calm; Approachable   Intervention Active listening;Explored feelings, thoughts, concerns;Explored coping resources;Nurtured hope;Sustaining presence/ Ministry of presence; Discussed illness/injury and it's impact; Discussed meaning/purpose   Outcome Expressed gratitude;Engaged in conversation;Expressed feelings/needs/concerns;Receptive   Advance Directives (For Healthcare)   Healthcare Directive No, patient does not have an advance directive for healthcare treatment   Advance Directives Documents given;Documents explained

## 2019-08-01 NOTE — OP NOTE
scope was removed and the patient tolerated the procedure well. Recommendations/Plan:   1. F/U Biopsies  2.  Colonoscopy today       Electronically signed by Fina Santillan MD  on 8/1/2019 at 12:21 PM

## 2019-08-01 NOTE — DISCHARGE SUMMARY
(TEMOVATE) 0.05 % cream  Apply topically 2 times daily. clopidogrel (PLAVIX) 75 MG tablet  1 tab once daily             diclofenac sodium (VOLTAREN) 1 % GEL  Apply 2 g topically 2 times daily             hydrALAZINE (APRESOLINE) 100 MG tablet  Take 1 tablet by mouth 2 times daily             pantoprazole (PROTONIX) 40 MG tablet  Take 1 tablet by mouth daily             simvastatin (ZOCOR) 40 MG tablet  Take 40 mg by mouth nightly             sucralfate (CARAFATE) 1 GM tablet  Take 1 tablet by mouth 4 times daily                 Consults:  IP CONSULT TO PHARMACY  IP CONSULT TO GI    Significant Diagnostic Studies:  Ct Abdomen Pelvis Wo Contrast Additional Contrast? None    Result Date: 7/27/2019  EXAMINATION: CT OF THE ABDOMEN AND PELVIS WITHOUT CONTRAST 7/27/2019 3:44 pm TECHNIQUE: CT of the abdomen and pelvis was performed without the administration of intravenous contrast. Multiplanar reformatted images are provided for review. Dose modulation, iterative reconstruction, and/or weight based adjustment of the mA/kV was utilized to reduce the radiation dose to as low as reasonably achievable. COMPARISON: September 8, 2008 CT abdomen pelvis HISTORY: ORDERING SYSTEM PROVIDED HISTORY: ABDOMINAL PAIN TECHNOLOGIST PROVIDED HISTORY: Reason for Exam: Abdominal pain Acuity: Unknown Type of Exam: Unknown Additional signs and symptoms: PT STATES INTERMITTENT UPPER ABDOMINAL PAIN, RIGHT LEG SWELLING Relevant Medical/Surgical History: SURGERIES-APPENDECTOMY, CHOLECYSTECTOMY, HYSTERECTOMY, MASS REMOVED FROM STOMACH, TUBAL LIGATION FINDINGS: Lower Chest: Trace pericardial effusion and calcific coronary artery disease. Organs: The abdominal wall appears normal. The liver, spleen, pancreas, and adrenals appear normal.  Gallbladder surgically absent. Pneumobilia. 30 mm simple right renal cortical cyst.  Punctate nonobstructing nephrolith on the left. The bladder appears normal. GI/Bowel:  The stomach,small bowel, and is slight generalized atrophy. There is mild to moderate patchy periventricular and subcortical white matter low attenuation that is nonspecific but most consistent with chronic small vessel ischemia. Remote right basal ganglia lacunar infarct versus prominent perivascular space. Remote lacunar infarcts in the pablo. There is no evidence of acute hemorrhage, mass or extra-axial fluid collection. ORBITS: The visualized portion of the orbits demonstrate no acute abnormality. SINUSES: The visualized paranasal sinuses and mastoid air cells demonstrate no acute abnormality. Mild mucosal thickening in the right sphenoid locule. SOFT TISSUES/SKULL:  No acute abnormality of the visualized skull or soft tissues. No acute intracranial abnormality. Chronic ischemic changes as above. Xr Chest Portable    Result Date: 7/27/2019  EXAMINATION: ONE XRAY VIEW OF THE CHEST 7/27/2019 1:13 am COMPARISON: June 17, 2017 HISTORY: ORDERING SYSTEM PROVIDED HISTORY: cp TECHNOLOGIST PROVIDED HISTORY: cp Reason for Exam: chest pain, leg pain Acuity: Unknown Type of Exam: Unknown FINDINGS: Shallow inspiration. Pulmonary vasculature is magnified by technique. No airspace consolidation, pleural effusion, or pneumothorax. Cardiomegaly. Calcified plaquing of the aorta. No focal airspace consolidation.      Vl Carotid Bilateral    Result Date: 7/28/2019    [facility]  Vascular Carotid Procedure   Patient Name   FATIMAH  Date of Study           07/27/2019                 Myron Rajan   Date of Birth  1940   Gender                  Female   Age            77 year(s)   Race                    Black   Room Number    2086   Corporate ID # [id number]   Patient Acct # [de-identified]   MR #           [id number]       Sonographer             Kadeem Jim   Accession #    [id number]    Interpreting Physician  Heide Otero   Referring                   Referring Physician     Karle Cooks  Nurse  Practitioner  Procedure Measurements:ICAPSV/CCAPSV 1.13. ICAEDV/CCAEDV 1.5. Disposition:   SNF    Discharge Instructions: Activity: activity as tolerated  Diet:  regular diet    Follow up with Brian Campos MD in 2 weeks.     Signed:  Griselda Latrell  8/1/2019, 6:12 PM    Time spent in discharge of this pt is more than 30 minutes in the examination,evaluvation,counselling and review of medication and   discharge plan    Transitional Care Management information for Post-Hospital follow up  * 06601 - Transitional Care Management service (within 14 working days of discharge)    (lower acuity condition)  * 10494-  Transitional Care Management service (within 7 working days of discharge)    (higher acuity condition)

## 2019-08-01 NOTE — OP NOTE
PROCEDURE NOTE    DATE OF PROCEDURE: 8/1/2019    SURGEON: Geoffrey Browning MD  Facility : [facility]  ASSISTANT: None  Anesthesia: MAc   PREOPERATIVE DIAGNOSIS:   Anemia  Abdominal pain    POSTOPERATIVE DIAGNOSIS: as described below    OPERATION: Total colonoscopy     ANESTHESIA: Moderate Sedation    ESTIMATED BLOOD LOSS: less than 50     COMPLICATIONS: None. SPECIMENS:   1 cm polyp snared    HISTORY: The patient is a 77y.o. year old female with history of above preop diagnosis. I recommended colonoscopy with possible biopsy or polypectomy and I explained the risk, benefits, expected outcome, and alternatives to the procedure. Risks included but are not limited to bleeding, infection, respiratory distress, hypotension, and perforation of the colon and possibility of missing a lesion. The patient understands and is in agreement. PROCEDURE: The patient was given IV conscious sedation. The patient's SPO2 remained above 90% throughout the procedure. The colonoscope was inserted per rectum and advanced under direct vision to the cecum without difficulty. Post sedation note : The patient's SPO2 remained above 90% throughout the procedure. the vital signs remained stable , and no immediate complication form the procedure noted, patient will be ready for d/c when criteria is met . The prep was inadequate.   Large amount of solid and liquid stool making this exam suboptimal I finished it went all the way to the cecum I do not see any colonic mass but pathology can be missed with this kind of prep    Findings:      Cecum/Ascending colon: abnormal: Diverticulosis, 1 cm polyp snared    Transverse colon: abnormal: Diverticulosis    Descending/Sigmoid colon: abnormal: Diverticulosis    Rectum/Anus: examined in normal and retroflexed positions and was abnormal: Hemorrhoids    Large perianal papillary type skin lesions most likely anal warts    Withdrawal Time was (minutes): 10    The colon was decompressed and the scope was removed. The patient tolerated the procedure well. Recommendations/Plan:   1. Surgery/ GYN consult to remove the warts  2.  Okay to discharge from the GI standpoint and follow as an outpatient  3. repeat colonoscopy in 6 months with 2-day prep    Electronically signed by Brandon Gayle MD  on 8/1/2019 at 12:53 PM

## 2019-08-01 NOTE — PLAN OF CARE
Problem: Falls - Risk of:  Goal: Will remain free from falls  Description  Will remain free from falls  8/1/2019 1724 by Nj Crenshaw RN  Outcome: Ongoing  8/1/2019 0409 by Sean Aleman RN  Outcome: Ongoing  Note:   Patient remained free from falls this shift. Call light and bed side table are within reach, bed is in lowest position, and side rails are up x2. Will continue to monitor.    Goal: Absence of physical injury  Description  Absence of physical injury  8/1/2019 1724 by Nj Crenshaw RN  Outcome: Ongoing  8/1/2019 0409 by Sean Aleman RN  Outcome: Ongoing     Problem: Pain:  Goal: Pain level will decrease  Description  Pain level will decrease  8/1/2019 1724 by Nj Crenshaw RN  Outcome: Ongoing  8/1/2019 0409 by Sean Aleman RN  Outcome: Ongoing  Goal: Control of acute pain  Description  Control of acute pain  8/1/2019 1724 by Nj Crenshaw RN  Outcome: Ongoing  8/1/2019 0409 by Sean Aleman RN  Outcome: Ongoing  Goal: Control of chronic pain  Description  Control of chronic pain  8/1/2019 1724 by Nj Crenshaw RN  Outcome: Ongoing  8/1/2019 0409 by Sean Aleman RN  Outcome: Ongoing     Problem: HEMODYNAMIC STATUS  Goal: Patient has stable vital signs and fluid balance  8/1/2019 1724 by Nj Crenshaw RN  Outcome: Ongoing  8/1/2019 0409 by Sean Aleman RN  Outcome: Ongoing     Problem: ACTIVITY INTOLERANCE/IMPAIRED MOBILITY  Goal: Mobility/activity is maintained at optimum level for patient  8/1/2019 1724 by Nj Crenshaw RN  Outcome: Ongoing  8/1/2019 0409 by Sean Aleman RN  Outcome: Ongoing     Problem: Musculor/Skeletal Functional Status  Goal: Highest potential functional level  8/1/2019 1724 by Nj Crenshaw RN  Outcome: Ongoing  8/1/2019 0409 by Sean Aleman RN  Outcome: Ongoing  Goal: Absence of falls  8/1/2019 1724 by Nj Crenshaw RN  Outcome: Ongoing  8/1/2019 0409 by Sean Aleman RN  Outcome: Ongoing     Problem: Musculor/Skeletal Functional Status  Goal: Highest potential

## 2019-08-01 NOTE — PLAN OF CARE
Problem: Falls - Risk of:  Goal: Will remain free from falls  Description  Will remain free from falls  Outcome: Ongoing  Note:   Patient remained free from falls this shift. Call light and bed side table are within reach, bed is in lowest position, and side rails are up x2. Will continue to monitor.    Goal: Absence of physical injury  Description  Absence of physical injury  Outcome: Ongoing     Problem: Pain:  Goal: Pain level will decrease  Description  Pain level will decrease  Outcome: Ongoing  Goal: Control of acute pain  Description  Control of acute pain  Outcome: Ongoing  Goal: Control of chronic pain  Description  Control of chronic pain  Outcome: Ongoing     Problem: HEMODYNAMIC STATUS  Goal: Patient has stable vital signs and fluid balance  Outcome: Ongoing     Problem: ACTIVITY INTOLERANCE/IMPAIRED MOBILITY  Goal: Mobility/activity is maintained at optimum level for patient  Outcome: Ongoing     Problem: Musculor/Skeletal Functional Status  Goal: Highest potential functional level  Outcome: Ongoing  Goal: Absence of falls  Outcome: Ongoing     Problem: Musculor/Skeletal Functional Status  Goal: Highest potential functional level  Outcome: Ongoing

## 2019-08-02 NOTE — PROGRESS NOTES
Writer called transport and was informed they are running behind.  They stated they should be here in about 15 minutes

## 2019-09-06 PROBLEM — I65.23 BILATERAL CAROTID ARTERY STENOSIS: Status: ACTIVE | Noted: 2019-01-01

## 2019-09-23 PROBLEM — R26.9 IMPAIRED GAIT: Status: ACTIVE | Noted: 2019-01-01

## 2019-09-23 NOTE — ED PROVIDER NOTES
[address] ED  eMERGENCY dEPARTMENT eNCOUnter      Pt Name: Mika Dillard  MRN: [id number]  [place] 1940  Date of evaluation: 9/23/19      CHIEF COMPLAINT       Chief Complaint   Patient presents with    Knee Pain     HISTORY OF PRESENT ILLNESS   HPI 77 y.o. female with multiple chronic medical conditions noted below presents with c/o non-traumatic left knee pain. Pain is rated as severe in intensity, started about 4 days ago, progressively worsening. Pain worsened with activity, improved with rest.  She didn't take any pain medications prior to arrival.  No fevers or chills. No redness or swelling. No n/o gout. REVIEW OF SYSTEMS       Review of Systems   HENT: Negative for congestion. Eyes: Negative for visual disturbance. Respiratory: Negative for cough. Cardiovascular: Positive for leg swelling (chronic and unchanged). Negative for chest pain. Gastrointestinal: Negative for abdominal pain, nausea and vomiting. Musculoskeletal: Positive for arthralgias and gait problem. Skin: Negative for pallor. Neurological: Negative for weakness. Hematological: Negative for adenopathy. PAST MEDICAL HISTORY     Past Medical History:   Diagnosis Date    Abnormal kidney function     PT. RELATES UNAWARE OF.  Acid reflux disease     Anesthesia     'needs to be asleep when ET tube removed due  to severe gastric reflux will make me have hard time breathing. \"     Arthritis     Bell's palsy     Cancer ([address]     Breast, lt.  Cerebrovascular disease     pt denies stroke she says she had bells palsy    COPD (chronic obstructive pulmonary disease) ([address]     patient is unaware    Deep vein thrombosis (DVT) ([address]     multiple    Edema     RT. LEG.  History of cancer of left breast     Hx of blood clots     dvt scott. legs.     Hyperlipidemia     Hypertension     Pancreatitis     Wears dentures     UPPER    Wears glasses        SURGICAL HISTORY       Past Surgical

## 2019-09-24 NOTE — CONSULTS
[address]                 [address] [facility], [address]                                  CONSULTATION    PATIENT NAME: Isabel Lange               :        1940  MED REC NO:   [id number]                              ROOM:       2056  ACCOUNT NO:   [de-identified]                           ADMIT DATE: 2019  PROVIDER:     Lázaro Juarez    CONSULT DATE:  2019    HISTORY OF PRESENT ILLNESS:  The patient is a 69-year-old female who was  admitted through the emergency room for inability to ambulate. She is a  poor historian. We spoke at length with her where she complained most  of her left knee, which was what the emergency room had focused on  during her evaluation yesterday. An x-ray shows severe end-stage  osteoarthritis of the left knee. PHYSICAL EXAMINATION:  On exam, the knee is enlarged, not warm or  erythematous. It has some mild effusion, it is stable. The extensor  mechanism was intact. There is no large malalignment. ASSESSMENT AND PLAN:  Getting a satisfactory history from the patient is  difficult. It appears her greatest pain generator is her severely  osteoarthritic left knee. She is presently not a candidate health wise  for a knee arthroplasty. I have recommended an intraarticular  corticosteroid injection which we have ordered from pharmacy and will  complete as soon as possible. We will continue to follow her progress. She will ultimately, if her health allows, require a knee arthroplasty.         DAKOTA SAENZ    D: 2019 13:50:59       T: 2019 18:14:31     [email or web address]  Job#: [id number]     Doc#: [id number]    CC:

## 2019-09-24 NOTE — CONSULTS
New left sided weakness this am.  Probable acute right cerebral infarct. CT brain neg. Pending:  MRI, carotids, and echo. Already on Plavix. Adding 81 ASA. Needs PT/OT and rehab. All dictated. Thank you.

## 2019-09-24 NOTE — CONSULTS
[address] [facility]                 [address] [facility], [address]                                  CONSULTATION    PATIENT NAME: Chris Sahu               :        1940  MED REC NO:   [id number]                              ROOM:       2056  ACCOUNT NO:   [de-identified]                           ADMIT DATE: 2019  PROVIDER:     Prabhjot Has    CONSULT DATE:  2019    REFERRING PROVIDER:  Renetta Ludwig MD    HISTORY OF PRESENT ILLNESS:  Thank you for asking us to see this lady  for a neurologic evaluation. She is 77 years and we are asked to see  her regarding stroke symptoms. The patient does not provide any useful history. She is admitted  because of nontraumatic left knee pain. She has stairs at home, which  she can no longer manage. Dr. Rafi Sadler is on her case. I was  called this morning by the nursing staff because she apparently had  waxing and waning left-sided weakness which included her arm and leg. She apparently was holding the phone and talking to family this morning. She was holding the phone with her left hand. Nursing returned a little  while later and the hand was weaker. Physical Therapy saw her later and  said that she was not moving her left side. She was sent down for a CT  of the brain, which was unremarkable for anything acute. PAST MEDICAL HISTORY:  Significant for hypertension, elevated serum  lipids, pancreatitis, previous deep venous thrombosis, COPD, previous  Bell's palsy, and renal insufficiency. PAST SURGICAL HISTORY:  She has had tubal ligation, gastric surgery,  knee replacement, mastectomy, hysterectomy, cholecystectomy, and  appendectomy. FAMILY HISTORY:  There is a family history of cardiac disease but no  stroke or epilepsy. SOCIAL HISTORY:  She has a longstanding smoking history. She  occasionally drinks alcohol.     CURRENT MEDICATIONS:  Include Plavix in addition to the others that are  noted. REVIEW OF SYSTEMS:  With the computer record and as best as can be done  with the patient as well as nursing staff, other than what is mentioned  above, the review of systems is negative for HEENT, cardiovascular,  pulmonary, gastrointestinal, urinary, musculoskeletal, skin, endocrine,  immunologic, and psychiatric. PHYSICAL EXAMINATION:  On examination, she is awake and alert. She has  some drooping of the left side of her face. Her speech has mild  dysarthria. No aphasia. She knows she is in [facility].  She  is disinterested in answering further questions. She has 2/5 strength  of her left arm and leg. She moves the right side with seemingly full  strength but overall effort is poor throughout. She really does not  cooperate with the sensory exam.  No abnormal movements are seen. No  carotid bruits. Regular rate and rhythm. Equivocal toe response on the  left. IMPRESSION:  Probable right cerebral infarction. A CT of the brain is  negative. An MRI of the brain is pending for today. I am ordering  carotid Dopplers and a 2-D cardiac echo. She is already on Plavix. She  seems like too much of a fall risk for anticoagulation to be considered. 81 mg of aspirin will be added. There is no family here to discuss the  case. She will need extensive physical therapy and probable  rehabilitation.         Soniya Geronimo    D: 09/24/2019 12:20:18       T: 09/24/2019 12:26:54     [email or web address]  Job#: [id number]     Doc#: [id number]    CC:

## 2019-09-24 NOTE — PLAN OF CARE
Problem: Falls - Risk of:  Goal: Will remain free from falls  Description  Will remain free from falls  9/24/2019 1503 by Dean Deleon, RN  Outcome: Ongoing   Pt. Remains free of falls, appropriate fall precautions in place. Problem: Pain:  Goal: Control of acute pain  Description  Control of acute pain  9/24/2019 1503 by Dean Deleon, RN  Outcome: Ongoing   Pt. Verbalizes pain is adequately controlled this shift.

## 2019-09-24 NOTE — DISCHARGE INSTR - COC
10/31/2018    Influenza, MDCK, Preservative free 10/07/2013    Pneumococcal Conjugate 13-valent ([id number]) 10/15/2016    Pneumococcal Conjugate Vaccine 10/28/2015    Pneumococcal Polysaccharide ([id number]) 02/01/2011       Active Problems:  Patient Active Problem List   Diagnosis Code    Chronic pancreatitis ([facility] [address] K86.1    Essential hypertension I10    Hyperlipidemia E78.5    Psoriasis L40.9    Gastroesophageal reflux disease without esophagitis K21.9    Bell's palsy G51.0    Chronic deep vein thrombosis (DVT) of both lower extremities (HCC) I82.503    Tobacco abuse Z72.0    History of alcohol abuse Z87.898    Malignant neoplasm of upper-outer quadrant of left female breast (HCC) C50.412    Obesity (BMI 30.0-34. 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    Delayed surgical wound healing T81.89XA    Cellulitis L03.90    Anemia D64.9    Noncompliance Z91.19    CKD (chronic kidney disease) stage 3, GFR 30-59 ml/min ([facility]) N18.3    Iron deficiency anemia D50.9    TIA (transient ischemic attack) G45.9    Generalized abdominal pain R10.84    Adnexal mass N94.9    Bilateral carotid artery stenosis I65.23    Impaired gait R26.9       Isolation/Infection:   Isolation          No Isolation            Nurse Assessment:  Last Vital Signs: BP (!) 149/68   Pulse 63   Temp 97.8 °F (36.6 °C) (Oral)   Resp 16   Ht 4' 11\" (1.499 m)   Wt 153 lb (69.4 kg)   SpO2 96%   BMI 30.90 kg/m²     Last documented pain score (0-10 scale): Pain Level: 10  Last Weight:   Wt Readings from Last 1 Encounters:   09/23/19 153 lb (69.4 kg)     Mental Status:  oriented    IV Access:  none    Nursing Mobility/ADLs:  Walking   Dependent  Transfer  Dependent  Bathing  Dependent  Dressing  Dependent  Toileting  Dependent  Feeding  Dependent  Med Admin  Dependent  Med Delivery   Crushed in radha pudding    Wound Care Documentation and Therapy:  Wound 10/21/16 Wound #2 left axilla, surgical, abcess post mastectomy, 9/25/16 (Active)   Number of days: 1068       Incision 09/12/16 Breast Left (Active)   Number of days: 1107        Elimination:  Continence:   · Bowel: No  · Bladder: No  Urinary Catheter: None   Colostomy/Ileostomy/Ileal Conduit: No       Date of Last BM: 9/26/2019    Intake/Output Summary (Last 24 hours) at 9/24/2019 1618  Last data filed at 9/24/2019 0925  Gross per 24 hour   Intake 100 ml   Output --   Net 100 ml     I/O last 3 completed shifts: In: 100 [P.O.:100]  Out: -     Safety Concerns:     Aspiration Risk    Impairments/Disabilities:      None    Nutrition Therapy:  Current Nutrition Therapy:   - Oral Diet:  Dysphagia 2 mechanically altered    Routes of Feeding: Oral  Liquids: Thin Liquids  Daily Fluid Restriction: no  Last Modified Barium Swallow with Video (Video Swallowing Test): not done    Treatments at the Time of Hospital Discharge:   Respiratory Treatments:none  Oxygen Therapy:  is not on home oxygen therapy. Ventilator:    - No ventilator support    Rehab Therapies: Physical Therapy and Occupational Therapy  Weight Bearing Status/Restrictions: No weight bearing restirctions  Other Medical Equipment (for information only, NOT a DME order):  wheelchair and walker  Other Treatments: Skilled Nursing assessment and monitoring, Medication Education    Patient's personal belongings (please select all that are sent with patient):   Upper dentures/ear rings/ bracelet    RN SIGNATURE:  Electronically signed by Claire Mckeon on 9/26/19 at 3:41 PM    CASE MANAGEMENT/SOCIAL WORK SECTION    Inpatient Status Date: 9/23/2019    Readmission Risk Assessment Score:  Readmission Risk              Risk of

## 2019-09-24 NOTE — PROGRESS NOTES
Medication History completed:    No changes to medication list at this encounter. Medications confirmed with [facility]. The patient uses clobetasol and diclofenac on her hands.     Thank you,  Kenny Nolen, PharmD, BCPS  [phone]

## 2019-09-25 NOTE — CONSULTS
clopidogrel  75 mg Oral Daily    diclofenac sodium  2 g Topical BID    simvastatin  40 mg Oral Nightly    sucralfate  1 g Oral 4x Daily AC & HS    sodium chloride flush  10 mL Intravenous 2 times per day    enoxaparin  30 mg Subcutaneous Daily    hydrALAZINE  75 mg Oral 2 times per day    triamcinolone acetonide  40 mg Intramuscular Once    bupivacaine (PF)  30 mL Intradermal Once    aspirin  81 mg Oral Daily     Continuous Infusions:  CBC:   Recent Labs     09/24/19  0638   WBC 4.3   HGB 11.7*        BMP:    Recent Labs     09/24/19  0638      K 4.2   *   CO2 23   BUN 19   CREATININE 1.31*   GLUCOSE 90     Hepatic:   Recent Labs     09/24/19  0638   AST 11   ALT 6   BILITOT 0.28*   ALKPHOS 65     Troponin: No results for input(s): TROPONINI in the last 72 hours. BNP: No results for input(s): BNP in the last 72 hours. Lipids: No results for input(s): CHOL, HDL in the last 72 hours. Invalid input(s): LDLCALCU  INR: No results for input(s): INR in the last 72 hours. Objective:   Vitals: BP (!) 181/67   Pulse 62   Temp 98.1 °F (36.7 °C)   Resp 18   Ht 4' 11\" (1.499 m)   Wt 153 lb (69.4 kg)   SpO2 97%   BMI 30.90 kg/m²   General appearance: alert and cooperative with exam  HEENT: Head: Normal, normocephalic, atraumatic. Neck: no adenopathy, no JVD, supple, symmetrical, trachea midline and thyroid not enlarged, symmetric, no tenderness/mass/nodules  Lungs: diminished breath sounds bibasilar  Heart: regular rate and rhythm  Abdomen: soft, non-tender; bowel sounds normal; no masses,  no organomegaly  Extremities: Homans sign is negative, no sign of DVT, diminished strength the left arm and left leg  Neurologic: Mental status: Alert, oriented, thought content appropriate    EKG: Sinus rhythm, borderline Q waves inferior leads. ECHO: ordered, but not yet obtained. Ejection fraction: Not known   Stress Test: not obtained. Cardiac Angiography: not obtained.         Assessment / Acute Cardiac Problems:   Patient admitted with left-sided weakness, speech abnormality  MRI 9/24/2019 multiple old infarcts, multifocal small vessel ischemic changes  Coronary calcification  Hypertension  Hyperlipidemia  Current smoker  Previous history of Bell's palsy  Previous history of DVT  Carotid Doppler July 2019 16-49 % bilateral carotid stenosis, normal vertebral arteries, antegrade flow      Patient Active Problem List:     Chronic pancreatitis ([facility] [address]     Essential hypertension     Hyperlipidemia     Psoriasis     Gastroesophageal reflux disease without esophagitis     Bell's palsy     Chronic deep vein thrombosis (DVT) of both lower extremities (HCC)     Tobacco abuse     History of alcohol abuse     Malignant neoplasm of upper-outer quadrant of left female breast (HCC)     Obesity (BMI 30.0-34. 9)     Anemia associated with acute blood loss     Hematoma (nontraumatic) of breast     Local infection of skin and subcutaneous tissue     Pseudomonas aeruginosa infection     Anorexia     Orthostatic dizziness     Hyperkalemia     Acute kidney injury ([facility] [address]     Alcohol-induced chronic pancreatitis (HCC)     Delayed surgical wound healing     Cellulitis     Anemia     Noncompliance     CKD (chronic kidney disease) stage 3, GFR 30-59 ml/min ([facility])     Iron deficiency anemia     TIA (transient ischemic attack)     Generalized abdominal pain     Adnexal mass     Bilateral carotid artery stenosis     Impaired gait      Plan of Treatment:   Medication checked  Continue with aspirin, Plavix and beta-blocker  I will reviewed 2D echo  Will discuss with the neurologist about LATONIA    Electronically signed by Argenis Tejeda MD on 9/24/2019 at 8:01 PM

## 2019-09-25 NOTE — CARE COORDINATION
ONGOING DISCHARGE PLAN:    Spoke with patient regarding discharge plan and patient confirms that plan is still to discharge to [address]   Patient had a + mri showed infarct acute  Patient will have a echo done today may need a philly will wait to see results   Waiting on pre cert      Will continue to follow for additional discharge needs.     Electronically signed by Nancy Jean RN on 9/25/2019 at 11:56 AM

## 2019-09-25 NOTE — H&P
[facility]    HYSTERECTOMY      partial    MASTECTOMY Left 09/12/2016    ND SONO GUIDE NEEDLE BIOPSY  06/2016    STOMACH SURGERY      \"MASS REMOVED ,( BENIGN)\"    TOTAL KNEE ARTHROPLASTY Right     TUBAL LIGATION      UPPER GASTROINTESTINAL ENDOSCOPY N/A 8/1/2019    EGD ESOPHAGOGASTRODUODENOSCOPY performed by Gayle Hanley MD at [address] [place] [address] ENDO       Medications Prior to Admission:    Prior to Admission medications    Medication Sig Start Date End Date Taking? Authorizing Provider   oxyCODONE-acetaminophen (PERCOCET) 5-325 MG per tablet Take 1 tablet by mouth every 6 hours as needed for Pain for up to 5 days. 9/23/19 9/28/19 Yes Tim Chavez MD   pantoprazole (PROTONIX) 40 MG tablet Take 1 tablet by mouth daily 8/12/19  Yes Crista Gil APRN - CNP   simvastatin (ZOCOR) 40 MG tablet Take 1 tablet by mouth nightly 8/12/19  Yes Crista Gil APRN - CNP   hydrALAZINE (APRESOLINE) 100 MG tablet Take 1 tablet by mouth 2 times daily 8/2/19  Yes Dianne Garrett MD   sucralfate (CARAFATE) 1 GM tablet Take 1 tablet by mouth 4 times daily 8/1/19  Yes Dianne Garrett MD   clobetasol (TEMOVATE) 0.05 % cream Apply topically 2 times daily.  7/24/19  Yes Crista Gil APRN - CNP   clopidogrel (PLAVIX) 75 MG tablet 1 tab once daily 7/24/19  Yes Crista Gil APRN - CNP   carvedilol (COREG) 25 MG tablet Take 1 tablet by mouth 2 times daily (with meals) 7/24/19  Yes Crista Gil APRN - CNP   diclofenac sodium (VOLTAREN) 1 % GEL Apply 2 g topically 2 times daily 10/31/18  Yes Crista Gil APRN - CNP   acetaminophen (APAP EXTRA STRENGTH) 500 MG tablet Take 1 tablet by mouth every 6 hours as needed for Pain 7/27/18  Yes Crista Gil APRN - CNP       Current meds  Scheduled Meds:   carvedilol  25 mg Oral BID WC    clobetasol   Topical BID    clopidogrel  75 mg Oral Daily    diclofenac sodium  2 g Topical BID    simvastatin  40 mg Oral Nightly    sucralfate  1 g Oral 4x Daily AC & HS    sodium chloride flush  10 mL Intravenous 2 times per 09/24/19  0638      K 4.2   *   CO2 23   BUN 19   CREATININE 1.31*   GLUCOSE 90     Hepatic:   Recent Labs     09/24/19  0638   AST 11   ALT 6   BILITOT 0.28*   ALKPHOS 65     Troponin: No results for input(s): TROPONINI in the last 72 hours. BNP: No results for input(s): BNP in the last 72 hours. Lipids: No results for input(s): CHOL, HDL in the last 72 hours. Invalid input(s): LDLCALCU  ABGs: No results found for: PHART, PO2ART, [id number]  INR: No results for input(s): INR in the last 72 hours. -----------------------------------------------------------------  PA/lat CXR: Xr Knee Left (3 Views)    Result Date: 9/23/2019  EXAMINATION: THREE XRAY VIEWS OF THE LEFT KNEE 9/23/2019 4:50 pm COMPARISON: Two-view study of the left knee from 07/27/2019 HISTORY: ORDERING SYSTEM PROVIDED HISTORY: Pain TECHNOLOGIST PROVIDED HISTORY: Pain Reason for Exam:  left knee pain Acuity: Unknown Type of Exam: Unknown History of arthritis, breast cancer, and chronic alcohol-induced pancreatitis. FINDINGS: Mild osteopenia. Similar tricompartmental degenerative findings again demonstrated; narrowing medial knee joint with marginal spurring and sclerosis, marginal lateral knee joint spurring, and patellar spurring. No fracture. No dislocation. No destructive or blastic lesion. No sizable knee joint effusion. No acute abnormality radiographically. Similar tricompartmental degenerative and other findings, as above. Ct Head Wo Contrast    Result Date: 9/24/2019  EXAMINATION: CT OF THE HEAD WITHOUT CONTRAST  9/24/2019 6:59 am TECHNIQUE: CT of the head was performed without the administration of intravenous contrast. Dose modulation, iterative reconstruction, and/or weight based adjustment of the mA/kV was utilized to reduce the radiation dose to as low as reasonably achievable.  COMPARISON: July 27, 2019 HISTORY: ORDERING SYSTEM PROVIDED HISTORY: DECREASED ALERTNESS TECHNOLOGIST PROVIDED HISTORY: Reason for Exam:

## 2019-09-25 NOTE — PROGRESS NOTES
Dx'ed with a CVA . Agreed to a L knee corticosteroid injection. L knee injected sterilely with 40 mg kenalog and 10 ml of Sensorcaine.  w/o complication for severe DJD

## 2019-09-25 NOTE — PROGRESS NOTES
0-10  Pain Level: 5  Patient's Stated Pain Goal: No pain  Pain Type: Acute pain  Pain Location: Knee  Pain Orientation: Left  Non-Pharmaceutical Pain Intervention(s): Distraction;Repositioned; Ambulation/Increased Activity  Response to Pain Intervention: Patient Satisfied                Bed Mobility:   Bed Mobility  Rolling: Maximal assistance  Supine to Sit: Maximal assistance(x1)  Sit to Supine: Maximal assistance(x2)  Scooting: Maximal assistance;2 Person assistance(to HOB, Max Ax1 to EOB)  Bed mobility  Scooting: Maximal assistance;2 Person assistance(to HOB, Max Ax1 to EOB)    Transfers:  Sit to Stand: Unable to assess(not safe at this time)  Stand to sit: Unable to assess(Not safe at this time)                                                                                Posture: Poor  Sitting - Static: Poor  Sitting - Dynamic: Poor  Standing - Static: (Not ready)  Standing - Dynamic: (not ready)  Comments: seated EOB     Other exercises?: Yes  Other exercises 1: Seated bilat LE therex  LAQ PROM left AAROM right  Other exercises 2: Seated Dynamic Activity working on core strength  Other exercises 3: Dangle EOB 20-30 min. Other exercises 4: Bed Mobility x2           Activity Tolerance: Patient limited by fatigue;Patient limited by endurance; Patient limited by pain  Activity Tolerance: Pt very delayed word finding and processing          Assessment  Activity Tolerance: Patient limited by fatigue;Patient limited by endurance; Patient limited by pain   Body structures, Functions, Activity limitations: Decreased functional mobility ; Decreased ROM; Decreased strength;Decreased safe awareness;Decreased cognition;Decreased endurance;Decreased balance  Prognosis: Fair  Discharge Recommendations: Subacute/Skilled Nursing Facility     Type of devices: Call light within reach; Bed alarm in place; Left in bed;Nurse notified     Plan  Times per week: 5 to 6 x/wk  Current Treatment Recommendations: ROM, Transfer Training,

## 2019-09-26 NOTE — CARE COORDINATION
Social Work-Made tentative plans for pt to transfer at 4 PM by [facility] to [facility]. Nurse to call report to [phone] and fax ROSI to [phone]. Please notify family. CEDRICK completed. Discussed with pt. She is agreeable with transfer to [address] [facility]. Please call Chantel at [phone], if dc is cancelled.  Luis Chanel

## 2019-09-26 NOTE — PROGRESS NOTES
Hospitalist Progress Note  9/25/2019 9:01 PM  Subjective:   Admit Date: 9/23/2019  PCP: Deloris Mcdaniel MD     Full Code      C/c:  Chief Complaint   Patient presents with    Knee Pain         Interval History: feeling slightly better,await echo report    Diet: DIET GENERAL;                                ip days:2  Medications:   Scheduled Meds:   [START ON 9/26/2019] influenza virus vaccine  0.5 mL Intramuscular Once    carvedilol  25 mg Oral BID WC    clobetasol   Topical BID    clopidogrel  75 mg Oral Daily    diclofenac sodium  2 g Topical BID    simvastatin  40 mg Oral Nightly    sucralfate  1 g Oral 4x Daily AC & HS    sodium chloride flush  10 mL Intravenous 2 times per day    enoxaparin  30 mg Subcutaneous Daily    hydrALAZINE  75 mg Oral 2 times per day    aspirin  81 mg Oral Daily     Continuous Infusions:  PRN Meds:.morphine, sodium chloride flush, potassium chloride **OR** potassium alternative oral replacement **OR** potassium chloride, magnesium hydroxide, ondansetron, acetaminophen, HYDROcodone 5 mg - acetaminophen, perflutren lipid microspheres     CBC:   Recent Labs     09/24/19  0638 09/25/19  0528   WBC 4.3 4.7   HGB 11.7* 11.8*    201     BMP:    Recent Labs     09/24/19 0638 09/25/19  0528    145*   K 4.2 3.8   * 113*   CO2 23 21   BUN 19 18   CREATININE 1.31* 1.02*   GLUCOSE 90 82     Hepatic:   Recent Labs     09/24/19  0638 09/25/19  0528   AST 11 11   ALT 6 6   BILITOT 0.28* 0.26*   ALKPHOS 65 64     Troponin: No results for input(s): TROPONINI in the last 72 hours. BNP: No results for input(s): BNP in the last 72 hours. Lipids:   Recent Labs     09/25/19  0528   CHOL 188   HDL 34*     INR: No results for input(s): INR in the last 72 hours.     Objective:   Vitals: BP (!) 162/72   Pulse 67   Temp 97.3 °F (36.3 °C) (Oral)   Resp 16   Ht 4' 11\" (1.499 m)   Wt 153 lb (69.4 kg)   SpO2 100%   BMI 30.90 kg/m²   General appearance: alert, appears stated age and cooperative  Skin: Skin color, texture, turgor normal. No rashes or lesions  Lungs: clear to auscultation bilaterally  Heart: regular rate and rhythm, S1, S2 normal, no murmur, click, rub or gallop  Abdomen: soft, non-tender; bowel sounds normal; no masses,  no organomegaly  Extremities: extremities normal, atraumatic, no cyanosis or edema  Neurologic: Mental status: Alert, oriented, thought content appropriate    Prophylaxis:   DVT with  [x] lovenox        [] heparin        [] Scd        [] none:     Radiology:  Xr Knee Left (3 Views)    Result Date: 9/23/2019  EXAMINATION: THREE XRAY VIEWS OF THE LEFT KNEE 9/23/2019 4:50 pm COMPARISON: Two-view study of the left knee from 07/27/2019 HISTORY: ORDERING SYSTEM PROVIDED HISTORY: Pain TECHNOLOGIST PROVIDED HISTORY: Pain Reason for Exam:  left knee pain Acuity: Unknown Type of Exam: Unknown History of arthritis, breast cancer, and chronic alcohol-induced pancreatitis. FINDINGS: Mild osteopenia. Similar tricompartmental degenerative findings again demonstrated; narrowing medial knee joint with marginal spurring and sclerosis, marginal lateral knee joint spurring, and patellar spurring. No fracture. No dislocation. No destructive or blastic lesion. No sizable knee joint effusion. No acute abnormality radiographically. Similar tricompartmental degenerative and other findings, as above. Ct Head Wo Contrast    Result Date: 9/24/2019  EXAMINATION: CT OF THE HEAD WITHOUT CONTRAST  9/24/2019 6:59 am TECHNIQUE: CT of the head was performed without the administration of intravenous contrast. Dose modulation, iterative reconstruction, and/or weight based adjustment of the mA/kV was utilized to reduce the radiation dose to as low as reasonably achievable.  COMPARISON: July 27, 2019 HISTORY: ORDERING SYSTEM PROVIDED HISTORY: DECREASED ALERTNESS TECHNOLOGIST PROVIDED HISTORY: Reason for Exam: Decreased alertness Acuity: Unknown Type of Exam: Unknown Additional +-----------+--------+-------+-------+------+-----------+------------------+ ! Bulb       !141.5   !6.74   !       !0.95  !           !                  ! +-----------+--------+-------+-------+------+-----------+------------------+ ! Prox ICA   ! 45.96   !6.38   !       !0.86  !           !                  ! +-----------+--------+-------+-------+------+-----------+------------------+ ! Mid ICA    !44.35   !13.65  !       !0.69  !           !                  ! +-----------+--------+-------+-------+------+-----------+------------------+ ! Prox ECA   !119.39  !3.07   !       !0.97  !           !                  ! +-----------+--------+-------+-------+------+-----------+------------------+ ! Vertebral  !41.18   !11.52  !       !0.72  !           !                  ! +-----------+--------+-------+-------+------+-----------+------------------+   - There is antegrade vertebral flow noted on the right side. - Additional Measurements:ICAPSV/CCAPSV 0.61. ICAEDV/CCAEDV 1.14. Carotid Left Measurements +------------+--------+--------+--------+-------+------------+-------------+ ! Location    ! PSV     ! EDV     ! Angle   ! RI     !%Stenosis   ! Tortuosity   ! +------------+--------+--------+--------+-------+------------+-------------+ ! Prox CCA    !74.64   !11.45   !        !0.85   !            !             ! +------------+--------+--------+--------+-------+------------+-------------+ ! Mid CCA     !88.91   !13.74   !        !0.85   !            !             ! +------------+--------+--------+--------+-------+------------+-------------+ ! Dist CCA    !69.47   !15.03   !        !0.78   !            !             ! +------------+--------+--------+--------+-------+------------+-------------+ ! Bulb        !33.22   !4.62    !        !0.86   !            !             ! +------------+--------+--------+--------+-------+------------+-------------+ ! Prox ICA    ! 53.79   !18.59   !        !0.65   !            !             !

## 2019-09-26 NOTE — CARE COORDINATION
Social work: spoke to [employer] [facility] they did receive antony and Rx. Dany done. RN called report.   Sy meléndez

## 2019-09-26 NOTE — PROGRESS NOTES
Speech Language Pathology  Facility/Department: [address] [place] [address]   CLINICAL BEDSIDE SWALLOW EVALUATION    NAME: Hannah Dillard  : 1940  MRN: [id number]    ADMISSION DATE: 2019  ADMITTING DIAGNOSIS: has Chronic pancreatitis ([facility] [address] Essential hypertension; Hyperlipidemia; Psoriasis; Gastroesophageal reflux disease without esophagitis; Bell's palsy; Chronic deep vein thrombosis (DVT) of both lower extremities ([facility] [address] Tobacco abuse; History of alcohol abuse; Malignant neoplasm of upper-outer quadrant of left female breast ([address] Obesity (BMI 30.0-34.9); Anemia associated with acute blood loss; Hematoma (nontraumatic) of breast; Local infection of skin and subcutaneous tissue; Pseudomonas aeruginosa infection; Anorexia; Orthostatic dizziness; Hyperkalemia; Acute kidney injury ([facility] [address] Alcohol-induced chronic pancreatitis ([facility] [address] Delayed surgical wound healing; Cellulitis; Anemia; Noncompliance; CKD (chronic kidney disease) stage 3, GFR 30-59 ml/min ([facility] [address] Iron deficiency anemia; TIA (transient ischemic attack); Generalized abdominal pain;  Adnexal mass; Bilateral carotid artery stenosis; and Impaired gait on their problem list.  ONSET DATE: Per RN, dtr reports decline in swallow function over the \"past few months\"    Date of Eval: 2019  Evaluating Therapist: Asa Baron    Current Diet level:  Current Diet : Regular  Current Liquid Diet : Thin      Primary Complaint  Patient Complaint: none    Pain:  Pain Assessment  Pain Assessment: 0-10  Pain Level: 1  Sue-Baker Pain Rating: Hurts even more(With Left Knee ROM)  Patient's Stated Pain Goal: No pain  Pain Type: Acute pain  Pain Location: Knee  Pain Orientation: Left  Pain Descriptors: Patient unable to describe  Pain Frequency: Other (Comment)(Pt does not verbalize much, at times says one word answer.)  Pain Onset: On-going  Clinical Progression: Gradually improving  Functional Pain Assessment: Prevents or interferes with all active and some

## 2019-09-26 NOTE — PROGRESS NOTES
Still moving very poorly. No appreciable improvement with L knee injection. May take some time for injection to work. .. Presently not a candidate for Total Knee Replacement.

## 2019-09-26 NOTE — DISCHARGE SUMMARY
/ [facility] Discharge Summary    Ival Nissen [place]  :  1940  MRN:  [id number]    Admit date:  2019  Discharge date:    2019  Admitting Physician:  Alecia Hernandez MD    Discharge Diagnoses:   Patient Active Problem List   Diagnosis    Chronic pancreatitis [facility])    Essential hypertension    Hyperlipidemia    Psoriasis    Gastroesophageal reflux disease without esophagitis    Bell's palsy    Chronic deep vein thrombosis (DVT) of both lower extremities ([facility] [address]    Tobacco abuse    History of alcohol abuse    Malignant neoplasm of upper-outer quadrant of left female breast ([facility] [address]    Obesity (BMI 30.0-34. 9)    Anemia associated with acute blood loss    Hematoma (nontraumatic) of breast    Local infection of skin and subcutaneous tissue    Pseudomonas aeruginosa infection    Anorexia    Orthostatic dizziness    Hyperkalemia    Acute kidney injury (HCC)    Alcohol-induced chronic pancreatitis (HCC)    Delayed surgical wound healing    Cellulitis    Anemia    Noncompliance    CKD (chronic kidney disease) stage 3, GFR 30-59 ml/min (HCC)    Iron deficiency anemia    TIA (transient ischemic attack)    Generalized abdominal pain    Adnexal mass    Bilateral carotid artery stenosis    Impaired gait        Admission Condition:  fair      Discharged Condition:  fair    Hospital Course/Treatments   Admitted with h/o of  Ac knee pain and later in floor pt had difficulty in lifting her upper extremities, pt was seen by neuro and  Ct was done which was negative, pt later underwent mri which showed infarct, pt echo came back with no clots, pt was a poor candidate for tpa, pt had echo and carotid which was reported normal, pt will be d/c to rehab with following meds    Discharge Medications:       [place], [place] Medication Instructions GREYSON:[id number]    Printed on:19 1526   Medication Information                      acetaminophen (APAP EXTRA STRENGTH) 500 MG +-----------+--------+-------+-------+------+-----------+------------------+ ! Location   ! PSV     ! EDV    ! Angle  ! RI    !%Stenosis  ! Tortuosity        ! +-----------+--------+-------+-------+------+-----------+------------------+ ! Prox CCA   !75.79   !11.99  !       !0.84  !           !                  ! +-----------+--------+-------+-------+------+-----------+------------------+ ! Mid CCA    !63.36   !11.66  !       !0.82  !           !                  ! +-----------+--------+-------+-------+------+-----------+------------------+ ! Dist CCA   !56.76   !11.66  !       !0.79  !           !                  ! +-----------+--------+-------+-------+------+-----------+------------------+ ! Bulb       !141.5   !6.74   !       !0.95  !           !                  ! +-----------+--------+-------+-------+------+-----------+------------------+ ! Prox ICA   ! 45.96   !6.38   !       !0.86  !           !                  ! +-----------+--------+-------+-------+------+-----------+------------------+ ! Mid ICA    !44.35   !13.65  !       !0.69  !           !                  ! +-----------+--------+-------+-------+------+-----------+------------------+ ! Prox ECA   !119.39  !3.07   !       !0.97  !           !                  ! +-----------+--------+-------+-------+------+-----------+------------------+ ! Vertebral  !41.18   !11.52  !       !0.72  !           !                  ! +-----------+--------+-------+-------+------+-----------+------------------+   - There is antegrade vertebral flow noted on the right side. - Additional Measurements:ICAPSV/CCAPSV 0.61. ICAEDV/CCAEDV 1.14. Carotid Left Measurements +------------+--------+--------+--------+-------+------------+-------------+ ! Location    ! PSV     ! EDV     ! Angle   ! RI     !%Stenosis   ! Tortuosity   ! +------------+--------+--------+--------+-------+------------+-------------+ ! Prox CCA    !74.64   !11.45   !        !0.85   !            !             !

## 2019-10-25 PROBLEM — I82.4Z9 ACUTE DEEP VEIN THROMBOSIS (DVT) OF DISTAL VEIN OF LOWER EXTREMITY (HCC): Status: ACTIVE | Noted: 2019-01-01

## 2019-10-25 PROBLEM — K92.2 GI BLEED: Status: ACTIVE | Noted: 2019-01-01

## 2019-11-09 PROBLEM — E44.1 MILD MALNUTRITION (HCC): Status: ACTIVE | Noted: 2019-01-01

## 2019-11-18 PROBLEM — R07.9 CHEST PAIN: Status: ACTIVE | Noted: 2019-01-01

## 2019-12-31 PROBLEM — R77.8 ELEVATED TROPONIN: Status: ACTIVE | Noted: 2019-01-01

## 2019-12-31 NOTE — CARE COORDINATION
CASE MANAGEMENT NOTE:    Admission Date:  12/31/2019 Cassie Reaves is a [de-identified] y.o.  female    Admitted for : No admission diagnoses are documented for this encounter. Met with:  Family    PCP:  Shweta [facility]:  [employer]      Current Residence/ Living Arrangements:  at home dependent on nursing care             Current Services PTA:  Yes    Is patient agreeable to VNS: Yes    Freedom of choice provided: Yes    List of [address] provided: No    VNS chosen:  Yes  [facility]     DME:  bedside commode, straight cane, walker, wheelchair, shower chair and other till in need of Janki lift    Home Oxygen: No    Nebulizer: No    CPAP/BIPAP: No    Supplier: [employer] [facility] Chbil Needed: Yes    SNF needed: No    Pharmacy:  [facility] [address] and mail in        Is the Patient an [employer] with Readmission Risk Score greater than 14%? No  If yes, pt needs a follow up appointment made within 7 days. Does Patient want to use MEDS to BEDS? Yes    Family Members/Caregivers that pt would like involved in their care:    Yes    If yes, list name here:  Ruslan Salamanca, daughter in law [phone]    Transportation Provider:  Rozina Rodriguez             Is patient in Isolation/One on One/Altered Mental Status? Yes  If yes, skip next question. If no, would they like an I-Pad to  use? No  If yes, call [phone]. Discharge Plan:  12/31/2019 Andrew Rojas lives in a 1 story home with family and VNS still needs lift  Wayne Burns should have it ordered need to be delivered Has [facility] VNS and wants to continue this.  Troponin elevated and B/P elevated  //mk                 Electronically signed by: Jas Vidales RN on 12/31/2019 at 3:45 PM

## 2019-12-31 NOTE — ED NOTES
Writer spoke with patients' daughter Hermann Moran who states that patient does not wear dentures anymore and patient is on a puree diet.      Flores Enamorado RN  12/31/19 0039

## 2019-12-31 NOTE — ED NOTES
Patient repositioned in bed. Pillow placed on right side, patient provided with warm blanket. Call light within reach.       Dayanara Bailey, RN  12/31/19 2037

## 2019-12-31 NOTE — ED NOTES
Bed: 12  Expected date:   Expected time:   Means of arrival:   Comments:     Pa Allan RN  12/31/19 0214

## 2019-12-31 NOTE — ED NOTES
Patient incontinent of urine and stool. Patients brief changed. Patient noted to have what appears to be anal warts. Dr. Bessie Gomes at bedside to eval. Patient repositioned in bed. Call light within reach.           Argenis Linder RN  12/31/19 1910

## 2019-12-31 NOTE — ED TRIAGE NOTES
Mode of arrival (squad #, walk in, police, etc) : EMS        Chief complaint(s): High blood pressure. Arrival Note (brief scenario, treatment PTA, etc). : Pt arrives to ED via EMS who states that family called EMS after the patients' home health nurse checked patients' BP and it was elevated. Upon arrival to the ED patient is alert and oriented x4. Patient does have a history of dementia. Patient states that she was brought to the ED for her high blood pressure. Patient states that she has not been taking her medications. Patient does not give a reason for not taking her medications, patient shrugs her shoulders when asked. Patient states that she does have a headache that she rates 10/10. Patient denies any vision changes. Patient denies numbness or tingling. Patient is placed on cardiac monitor and continuous pulse oximetry. Patient is resting on stretcher no s/s of distress. C= \"Have you ever felt that you should Cut down on your drinking? \"  No  A= \"Have people Annoyed you by criticizing your drinking? \"  No  G= \"Have you ever felt bad or Guilty about your drinking? \"  No  E= \"Have you ever had a drink as an Eye-opener first thing in the morning to steady your nerves or to help a hangover? \"  No      Deferred []      Reason for deferring: N/A    *If yes to two or more: probable alcohol abuse. *

## 2019-12-31 NOTE — ED PROVIDER NOTES
[address] ED  eMERGENCY dEPARTMENT eNCOUnter      Pt Name: Spencer Dillard  MRN: [id number]  [place] 1940  Date of evaluation: 12/31/19      CHIEF COMPLAINT       Chief Complaint   Patient presents with    Hypertension         HISTORY OF PRESENT ILLNESS    Calin Loja is a [de-identified] y.o. female who presents complaining of high blood pressure. Patient is sent in by her home health aide after they noticed that her blood pressure was extremely high. It was reported over 200 at home. Patient is mostly nonverbal but answers no to having any pain or being sick. Patient is reportedly noncompliant with her medications but cannot tell me why. Patient does live with family. According to EMS she is at her baseline. REVIEW OF SYSTEMS       Review of Systems   Constitutional: Negative for activity change, appetite change, chills, diaphoresis and fever. HENT: Negative for congestion, ear pain, facial swelling, nosebleeds, rhinorrhea, sinus pressure, sore throat and trouble swallowing. Eyes: Negative for pain, discharge and redness. Respiratory: Negative for cough, chest tightness, shortness of breath and wheezing. Cardiovascular: Negative for chest pain, palpitations and leg swelling. Gastrointestinal: Negative for abdominal pain, blood in stool, constipation, diarrhea, nausea and vomiting. Genitourinary: Negative for difficulty urinating, dysuria, flank pain, frequency, genital sores and hematuria. Musculoskeletal: Negative for arthralgias, back pain, gait problem, joint swelling, myalgias and neck pain. Skin: Negative for color change, pallor, rash and wound. Neurological: Negative for dizziness, tremors, seizures, syncope, speech difficulty, weakness, numbness and headaches. Psychiatric/Behavioral: Negative for confusion, decreased concentration, hallucinations, self-injury, sleep disturbance and suicidal ideas.        PAST MEDICAL HISTORY     Past Medical History: Temp:       TempSrc:       SpO2: 99%      Weight:       Height:           The patient was given the following medications while in the emergency department:  Orders Placed This Encounter   Medications    carvedilol (COREG) tablet 25 mg    hydrALAZINE (APRESOLINE) tablet 25 mg    acetaminophen (TYLENOL) tablet 500 mg       -------------------------  3:52 PM  Patient was reevaluated and continues to do well and blood pressure is better and family states they were able to convince her to take her home meds. Patient's elevated troponin was noted and her second 1 went up even a little bit farther. I think in the end this is most likely just related to how high her blood pressure was earlier but we will get her admitted for observation. I spoke with Dr. Elfego Sorensen who agrees to the admission and will follow her troponin and see her again tomorrow. CONSULTS:  IP CONSULT TO PRIMARY CARE PROVIDER    PROCEDURES:  None    FINAL IMPRESSION      1. Elevated troponin    2.  Hypertension, unspecified type          DISPOSITION/PLAN   DISPOSITION Admitted 12/31/2019 03:52:03 PM      PATIENT REFERREDTO:  Praful Riddle MD  [address]  [address] [facility]            [address] [place] [address] [place]:  New Prescriptions    No medications on file       (Please note that portions of this note were completed with a voice recognition program.  Efforts were made to edit thedictations but occasionally words are mis-transcribed.)    Min Doe MD  Attending Emergency Physician                        Min Doe MD  12/31/19 2865

## 2019-12-31 NOTE — PROGRESS NOTES
Medication History completed:    New medications: acetaminophen    Medications discontinued: none    Changes to dosing: none    Stated allergies: As listed    Other pertinent information: Medications confirmed with AVS from 12/6/19.      Thank you,  Erika Falcon, PharmD, BCPS  [phone]

## 2019-12-31 NOTE — ED NOTES
Page placed to Dr. Joseph Banuelos. Dr. Jesus Dunne covering. Awaiting return call.       Mihir Gill RN  12/31/19 3844

## 2020-01-01 ENCOUNTER — APPOINTMENT (OUTPATIENT)
Dept: INTERVENTIONAL RADIOLOGY/VASCULAR | Age: 80
DRG: 682 | End: 2020-01-01
Payer: MEDICARE

## 2020-01-01 ENCOUNTER — APPOINTMENT (OUTPATIENT)
Dept: ULTRASOUND IMAGING | Age: 80
DRG: 682 | End: 2020-01-01
Payer: MEDICARE

## 2020-01-01 VITALS
TEMPERATURE: 97.3 F | WEIGHT: 154.32 LBS | OXYGEN SATURATION: 95 % | HEART RATE: 73 BPM | BODY MASS INDEX: 28.4 KG/M2 | DIASTOLIC BLOOD PRESSURE: 83 MMHG | SYSTOLIC BLOOD PRESSURE: 188 MMHG | RESPIRATION RATE: 18 BRPM | HEIGHT: 62 IN

## 2020-01-01 PROBLEM — E87.6 HYPOKALEMIA: Status: ACTIVE | Noted: 2020-01-01

## 2020-01-01 PROBLEM — I16.0 HYPERTENSIVE URGENCY: Status: ACTIVE | Noted: 2020-01-01

## 2020-01-01 LAB
-: ABNORMAL
-: NORMAL
ABSOLUTE BANDS #: 0.42 K/UL (ref 0–1)
ABSOLUTE EOS #: 0 K/UL (ref 0–0.4)
ABSOLUTE EOS #: 0.12 K/UL (ref 0–0.4)
ABSOLUTE EOS #: 0.2 K/UL (ref 0–0.4)
ABSOLUTE EOS #: 0.21 K/UL (ref 0–0.4)
ABSOLUTE EOS #: 0.28 K/UL (ref 0–0.4)
ABSOLUTE IMMATURE GRANULOCYTE: ABNORMAL K/UL (ref 0–0.3)
ABSOLUTE LYMPH #: 0.28 K/UL (ref 1–4.8)
ABSOLUTE LYMPH #: 0.54 K/UL (ref 1–4.8)
ABSOLUTE LYMPH #: 0.7 K/UL (ref 1–4.8)
ABSOLUTE LYMPH #: 0.71 K/UL (ref 1–4.8)
ABSOLUTE LYMPH #: 0.81 K/UL (ref 1–4.8)
ABSOLUTE LYMPH #: 0.91 K/UL (ref 1–4.8)
ABSOLUTE LYMPH #: 1.3 K/UL (ref 1–4.8)
ABSOLUTE MONO #: 0.21 K/UL (ref 0.1–1.3)
ABSOLUTE MONO #: 0.28 K/UL (ref 0.1–1.3)
ABSOLUTE MONO #: 0.42 K/UL (ref 0.1–1.3)
ABSOLUTE MONO #: 0.5 K/UL (ref 0.1–1.3)
ABSOLUTE MONO #: 0.56 K/UL (ref 0.1–1.3)
ABSOLUTE MONO #: 0.81 K/UL (ref 0.1–1.3)
ABSOLUTE MONO #: 1.12 K/UL (ref 0.1–1.3)
ALBUMIN (CALCULATED): 2.6 G/DL (ref 3.2–5.2)
ALBUMIN PERCENT: 49 % (ref 45–65)
ALBUMIN SERPL-MCNC: 2.1 G/DL (ref 3.5–5.2)
ALBUMIN SERPL-MCNC: 2.1 G/DL (ref 3.5–5.2)
ALBUMIN SERPL-MCNC: 2.2 G/DL (ref 3.5–5.2)
ALBUMIN SERPL-MCNC: 2.3 G/DL (ref 3.5–5.2)
ALBUMIN SERPL-MCNC: 2.4 G/DL (ref 3.5–5.2)
ALBUMIN SERPL-MCNC: 2.7 G/DL (ref 3.5–5.2)
ALBUMIN SERPL-MCNC: 2.8 G/DL (ref 3.5–5.2)
ALBUMIN SERPL-MCNC: 3.2 G/DL (ref 3.5–5.2)
ALBUMIN/GLOBULIN RATIO: ABNORMAL (ref 1–2.5)
ALDOSTERONE COMMENT: NORMAL
ALDOSTERONE: 5.1 NG/DL
ALP BLD-CCNC: 39 U/L (ref 35–104)
ALP BLD-CCNC: 43 U/L (ref 35–104)
ALP BLD-CCNC: 43 U/L (ref 35–104)
ALP BLD-CCNC: 45 U/L (ref 35–104)
ALP BLD-CCNC: 46 U/L (ref 35–104)
ALPHA 1 PERCENT: 4 % (ref 3–6)
ALPHA 2 PERCENT: 14 % (ref 6–13)
ALPHA-1-GLOBULIN: 0.2 G/DL (ref 0.1–0.4)
ALPHA-2-GLOBULIN: 0.7 G/DL (ref 0.5–0.9)
ALT SERPL-CCNC: 6 U/L (ref 5–33)
ALT SERPL-CCNC: 6 U/L (ref 5–33)
ALT SERPL-CCNC: 7 U/L (ref 5–33)
ALT SERPL-CCNC: 8 U/L (ref 5–33)
ALT SERPL-CCNC: <5 U/L (ref 5–33)
AMORPHOUS: ABNORMAL
ANION GAP SERPL CALCULATED.3IONS-SCNC: 10 MMOL/L (ref 9–17)
ANION GAP SERPL CALCULATED.3IONS-SCNC: 10 MMOL/L (ref 9–17)
ANION GAP SERPL CALCULATED.3IONS-SCNC: 11 MMOL/L (ref 9–17)
ANION GAP SERPL CALCULATED.3IONS-SCNC: 11 MMOL/L (ref 9–17)
ANION GAP SERPL CALCULATED.3IONS-SCNC: 12 MMOL/L (ref 9–17)
ANION GAP SERPL CALCULATED.3IONS-SCNC: 13 MMOL/L (ref 9–17)
ANION GAP SERPL CALCULATED.3IONS-SCNC: 14 MMOL/L (ref 9–17)
ANION GAP SERPL CALCULATED.3IONS-SCNC: 14 MMOL/L (ref 9–17)
ANION GAP SERPL CALCULATED.3IONS-SCNC: 15 MMOL/L (ref 9–17)
AST SERPL-CCNC: 13 U/L
AST SERPL-CCNC: 14 U/L
AST SERPL-CCNC: 15 U/L
AST SERPL-CCNC: 18 U/L
AST SERPL-CCNC: 19 U/L
BACTERIA: ABNORMAL
BANDS: 3 % (ref 0–10)
BASOPHILS # BLD: 0 % (ref 0–2)
BASOPHILS # BLD: 1 % (ref 0–2)
BASOPHILS # BLD: 1 % (ref 0–2)
BASOPHILS ABSOLUTE: 0 K/UL (ref 0–0.2)
BASOPHILS ABSOLUTE: 0.07 K/UL (ref 0–0.2)
BASOPHILS ABSOLUTE: 0.1 K/UL (ref 0–0.2)
BETA GLOBULIN: 0.8 G/DL (ref 0.5–1.1)
BETA PERCENT: 14 % (ref 11–19)
BILIRUB SERPL-MCNC: 0.18 MG/DL (ref 0.3–1.2)
BILIRUB SERPL-MCNC: 0.19 MG/DL (ref 0.3–1.2)
BILIRUB SERPL-MCNC: 0.21 MG/DL (ref 0.3–1.2)
BILIRUB SERPL-MCNC: 0.26 MG/DL (ref 0.3–1.2)
BILIRUB SERPL-MCNC: 0.3 MG/DL (ref 0.3–1.2)
BILIRUBIN URINE: ABNORMAL
BILIRUBIN URINE: NEGATIVE
BILIRUBIN URINE: NEGATIVE
BUN BLDV-MCNC: 13 MG/DL (ref 8–23)
BUN BLDV-MCNC: 13 MG/DL (ref 8–23)
BUN BLDV-MCNC: 14 MG/DL (ref 8–23)
BUN BLDV-MCNC: 15 MG/DL (ref 8–23)
BUN BLDV-MCNC: 16 MG/DL (ref 8–23)
BUN BLDV-MCNC: 17 MG/DL (ref 8–23)
BUN BLDV-MCNC: 20 MG/DL (ref 8–23)
BUN BLDV-MCNC: 21 MG/DL (ref 8–23)
BUN BLDV-MCNC: 23 MG/DL (ref 8–23)
BUN BLDV-MCNC: 24 MG/DL (ref 8–23)
BUN BLDV-MCNC: 25 MG/DL (ref 8–23)
BUN BLDV-MCNC: 25 MG/DL (ref 8–23)
BUN/CREAT BLD: ABNORMAL (ref 9–20)
CALCIUM SERPL-MCNC: 6.8 MG/DL (ref 8.6–10.4)
CALCIUM SERPL-MCNC: 6.9 MG/DL (ref 8.6–10.4)
CALCIUM SERPL-MCNC: 7.2 MG/DL (ref 8.6–10.4)
CALCIUM SERPL-MCNC: 7.3 MG/DL (ref 8.6–10.4)
CALCIUM SERPL-MCNC: 7.3 MG/DL (ref 8.6–10.4)
CALCIUM SERPL-MCNC: 7.4 MG/DL (ref 8.6–10.4)
CALCIUM SERPL-MCNC: 7.6 MG/DL (ref 8.6–10.4)
CALCIUM SERPL-MCNC: 7.6 MG/DL (ref 8.6–10.4)
CALCIUM SERPL-MCNC: 7.7 MG/DL (ref 8.6–10.4)
CALCIUM SERPL-MCNC: 7.9 MG/DL (ref 8.6–10.4)
CALCIUM SERPL-MCNC: 8.1 MG/DL (ref 8.6–10.4)
CALCIUM SERPL-MCNC: 8.6 MG/DL (ref 8.6–10.4)
CALCIUM SERPL-MCNC: 9 MG/DL (ref 8.6–10.4)
CALCIUM SERPL-MCNC: 9.2 MG/DL (ref 8.6–10.4)
CASTS UA: ABNORMAL /LPF
CHLORIDE BLD-SCNC: 101 MMOL/L (ref 98–107)
CHLORIDE BLD-SCNC: 106 MMOL/L (ref 98–107)
CHLORIDE BLD-SCNC: 107 MMOL/L (ref 98–107)
CHLORIDE BLD-SCNC: 108 MMOL/L (ref 98–107)
CHLORIDE BLD-SCNC: 108 MMOL/L (ref 98–107)
CHLORIDE BLD-SCNC: 109 MMOL/L (ref 98–107)
CHLORIDE BLD-SCNC: 111 MMOL/L (ref 98–107)
CHLORIDE BLD-SCNC: 112 MMOL/L (ref 98–107)
CHLORIDE BLD-SCNC: 112 MMOL/L (ref 98–107)
CHLORIDE BLD-SCNC: 115 MMOL/L (ref 98–107)
CHLORIDE BLD-SCNC: 117 MMOL/L (ref 98–107)
CHLORIDE BLD-SCNC: 118 MMOL/L (ref 98–107)
CHLORIDE BLD-SCNC: 118 MMOL/L (ref 98–107)
CHLORIDE, UR: 96 MMOL/L
CO2: 14 MMOL/L (ref 20–31)
CO2: 15 MMOL/L (ref 20–31)
CO2: 16 MMOL/L (ref 20–31)
CO2: 16 MMOL/L (ref 20–31)
CO2: 17 MMOL/L (ref 20–31)
CO2: 18 MMOL/L (ref 20–31)
CO2: 18 MMOL/L (ref 20–31)
CO2: 20 MMOL/L (ref 20–31)
CO2: 20 MMOL/L (ref 20–31)
CO2: 22 MMOL/L (ref 20–31)
CO2: 22 MMOL/L (ref 20–31)
CO2: 23 MMOL/L (ref 20–31)
CO2: 24 MMOL/L (ref 20–31)
CO2: 25 MMOL/L (ref 20–31)
CO2: 25 MMOL/L (ref 20–31)
COLOR: ABNORMAL
COLOR: YELLOW
COLOR: YELLOW
COMMENT UA: ABNORMAL
CREAT SERPL-MCNC: 0.54 MG/DL (ref 0.5–0.9)
CREAT SERPL-MCNC: 0.63 MG/DL (ref 0.5–0.9)
CREAT SERPL-MCNC: 0.75 MG/DL (ref 0.5–0.9)
CREAT SERPL-MCNC: 0.88 MG/DL (ref 0.5–0.9)
CREAT SERPL-MCNC: 0.89 MG/DL (ref 0.5–0.9)
CREAT SERPL-MCNC: 0.99 MG/DL (ref 0.5–0.9)
CREAT SERPL-MCNC: 1.31 MG/DL (ref 0.5–0.9)
CREAT SERPL-MCNC: 1.65 MG/DL (ref 0.5–0.9)
CREAT SERPL-MCNC: 1.71 MG/DL (ref 0.5–0.9)
CREAT SERPL-MCNC: 1.78 MG/DL (ref 0.5–0.9)
CREAT SERPL-MCNC: 2.11 MG/DL (ref 0.5–0.9)
CREAT SERPL-MCNC: 2.23 MG/DL (ref 0.5–0.9)
CREAT SERPL-MCNC: 2.24 MG/DL (ref 0.5–0.9)
CREAT SERPL-MCNC: 2.33 MG/DL (ref 0.5–0.9)
CREATININE URINE: 57.6 MG/DL (ref 28–217)
CRYSTALS, UA: ABNORMAL /HPF
CULTURE: ABNORMAL
CULTURE: ABNORMAL
CULTURE: NORMAL
DIFFERENTIAL TYPE: ABNORMAL
EKG ATRIAL RATE: 59 BPM
EKG ATRIAL RATE: 66 BPM
EKG P AXIS: 61 DEGREES
EKG P AXIS: 89 DEGREES
EKG P-R INTERVAL: 174 MS
EKG P-R INTERVAL: 184 MS
EKG Q-T INTERVAL: 434 MS
EKG Q-T INTERVAL: 458 MS
EKG QRS DURATION: 66 MS
EKG QRS DURATION: 76 MS
EKG QTC CALCULATION (BAZETT): 453 MS
EKG QTC CALCULATION (BAZETT): 454 MS
EKG R AXIS: -23 DEGREES
EKG R AXIS: -3 DEGREES
EKG T AXIS: 1 DEGREES
EKG T AXIS: 51 DEGREES
EKG VENTRICULAR RATE: 59 BPM
EKG VENTRICULAR RATE: 66 BPM
EOSINOPHIL,URINE: NORMAL
EOSINOPHILS RELATIVE PERCENT: 0 % (ref 0–4)
EOSINOPHILS RELATIVE PERCENT: 1 % (ref 0–4)
EOSINOPHILS RELATIVE PERCENT: 3 % (ref 0–4)
EOSINOPHILS RELATIVE PERCENT: 3 % (ref 0–4)
EOSINOPHILS RELATIVE PERCENT: 4 % (ref 0–4)
EPITHELIAL CELLS UA: ABNORMAL /HPF
FREE KAPPA/LAMBDA RATIO: 1.34 (ref 0.26–1.65)
GAMMA GLOBULIN %: 19 % (ref 9–20)
GAMMA GLOBULIN: 1 G/DL (ref 0.5–1.5)
GFR AFRICAN AMERICAN: 24 ML/MIN
GFR AFRICAN AMERICAN: 25 ML/MIN
GFR AFRICAN AMERICAN: 26 ML/MIN
GFR AFRICAN AMERICAN: 27 ML/MIN
GFR AFRICAN AMERICAN: 33 ML/MIN
GFR AFRICAN AMERICAN: 35 ML/MIN
GFR AFRICAN AMERICAN: 36 ML/MIN
GFR AFRICAN AMERICAN: 47 ML/MIN
GFR AFRICAN AMERICAN: >60 ML/MIN
GFR NON-AFRICAN AMERICAN: 20 ML/MIN
GFR NON-AFRICAN AMERICAN: 21 ML/MIN
GFR NON-AFRICAN AMERICAN: 21 ML/MIN
GFR NON-AFRICAN AMERICAN: 23 ML/MIN
GFR NON-AFRICAN AMERICAN: 27 ML/MIN
GFR NON-AFRICAN AMERICAN: 29 ML/MIN
GFR NON-AFRICAN AMERICAN: 30 ML/MIN
GFR NON-AFRICAN AMERICAN: 39 ML/MIN
GFR NON-AFRICAN AMERICAN: 54 ML/MIN
GFR NON-AFRICAN AMERICAN: >60 ML/MIN
GFR SERPL CREATININE-BSD FRML MDRD: ABNORMAL ML/MIN/{1.73_M2}
GLUCOSE BLD-MCNC: 102 MG/DL (ref 70–99)
GLUCOSE BLD-MCNC: 115 MG/DL (ref 70–99)
GLUCOSE BLD-MCNC: 115 MG/DL (ref 70–99)
GLUCOSE BLD-MCNC: 126 MG/DL (ref 70–99)
GLUCOSE BLD-MCNC: 140 MG/DL (ref 70–99)
GLUCOSE BLD-MCNC: 65 MG/DL (ref 70–99)
GLUCOSE BLD-MCNC: 70 MG/DL (ref 70–99)
GLUCOSE BLD-MCNC: 71 MG/DL (ref 65–105)
GLUCOSE BLD-MCNC: 72 MG/DL (ref 70–99)
GLUCOSE BLD-MCNC: 74 MG/DL (ref 70–99)
GLUCOSE BLD-MCNC: 78 MG/DL (ref 65–105)
GLUCOSE BLD-MCNC: 78 MG/DL (ref 70–99)
GLUCOSE BLD-MCNC: 83 MG/DL (ref 70–99)
GLUCOSE BLD-MCNC: 88 MG/DL (ref 70–99)
GLUCOSE BLD-MCNC: 91 MG/DL (ref 70–99)
GLUCOSE BLD-MCNC: 94 MG/DL (ref 70–99)
GLUCOSE URINE: NEGATIVE
HCT VFR BLD CALC: 29.9 % (ref 36–46)
HCT VFR BLD CALC: 30.6 % (ref 36–46)
HCT VFR BLD CALC: 31 % (ref 36–46)
HCT VFR BLD CALC: 32.9 % (ref 36–46)
HCT VFR BLD CALC: 33.2 % (ref 36–46)
HCT VFR BLD CALC: 34.3 % (ref 36–46)
HCT VFR BLD CALC: 34.8 % (ref 36–46)
HCT VFR BLD CALC: 36 % (ref 36–46)
HCT VFR BLD CALC: 39.3 % (ref 36–46)
HEMOGLOBIN: 10.2 G/DL (ref 12–16)
HEMOGLOBIN: 10.4 G/DL (ref 12–16)
HEMOGLOBIN: 10.5 G/DL (ref 12–16)
HEMOGLOBIN: 10.5 G/DL (ref 12–16)
HEMOGLOBIN: 11.1 G/DL (ref 12–16)
HEMOGLOBIN: 12.3 G/DL (ref 12–16)
HEMOGLOBIN: 9.3 G/DL (ref 12–16)
HEMOGLOBIN: 9.7 G/DL (ref 12–16)
HEMOGLOBIN: 9.8 G/DL (ref 12–16)
IMMATURE GRANULOCYTES: ABNORMAL %
INR BLD: 1.9
KAPPA FREE LIGHT CHAINS QNT: 5.18 MG/DL (ref 0.37–1.94)
KETONES, URINE: ABNORMAL
KETONES, URINE: NEGATIVE
KETONES, URINE: NEGATIVE
LAMBDA FREE LIGHT CHAINS QNT: 3.87 MG/DL (ref 0.57–2.63)
LEUKOCYTE ESTERASE, URINE: ABNORMAL
LEUKOCYTE ESTERASE, URINE: ABNORMAL
LEUKOCYTE ESTERASE, URINE: NEGATIVE
LYMPHOCYTES # BLD: 10 % (ref 24–44)
LYMPHOCYTES # BLD: 13 % (ref 24–44)
LYMPHOCYTES # BLD: 15 % (ref 24–44)
LYMPHOCYTES # BLD: 2 % (ref 24–44)
LYMPHOCYTES # BLD: 5 % (ref 24–44)
LYMPHOCYTES # BLD: 5 % (ref 24–44)
LYMPHOCYTES # BLD: 7 % (ref 24–44)
Lab: ABNORMAL
Lab: NORMAL
MAGNESIUM: 1.5 MG/DL (ref 1.6–2.6)
MAGNESIUM: 1.6 MG/DL (ref 1.6–2.6)
MCH RBC QN AUTO: 23.3 PG (ref 26–34)
MCH RBC QN AUTO: 23.3 PG (ref 26–34)
MCH RBC QN AUTO: 23.4 PG (ref 26–34)
MCH RBC QN AUTO: 23.6 PG (ref 26–34)
MCH RBC QN AUTO: 23.7 PG (ref 26–34)
MCH RBC QN AUTO: 23.8 PG (ref 26–34)
MCH RBC QN AUTO: 24 PG (ref 26–34)
MCHC RBC AUTO-ENTMCNC: 30.2 G/DL (ref 31–37)
MCHC RBC AUTO-ENTMCNC: 30.4 G/DL (ref 31–37)
MCHC RBC AUTO-ENTMCNC: 30.9 G/DL (ref 31–37)
MCHC RBC AUTO-ENTMCNC: 31 G/DL (ref 31–37)
MCHC RBC AUTO-ENTMCNC: 31.2 G/DL (ref 31–37)
MCHC RBC AUTO-ENTMCNC: 31.3 G/DL (ref 31–37)
MCHC RBC AUTO-ENTMCNC: 31.5 G/DL (ref 31–37)
MCHC RBC AUTO-ENTMCNC: 31.6 G/DL (ref 31–37)
MCHC RBC AUTO-ENTMCNC: 32.1 G/DL (ref 31–37)
MCV RBC AUTO: 74.2 FL (ref 80–100)
MCV RBC AUTO: 74.5 FL (ref 80–100)
MCV RBC AUTO: 74.9 FL (ref 80–100)
MCV RBC AUTO: 75.3 FL (ref 80–100)
MCV RBC AUTO: 76.4 FL (ref 80–100)
MCV RBC AUTO: 76.4 FL (ref 80–100)
MCV RBC AUTO: 76.6 FL (ref 80–100)
MCV RBC AUTO: 76.6 FL (ref 80–100)
MCV RBC AUTO: 79 FL (ref 80–100)
MONOCYTES # BLD: 2 % (ref 1–7)
MONOCYTES # BLD: 3 % (ref 1–7)
MONOCYTES # BLD: 4 % (ref 1–7)
MONOCYTES # BLD: 6 % (ref 1–7)
MONOCYTES # BLD: 7 % (ref 1–7)
MONOCYTES # BLD: 8 % (ref 1–7)
MONOCYTES # BLD: 8 % (ref 1–7)
MORPHOLOGY: ABNORMAL
MUCUS: ABNORMAL
NITRITE, URINE: NEGATIVE
NITRITE, URINE: NEGATIVE
NITRITE, URINE: POSITIVE
NRBC AUTOMATED: ABNORMAL PER 100 WBC
OTHER OBSERVATIONS UA: ABNORMAL
PATHOLOGIST: ABNORMAL
PDW BLD-RTO: 19.1 % (ref 11.5–14.9)
PDW BLD-RTO: 19.2 % (ref 11.5–14.9)
PDW BLD-RTO: 19.6 % (ref 11.5–14.9)
PDW BLD-RTO: 19.6 % (ref 11.5–14.9)
PDW BLD-RTO: 19.7 % (ref 11.5–14.9)
PDW BLD-RTO: 19.8 % (ref 11.5–14.9)
PDW BLD-RTO: 19.8 % (ref 11.5–14.9)
PDW BLD-RTO: 19.9 % (ref 11.5–14.9)
PDW BLD-RTO: 20.2 % (ref 11.5–14.9)
PH UA: 5 (ref 5–8)
PHOSPHORUS: 2 MG/DL (ref 2.6–4.5)
PHOSPHORUS: 2.2 MG/DL (ref 2.6–4.5)
PHOSPHORUS: 2.2 MG/DL (ref 2.6–4.5)
PHOSPHORUS: 2.3 MG/DL (ref 2.6–4.5)
PHOSPHORUS: 2.6 MG/DL (ref 2.6–4.5)
PLATELET # BLD: 192 K/UL (ref 150–450)
PLATELET # BLD: 200 K/UL (ref 150–450)
PLATELET # BLD: 202 K/UL (ref 150–450)
PLATELET # BLD: 208 K/UL (ref 150–450)
PLATELET # BLD: 214 K/UL (ref 150–450)
PLATELET # BLD: 234 K/UL (ref 150–450)
PLATELET # BLD: 246 K/UL (ref 150–450)
PLATELET # BLD: 248 K/UL (ref 150–450)
PLATELET # BLD: 250 K/UL (ref 150–450)
PLATELET ESTIMATE: ABNORMAL
PMV BLD AUTO: 8.7 FL (ref 6–12)
PMV BLD AUTO: 8.8 FL (ref 6–12)
PMV BLD AUTO: 9 FL (ref 6–12)
PMV BLD AUTO: 9 FL (ref 6–12)
PMV BLD AUTO: 9.4 FL (ref 6–12)
PMV BLD AUTO: 9.5 FL (ref 6–12)
PMV BLD AUTO: 9.6 FL (ref 6–12)
PMV BLD AUTO: 9.7 FL (ref 6–12)
PMV BLD AUTO: 9.8 FL (ref 6–12)
POTASSIUM SERPL-SCNC: 2.8 MMOL/L (ref 3.7–5.3)
POTASSIUM SERPL-SCNC: 3 MMOL/L (ref 3.7–5.3)
POTASSIUM SERPL-SCNC: 3.1 MMOL/L (ref 3.7–5.3)
POTASSIUM SERPL-SCNC: 3.2 MMOL/L (ref 3.7–5.3)
POTASSIUM SERPL-SCNC: 3.3 MMOL/L (ref 3.7–5.3)
POTASSIUM SERPL-SCNC: 3.6 MMOL/L (ref 3.7–5.3)
POTASSIUM SERPL-SCNC: 3.6 MMOL/L (ref 3.7–5.3)
POTASSIUM SERPL-SCNC: 3.8 MMOL/L (ref 3.7–5.3)
POTASSIUM SERPL-SCNC: 3.9 MMOL/L (ref 3.7–5.3)
POTASSIUM SERPL-SCNC: 3.9 MMOL/L (ref 3.7–5.3)
POTASSIUM SERPL-SCNC: 4 MMOL/L (ref 3.7–5.3)
POTASSIUM SERPL-SCNC: 4 MMOL/L (ref 3.7–5.3)
POTASSIUM SERPL-SCNC: 4.2 MMOL/L (ref 3.7–5.3)
POTASSIUM SERPL-SCNC: 4.3 MMOL/L (ref 3.7–5.3)
POTASSIUM SERPL-SCNC: 4.9 MMOL/L (ref 3.7–5.3)
POTASSIUM, UR: 16.7 MMOL/L
PROTEIN ELECTROPHORESIS, SERUM: ABNORMAL
PROTEIN UA: ABNORMAL
PROTEIN UA: ABNORMAL
PROTEIN UA: NEGATIVE
PROTHROMBIN TIME: 21.9 SEC (ref 11.8–14.6)
RBC # BLD: 3.99 M/UL (ref 4–5.2)
RBC # BLD: 4.12 M/UL (ref 4–5.2)
RBC # BLD: 4.15 M/UL (ref 4–5.2)
RBC # BLD: 4.3 M/UL (ref 4–5.2)
RBC # BLD: 4.4 M/UL (ref 4–5.2)
RBC # BLD: 4.41 M/UL (ref 4–5.2)
RBC # BLD: 4.47 M/UL (ref 4–5.2)
RBC # BLD: 4.71 M/UL (ref 4–5.2)
RBC # BLD: 5.13 M/UL (ref 4–5.2)
RBC # BLD: ABNORMAL 10*6/UL
RBC UA: ABNORMAL /HPF
REASON FOR REJECTION: NORMAL
RENAL EPITHELIAL, UA: ABNORMAL /HPF
RENIN ACTIVITY: 0.1 NG/ML/HR
RENIN COMMENT: NORMAL
SEG NEUTROPHILS: 75 % (ref 36–66)
SEG NEUTROPHILS: 75 % (ref 36–66)
SEG NEUTROPHILS: 82 % (ref 36–66)
SEG NEUTROPHILS: 85 % (ref 36–66)
SEG NEUTROPHILS: 87 % (ref 36–66)
SEG NEUTROPHILS: 92 % (ref 36–66)
SEG NEUTROPHILS: 93 % (ref 36–66)
SEGMENTED NEUTROPHILS ABSOLUTE COUNT: 12.18 K/UL (ref 1.3–9.1)
SEGMENTED NEUTROPHILS ABSOLUTE COUNT: 12.98 K/UL (ref 1.3–9.1)
SEGMENTED NEUTROPHILS ABSOLUTE COUNT: 5.25 K/UL (ref 1.3–9.1)
SEGMENTED NEUTROPHILS ABSOLUTE COUNT: 5.83 K/UL (ref 1.3–9.1)
SEGMENTED NEUTROPHILS ABSOLUTE COUNT: 6.7 K/UL (ref 1.3–9.1)
SEGMENTED NEUTROPHILS ABSOLUTE COUNT: 9.86 K/UL (ref 1.3–9.1)
SEGMENTED NEUTROPHILS ABSOLUTE COUNT: 9.95 K/UL (ref 1.3–9.1)
SODIUM BLD-SCNC: 139 MMOL/L (ref 135–144)
SODIUM BLD-SCNC: 140 MMOL/L (ref 135–144)
SODIUM BLD-SCNC: 140 MMOL/L (ref 135–144)
SODIUM BLD-SCNC: 141 MMOL/L (ref 135–144)
SODIUM BLD-SCNC: 143 MMOL/L (ref 135–144)
SODIUM BLD-SCNC: 144 MMOL/L (ref 135–144)
SODIUM BLD-SCNC: 145 MMOL/L (ref 135–144)
SODIUM BLD-SCNC: 146 MMOL/L (ref 135–144)
SODIUM BLD-SCNC: 147 MMOL/L (ref 135–144)
SODIUM,UR: 75 MMOL/L
SPECIFIC GRAVITY UA: 1 (ref 1–1.03)
SPECIFIC GRAVITY UA: 1.01 (ref 1–1.03)
SPECIFIC GRAVITY UA: 1.03 (ref 1–1.03)
SPECIMEN DESCRIPTION: ABNORMAL
SPECIMEN DESCRIPTION: NORMAL
T3 FREE: 1.84 PG/ML (ref 2.02–4.43)
T4 TOTAL: 7.2 UG/DL (ref 4.5–12)
TOTAL PROT. SUM,%: 100 % (ref 98–102)
TOTAL PROT. SUM: 5.3 G/DL (ref 6.3–8.2)
TOTAL PROTEIN, URINE: 25 MG/DL
TOTAL PROTEIN: 5.3 G/DL (ref 6.4–8.3)
TOTAL PROTEIN: 5.5 G/DL (ref 6.4–8.3)
TOTAL PROTEIN: 5.5 G/DL (ref 6.4–8.3)
TOTAL PROTEIN: 5.8 G/DL (ref 6.4–8.3)
TOTAL PROTEIN: 5.8 G/DL (ref 6.4–8.3)
TOTAL PROTEIN: 6.5 G/DL (ref 6.4–8.3)
TRICHOMONAS: ABNORMAL
TSH SERPL DL<=0.05 MIU/L-ACNC: 1.75 MIU/L (ref 0.3–5)
TURBIDITY: ABNORMAL
URINE HGB: ABNORMAL
URINE HGB: NEGATIVE
URINE HGB: NEGATIVE
UROBILINOGEN, URINE: NORMAL
WBC # BLD: 10.7 K/UL (ref 3.5–11)
WBC # BLD: 11.6 K/UL (ref 3.5–11)
WBC # BLD: 14 K/UL (ref 3.5–11)
WBC # BLD: 14.1 K/UL (ref 3.5–11)
WBC # BLD: 7 K/UL (ref 3.5–11)
WBC # BLD: 7.1 K/UL (ref 3.5–11)
WBC # BLD: 7.8 K/UL (ref 3.5–11)
WBC # BLD: 8.8 K/UL (ref 3.5–11)
WBC # BLD: 9.6 K/UL (ref 3.5–11)
WBC # BLD: ABNORMAL 10*3/UL
WBC UA: ABNORMAL /HPF
YEAST: ABNORMAL
ZZ NTE CLEAN UP: ORDERED TEST: NORMAL
ZZ NTE WITH NAME CLEAN UP: SPECIMEN SOURCE: NORMAL

## 2020-01-01 PROCEDURE — 6370000000 HC RX 637 (ALT 250 FOR IP): Performed by: INTERNAL MEDICINE

## 2020-01-01 PROCEDURE — 6360000002 HC RX W HCPCS: Performed by: INTERNAL MEDICINE

## 2020-01-01 PROCEDURE — 36415 COLL VENOUS BLD VENIPUNCTURE: CPT

## 2020-01-01 PROCEDURE — 96366 THER/PROPH/DIAG IV INF ADDON: CPT

## 2020-01-01 PROCEDURE — 93005 ELECTROCARDIOGRAM TRACING: CPT | Performed by: INTERNAL MEDICINE

## 2020-01-01 PROCEDURE — 81001 URINALYSIS AUTO W/SCOPE: CPT

## 2020-01-01 PROCEDURE — 1200000000 HC SEMI PRIVATE

## 2020-01-01 PROCEDURE — 80069 RENAL FUNCTION PANEL: CPT

## 2020-01-01 PROCEDURE — 76000 FLUOROSCOPY <1 HR PHYS/QHP: CPT | Performed by: RADIOLOGY

## 2020-01-01 PROCEDURE — 6370000000 HC RX 637 (ALT 250 FOR IP): Performed by: FAMILY MEDICINE

## 2020-01-01 PROCEDURE — 2580000003 HC RX 258: Performed by: FAMILY MEDICINE

## 2020-01-01 PROCEDURE — 6360000002 HC RX W HCPCS: Performed by: FAMILY MEDICINE

## 2020-01-01 PROCEDURE — 84481 FREE ASSAY (FT-3): CPT

## 2020-01-01 PROCEDURE — 85025 COMPLETE CBC W/AUTO DIFF WBC: CPT

## 2020-01-01 PROCEDURE — 99233 SBSQ HOSP IP/OBS HIGH 50: CPT | Performed by: OBSTETRICS & GYNECOLOGY

## 2020-01-01 PROCEDURE — 84165 PROTEIN E-PHORESIS SERUM: CPT

## 2020-01-01 PROCEDURE — 99232 SBSQ HOSP IP/OBS MODERATE 35: CPT | Performed by: INTERNAL MEDICINE

## 2020-01-01 PROCEDURE — 2060000000 HC ICU INTERMEDIATE R&B

## 2020-01-01 PROCEDURE — APPNB30 APP NON BILLABLE TIME 0-30 MINS: Performed by: NURSE PRACTITIONER

## 2020-01-01 PROCEDURE — 87086 URINE CULTURE/COLONY COUNT: CPT

## 2020-01-01 PROCEDURE — 97530 THERAPEUTIC ACTIVITIES: CPT

## 2020-01-01 PROCEDURE — 2500000003 HC RX 250 WO HCPCS: Performed by: INTERNAL MEDICINE

## 2020-01-01 PROCEDURE — 80053 COMPREHEN METABOLIC PANEL: CPT

## 2020-01-01 PROCEDURE — 84244 ASSAY OF RENIN: CPT

## 2020-01-01 PROCEDURE — 82947 ASSAY GLUCOSE BLOOD QUANT: CPT

## 2020-01-01 PROCEDURE — 80051 ELECTROLYTE PANEL: CPT

## 2020-01-01 PROCEDURE — 97110 THERAPEUTIC EXERCISES: CPT

## 2020-01-01 PROCEDURE — 2500000003 HC RX 250 WO HCPCS: Performed by: FAMILY MEDICINE

## 2020-01-01 PROCEDURE — 82436 ASSAY OF URINE CHLORIDE: CPT

## 2020-01-01 PROCEDURE — 83883 ASSAY NEPHELOMETRY NOT SPEC: CPT

## 2020-01-01 PROCEDURE — 84133 ASSAY OF URINE POTASSIUM: CPT

## 2020-01-01 PROCEDURE — 96372 THER/PROPH/DIAG INJ SC/IM: CPT

## 2020-01-01 PROCEDURE — 83735 ASSAY OF MAGNESIUM: CPT

## 2020-01-01 PROCEDURE — 84436 ASSAY OF TOTAL THYROXINE: CPT

## 2020-01-01 PROCEDURE — C9113 INJ PANTOPRAZOLE SODIUM, VIA: HCPCS | Performed by: FAMILY MEDICINE

## 2020-01-01 PROCEDURE — 80048 BASIC METABOLIC PNL TOTAL CA: CPT

## 2020-01-01 PROCEDURE — 85027 COMPLETE CBC AUTOMATED: CPT

## 2020-01-01 PROCEDURE — C1769 GUIDE WIRE: HCPCS

## 2020-01-01 PROCEDURE — 85610 PROTHROMBIN TIME: CPT

## 2020-01-01 PROCEDURE — 96365 THER/PROPH/DIAG IV INF INIT: CPT

## 2020-01-01 PROCEDURE — 93010 ELECTROCARDIOGRAM REPORT: CPT | Performed by: INTERNAL MEDICINE

## 2020-01-01 PROCEDURE — 82570 ASSAY OF URINE CREATININE: CPT

## 2020-01-01 PROCEDURE — 84443 ASSAY THYROID STIM HORMONE: CPT

## 2020-01-01 PROCEDURE — 97535 SELF CARE MNGMENT TRAINING: CPT

## 2020-01-01 PROCEDURE — 82088 ASSAY OF ALDOSTERONE: CPT

## 2020-01-01 PROCEDURE — 84132 ASSAY OF SERUM POTASSIUM: CPT

## 2020-01-01 PROCEDURE — 97162 PT EVAL MOD COMPLEX 30 MIN: CPT

## 2020-01-01 PROCEDURE — 84300 ASSAY OF URINE SODIUM: CPT

## 2020-01-01 PROCEDURE — 97165 OT EVAL LOW COMPLEX 30 MIN: CPT

## 2020-01-01 PROCEDURE — 96367 TX/PROPH/DG ADDL SEQ IV INF: CPT

## 2020-01-01 PROCEDURE — 87186 SC STD MICRODIL/AGAR DIL: CPT

## 2020-01-01 PROCEDURE — 2580000003 HC RX 258: Performed by: INTERNAL MEDICINE

## 2020-01-01 PROCEDURE — 87088 URINE BACTERIA CULTURE: CPT

## 2020-01-01 PROCEDURE — 51702 INSERT TEMP BLADDER CATH: CPT

## 2020-01-01 PROCEDURE — 36410 VNPNXR 3YR/> PHY/QHP DX/THER: CPT | Performed by: RADIOLOGY

## 2020-01-01 PROCEDURE — 02HV33Z INSERTION OF INFUSION DEVICE INTO SUPERIOR VENA CAVA, PERCUTANEOUS APPROACH: ICD-10-PCS | Performed by: RADIOLOGY

## 2020-01-01 PROCEDURE — 84156 ASSAY OF PROTEIN URINE: CPT

## 2020-01-01 PROCEDURE — G0378 HOSPITAL OBSERVATION PER HR: HCPCS

## 2020-01-01 PROCEDURE — 99222 1ST HOSP IP/OBS MODERATE 55: CPT | Performed by: INTERNAL MEDICINE

## 2020-01-01 PROCEDURE — 87205 SMEAR GRAM STAIN: CPT

## 2020-01-01 PROCEDURE — 84155 ASSAY OF PROTEIN SERUM: CPT

## 2020-01-01 PROCEDURE — 51798 US URINE CAPACITY MEASURE: CPT

## 2020-01-01 PROCEDURE — 96376 TX/PRO/DX INJ SAME DRUG ADON: CPT

## 2020-01-01 PROCEDURE — 76770 US EXAM ABDO BACK WALL COMP: CPT

## 2020-01-01 PROCEDURE — 87077 CULTURE AEROBIC IDENTIFY: CPT

## 2020-01-01 PROCEDURE — 76937 US GUIDE VASCULAR ACCESS: CPT | Performed by: RADIOLOGY

## 2020-01-01 RX ORDER — POLYETHYLENE GLYCOL 3350 17 G/17G
17 POWDER, FOR SOLUTION ORAL NIGHTLY
Status: DISCONTINUED | OUTPATIENT
Start: 2020-01-01 | End: 2020-01-01

## 2020-01-01 RX ORDER — ACETAMINOPHEN 325 MG/1
650 TABLET ORAL EVERY 4 HOURS PRN
Status: DISCONTINUED | OUTPATIENT
Start: 2020-01-01 | End: 2020-01-01 | Stop reason: HOSPADM

## 2020-01-01 RX ORDER — HYDROCODONE BITARTRATE AND ACETAMINOPHEN 5; 325 MG/1; MG/1
1 TABLET ORAL EVERY 8 HOURS PRN
Qty: 9 TABLET | Refills: 0 | Status: SHIPPED | OUTPATIENT
Start: 2020-01-01 | End: 2020-01-01

## 2020-01-01 RX ORDER — 0.9 % SODIUM CHLORIDE 0.9 %
1000 INTRAVENOUS SOLUTION INTRAVENOUS ONCE
Status: COMPLETED | OUTPATIENT
Start: 2020-01-01 | End: 2020-01-01

## 2020-01-01 RX ORDER — DEXTROSE, SODIUM CHLORIDE, AND POTASSIUM CHLORIDE 5; .9; .15 G/100ML; G/100ML; G/100ML
INJECTION INTRAVENOUS CONTINUOUS
Status: DISCONTINUED | OUTPATIENT
Start: 2020-01-01 | End: 2020-01-01 | Stop reason: HOSPADM

## 2020-01-01 RX ORDER — HYDRALAZINE HYDROCHLORIDE 20 MG/ML
20 INJECTION INTRAMUSCULAR; INTRAVENOUS EVERY 6 HOURS PRN
Status: DISCONTINUED | OUTPATIENT
Start: 2020-01-01 | End: 2020-01-01

## 2020-01-01 RX ORDER — POLYETHYLENE GLYCOL 3350 17 G/17G
17 POWDER, FOR SOLUTION ORAL NIGHTLY
Status: DISCONTINUED | OUTPATIENT
Start: 2020-01-01 | End: 2020-01-01 | Stop reason: HOSPADM

## 2020-01-01 RX ORDER — POTASSIUM CHLORIDE 7.45 MG/ML
10 INJECTION INTRAVENOUS PRN
Status: DISCONTINUED | OUTPATIENT
Start: 2020-01-01 | End: 2020-01-01

## 2020-01-01 RX ORDER — POTASSIUM CHLORIDE 7.45 MG/ML
10 INJECTION INTRAVENOUS PRN
Status: DISCONTINUED | OUTPATIENT
Start: 2020-01-01 | End: 2020-01-01 | Stop reason: HOSPADM

## 2020-01-01 RX ORDER — CLONIDINE 0.3 MG/24H
1 PATCH, EXTENDED RELEASE TRANSDERMAL WEEKLY
Status: DISCONTINUED | OUTPATIENT
Start: 2020-01-01 | End: 2020-01-01 | Stop reason: HOSPADM

## 2020-01-01 RX ORDER — PANTOPRAZOLE SODIUM 40 MG/10ML
40 INJECTION, POWDER, LYOPHILIZED, FOR SOLUTION INTRAVENOUS DAILY
Status: DISCONTINUED | OUTPATIENT
Start: 2020-01-01 | End: 2020-01-01 | Stop reason: HOSPADM

## 2020-01-01 RX ORDER — POTASSIUM CHLORIDE 20 MEQ/1
40 TABLET, EXTENDED RELEASE ORAL PRN
Status: DISCONTINUED | OUTPATIENT
Start: 2020-01-01 | End: 2020-01-01

## 2020-01-01 RX ORDER — ONDANSETRON 2 MG/ML
4 INJECTION INTRAMUSCULAR; INTRAVENOUS EVERY 6 HOURS PRN
Status: DISCONTINUED | OUTPATIENT
Start: 2020-01-01 | End: 2020-01-01 | Stop reason: HOSPADM

## 2020-01-01 RX ORDER — FUROSEMIDE 10 MG/ML
40 INJECTION INTRAMUSCULAR; INTRAVENOUS ONCE
Status: COMPLETED | OUTPATIENT
Start: 2020-01-01 | End: 2020-01-01

## 2020-01-01 RX ORDER — POTASSIUM CHLORIDE 20 MEQ/1
40 TABLET, EXTENDED RELEASE ORAL PRN
Status: DISCONTINUED | OUTPATIENT
Start: 2020-01-01 | End: 2020-01-01 | Stop reason: HOSPADM

## 2020-01-01 RX ORDER — MORPHINE SULFATE 2 MG/ML
1 INJECTION, SOLUTION INTRAMUSCULAR; INTRAVENOUS ONCE
Status: COMPLETED | OUTPATIENT
Start: 2020-01-01 | End: 2020-01-01

## 2020-01-01 RX ORDER — AMLODIPINE BESYLATE 10 MG/1
10 TABLET ORAL DAILY
Status: DISCONTINUED | OUTPATIENT
Start: 2020-01-01 | End: 2020-01-01 | Stop reason: HOSPADM

## 2020-01-01 RX ORDER — CIPROFLOXACIN 2 MG/ML
400 INJECTION, SOLUTION INTRAVENOUS EVERY 12 HOURS
Status: DISCONTINUED | OUTPATIENT
Start: 2020-01-01 | End: 2020-01-01

## 2020-01-01 RX ORDER — HYDRALAZINE HYDROCHLORIDE 20 MG/ML
25 INJECTION INTRAMUSCULAR; INTRAVENOUS EVERY 8 HOURS
Status: DISCONTINUED | OUTPATIENT
Start: 2020-01-01 | End: 2020-01-01 | Stop reason: HOSPADM

## 2020-01-01 RX ORDER — CLONIDINE HYDROCHLORIDE 0.3 MG/1
0.3 TABLET ORAL 2 TIMES DAILY
Status: DISCONTINUED | OUTPATIENT
Start: 2020-01-01 | End: 2020-01-01

## 2020-01-01 RX ORDER — SENNA AND DOCUSATE SODIUM 50; 8.6 MG/1; MG/1
1 TABLET, FILM COATED ORAL NIGHTLY PRN
Status: DISCONTINUED | OUTPATIENT
Start: 2020-01-01 | End: 2020-01-01 | Stop reason: HOSPADM

## 2020-01-01 RX ORDER — SPIRONOLACTONE 25 MG/1
25 TABLET ORAL DAILY
Status: DISCONTINUED | OUTPATIENT
Start: 2020-01-01 | End: 2020-01-01

## 2020-01-01 RX ORDER — ACETAMINOPHEN 650 MG/1
650 SUPPOSITORY RECTAL EVERY 4 HOURS PRN
Status: DISCONTINUED | OUTPATIENT
Start: 2020-01-01 | End: 2020-01-01 | Stop reason: HOSPADM

## 2020-01-01 RX ORDER — SUCRALFATE 1 G/1
1 TABLET ORAL 4 TIMES DAILY
Status: DISCONTINUED | OUTPATIENT
Start: 2020-01-01 | End: 2020-01-01 | Stop reason: HOSPADM

## 2020-01-01 RX ORDER — SODIUM BICARBONATE 650 MG/1
650 TABLET ORAL 3 TIMES DAILY
Status: DISCONTINUED | OUTPATIENT
Start: 2020-01-01 | End: 2020-01-01 | Stop reason: HOSPADM

## 2020-01-01 RX ORDER — SODIUM CHLORIDE 9 MG/ML
10 INJECTION INTRAVENOUS DAILY
Status: DISCONTINUED | OUTPATIENT
Start: 2020-01-01 | End: 2020-01-01 | Stop reason: HOSPADM

## 2020-01-01 RX ORDER — CLONIDINE 0.3 MG/24H
1 PATCH, EXTENDED RELEASE TRANSDERMAL WEEKLY
Qty: 4 PATCH | Refills: 0
Start: 2020-01-01

## 2020-01-01 RX ORDER — POLYETHYLENE GLYCOL 3350 17 G/17G
17 POWDER, FOR SOLUTION ORAL DAILY PRN
Status: DISCONTINUED | OUTPATIENT
Start: 2020-01-01 | End: 2020-01-01 | Stop reason: HOSPADM

## 2020-01-01 RX ORDER — MAGNESIUM SULFATE 1 G/100ML
1 INJECTION INTRAVENOUS PRN
Status: DISCONTINUED | OUTPATIENT
Start: 2020-01-01 | End: 2020-01-01

## 2020-01-01 RX ORDER — SODIUM CHLORIDE 0.9 % (FLUSH) 0.9 %
10 SYRINGE (ML) INJECTION PRN
Status: DISCONTINUED | OUTPATIENT
Start: 2020-01-01 | End: 2020-01-01 | Stop reason: HOSPADM

## 2020-01-01 RX ORDER — SPIRONOLACTONE 25 MG/1
25 TABLET ORAL DAILY
Qty: 30 TABLET | Refills: 3 | Status: SHIPPED | OUTPATIENT
Start: 2020-01-01

## 2020-01-01 RX ORDER — SODIUM CHLORIDE 0.9 % (FLUSH) 0.9 %
10 SYRINGE (ML) INJECTION EVERY 12 HOURS SCHEDULED
Status: DISCONTINUED | OUTPATIENT
Start: 2020-01-01 | End: 2020-01-01 | Stop reason: HOSPADM

## 2020-01-01 RX ORDER — HYDRALAZINE HYDROCHLORIDE 20 MG/ML
20 INJECTION INTRAMUSCULAR; INTRAVENOUS EVERY 4 HOURS PRN
Status: DISCONTINUED | OUTPATIENT
Start: 2020-01-01 | End: 2020-01-01 | Stop reason: HOSPADM

## 2020-01-01 RX ADMIN — POTASSIUM CHLORIDE 40 MEQ: 1500 TABLET, EXTENDED RELEASE ORAL at 09:25

## 2020-01-01 RX ADMIN — PANTOPRAZOLE SODIUM 40 MG: 40 TABLET, DELAYED RELEASE ORAL at 07:51

## 2020-01-01 RX ADMIN — SUCRALFATE 1 G: 1 TABLET ORAL at 20:57

## 2020-01-01 RX ADMIN — HYDRALAZINE HYDROCHLORIDE 25 MG: 20 INJECTION INTRAMUSCULAR; INTRAVENOUS at 02:35

## 2020-01-01 RX ADMIN — SUCRALFATE 1 G: 1 TABLET ORAL at 16:29

## 2020-01-01 RX ADMIN — SUCRALFATE 1 G: 1 TABLET ORAL at 08:19

## 2020-01-01 RX ADMIN — HYDRALAZINE HYDROCHLORIDE 75 MG: 50 TABLET, FILM COATED ORAL at 08:46

## 2020-01-01 RX ADMIN — POLYETHYLENE GLYCOL 3350 17 G: 17 POWDER, FOR SOLUTION ORAL at 20:35

## 2020-01-01 RX ADMIN — PANTOPRAZOLE SODIUM 40 MG: 40 TABLET, DELAYED RELEASE ORAL at 08:47

## 2020-01-01 RX ADMIN — SUCRALFATE 1 G: 1 TABLET ORAL at 13:00

## 2020-01-01 RX ADMIN — SODIUM CHLORIDE: 9 INJECTION, SOLUTION INTRAVENOUS at 19:21

## 2020-01-01 RX ADMIN — CIPROFLOXACIN 400 MG: 2 INJECTION, SOLUTION INTRAVENOUS at 01:25

## 2020-01-01 RX ADMIN — HYDRALAZINE HYDROCHLORIDE 25 MG: 20 INJECTION INTRAMUSCULAR; INTRAVENOUS at 19:12

## 2020-01-01 RX ADMIN — CARVEDILOL 25 MG: 25 TABLET, FILM COATED ORAL at 09:28

## 2020-01-01 RX ADMIN — SUCRALFATE 1 G: 1 TABLET ORAL at 07:29

## 2020-01-01 RX ADMIN — ENOXAPARIN SODIUM 30 MG: 30 INJECTION SUBCUTANEOUS at 07:50

## 2020-01-01 RX ADMIN — HYDRALAZINE HYDROCHLORIDE 75 MG: 50 TABLET, FILM COATED ORAL at 21:53

## 2020-01-01 RX ADMIN — POTASSIUM CHLORIDE, DEXTROSE MONOHYDRATE AND SODIUM CHLORIDE: 150; 5; 900 INJECTION, SOLUTION INTRAVENOUS at 12:25

## 2020-01-01 RX ADMIN — HYDRALAZINE HYDROCHLORIDE 75 MG: 50 TABLET, FILM COATED ORAL at 21:42

## 2020-01-01 RX ADMIN — HYDRALAZINE HYDROCHLORIDE 25 MG: 20 INJECTION INTRAMUSCULAR; INTRAVENOUS at 21:06

## 2020-01-01 RX ADMIN — Medication 10 ML: at 00:03

## 2020-01-01 RX ADMIN — HYDRALAZINE HYDROCHLORIDE 75 MG: 50 TABLET, FILM COATED ORAL at 08:19

## 2020-01-01 RX ADMIN — HYDRALAZINE HYDROCHLORIDE 75 MG: 50 TABLET, FILM COATED ORAL at 07:51

## 2020-01-01 RX ADMIN — SPIRONOLACTONE 25 MG: 25 TABLET, FILM COATED ORAL at 07:31

## 2020-01-01 RX ADMIN — ONDANSETRON 4 MG: 2 INJECTION INTRAMUSCULAR; INTRAVENOUS at 13:05

## 2020-01-01 RX ADMIN — METOPROLOL TARTRATE 5 MG: 1 INJECTION, SOLUTION INTRAVENOUS at 07:22

## 2020-01-01 RX ADMIN — HYDRALAZINE HYDROCHLORIDE 20 MG: 20 INJECTION INTRAMUSCULAR; INTRAVENOUS at 21:09

## 2020-01-01 RX ADMIN — CARVEDILOL 25 MG: 25 TABLET, FILM COATED ORAL at 08:19

## 2020-01-01 RX ADMIN — SUCRALFATE 1 G: 1 TABLET ORAL at 09:28

## 2020-01-01 RX ADMIN — SODIUM BICARBONATE 650 MG: 650 TABLET ORAL at 20:53

## 2020-01-01 RX ADMIN — SUCRALFATE 1 G: 1 TABLET ORAL at 21:46

## 2020-01-01 RX ADMIN — HYDRALAZINE HYDROCHLORIDE 25 MG: 20 INJECTION INTRAMUSCULAR; INTRAVENOUS at 11:15

## 2020-01-01 RX ADMIN — ENOXAPARIN SODIUM 40 MG: 40 INJECTION SUBCUTANEOUS at 08:47

## 2020-01-01 RX ADMIN — SUCRALFATE 1 G: 1 TABLET ORAL at 12:49

## 2020-01-01 RX ADMIN — ONDANSETRON 4 MG: 2 INJECTION INTRAMUSCULAR; INTRAVENOUS at 16:47

## 2020-01-01 RX ADMIN — SUCRALFATE 1 G: 1 TABLET ORAL at 07:51

## 2020-01-01 RX ADMIN — ONDANSETRON 4 MG: 2 INJECTION INTRAMUSCULAR; INTRAVENOUS at 09:00

## 2020-01-01 RX ADMIN — ENOXAPARIN SODIUM 40 MG: 40 INJECTION SUBCUTANEOUS at 09:28

## 2020-01-01 RX ADMIN — ENOXAPARIN SODIUM 40 MG: 40 INJECTION SUBCUTANEOUS at 07:31

## 2020-01-01 RX ADMIN — HYDRALAZINE HYDROCHLORIDE 75 MG: 50 TABLET, FILM COATED ORAL at 20:58

## 2020-01-01 RX ADMIN — METOPROLOL TARTRATE 5 MG: 1 INJECTION, SOLUTION INTRAVENOUS at 19:45

## 2020-01-01 RX ADMIN — CEFAZOLIN 500 MG: 1 INJECTION, POWDER, FOR SOLUTION INTRAMUSCULAR; INTRAVENOUS at 01:57

## 2020-01-01 RX ADMIN — HYDRALAZINE HYDROCHLORIDE 25 MG: 20 INJECTION INTRAMUSCULAR; INTRAVENOUS at 18:46

## 2020-01-01 RX ADMIN — ENOXAPARIN SODIUM 40 MG: 40 INJECTION SUBCUTANEOUS at 08:19

## 2020-01-01 RX ADMIN — PANTOPRAZOLE SODIUM 40 MG: 40 TABLET, DELAYED RELEASE ORAL at 07:31

## 2020-01-01 RX ADMIN — PANTOPRAZOLE SODIUM 40 MG: 40 TABLET, DELAYED RELEASE ORAL at 09:28

## 2020-01-01 RX ADMIN — Medication 10 ML: at 21:01

## 2020-01-01 RX ADMIN — METOPROLOL TARTRATE 5 MG: 1 INJECTION, SOLUTION INTRAVENOUS at 06:16

## 2020-01-01 RX ADMIN — CARVEDILOL 25 MG: 25 TABLET, FILM COATED ORAL at 16:28

## 2020-01-01 RX ADMIN — SODIUM CHLORIDE 10 ML: 9 INJECTION, SOLUTION INTRAMUSCULAR; INTRAVENOUS; SUBCUTANEOUS at 11:29

## 2020-01-01 RX ADMIN — HYDRALAZINE HYDROCHLORIDE 25 MG: 20 INJECTION INTRAMUSCULAR; INTRAVENOUS at 04:22

## 2020-01-01 RX ADMIN — SODIUM CHLORIDE: 9 INJECTION, SOLUTION INTRAVENOUS at 00:50

## 2020-01-01 RX ADMIN — CARVEDILOL 25 MG: 25 TABLET, FILM COATED ORAL at 18:11

## 2020-01-01 RX ADMIN — POLYETHYLENE GLYCOL 3350 17 G: 17 POWDER, FOR SOLUTION ORAL at 21:53

## 2020-01-01 RX ADMIN — FUROSEMIDE 40 MG: 10 INJECTION, SOLUTION INTRAMUSCULAR; INTRAVENOUS at 06:10

## 2020-01-01 RX ADMIN — HYDRALAZINE HYDROCHLORIDE 25 MG: 20 INJECTION INTRAMUSCULAR; INTRAVENOUS at 10:51

## 2020-01-01 RX ADMIN — HYDRALAZINE HYDROCHLORIDE 25 MG: 20 INJECTION INTRAMUSCULAR; INTRAVENOUS at 12:30

## 2020-01-01 RX ADMIN — HYDRALAZINE HYDROCHLORIDE 25 MG: 20 INJECTION INTRAMUSCULAR; INTRAVENOUS at 18:40

## 2020-01-01 RX ADMIN — ENOXAPARIN SODIUM 40 MG: 40 INJECTION SUBCUTANEOUS at 09:33

## 2020-01-01 RX ADMIN — SIMVASTATIN 40 MG: 40 TABLET, FILM COATED ORAL at 21:53

## 2020-01-01 RX ADMIN — SIMVASTATIN 40 MG: 40 TABLET, FILM COATED ORAL at 20:15

## 2020-01-01 RX ADMIN — CARVEDILOL 25 MG: 25 TABLET, FILM COATED ORAL at 17:33

## 2020-01-01 RX ADMIN — HYDROCODONE BITARTRATE AND ACETAMINOPHEN 1 TABLET: 5; 325 TABLET ORAL at 02:36

## 2020-01-01 RX ADMIN — SIMVASTATIN 40 MG: 40 TABLET, FILM COATED ORAL at 20:57

## 2020-01-01 RX ADMIN — METOPROLOL TARTRATE 5 MG: 1 INJECTION, SOLUTION INTRAVENOUS at 06:44

## 2020-01-01 RX ADMIN — CEFAZOLIN 500 MG: 1 INJECTION, POWDER, FOR SOLUTION INTRAMUSCULAR; INTRAVENOUS at 15:09

## 2020-01-01 RX ADMIN — ONDANSETRON 4 MG: 2 INJECTION INTRAMUSCULAR; INTRAVENOUS at 08:24

## 2020-01-01 RX ADMIN — METOPROLOL TARTRATE 5 MG: 1 INJECTION, SOLUTION INTRAVENOUS at 14:36

## 2020-01-01 RX ADMIN — ONDANSETRON 4 MG: 2 INJECTION INTRAMUSCULAR; INTRAVENOUS at 17:42

## 2020-01-01 RX ADMIN — CEFAZOLIN 500 MG: 1 INJECTION, POWDER, FOR SOLUTION INTRAMUSCULAR; INTRAVENOUS at 02:06

## 2020-01-01 RX ADMIN — HYDRALAZINE HYDROCHLORIDE 25 MG: 20 INJECTION INTRAMUSCULAR; INTRAVENOUS at 19:28

## 2020-01-01 RX ADMIN — PANTOPRAZOLE SODIUM 40 MG: 40 TABLET, DELAYED RELEASE ORAL at 08:16

## 2020-01-01 RX ADMIN — SODIUM BICARBONATE: 84 INJECTION, SOLUTION INTRAVENOUS at 16:17

## 2020-01-01 RX ADMIN — HYDRALAZINE HYDROCHLORIDE 20 MG: 20 INJECTION INTRAMUSCULAR; INTRAVENOUS at 08:20

## 2020-01-01 RX ADMIN — SUCRALFATE 1 G: 1 TABLET ORAL at 21:53

## 2020-01-01 RX ADMIN — SUCRALFATE 1 G: 1 TABLET ORAL at 18:20

## 2020-01-01 RX ADMIN — SIMVASTATIN 40 MG: 40 TABLET, FILM COATED ORAL at 20:34

## 2020-01-01 RX ADMIN — SODIUM CHLORIDE: 9 INJECTION, SOLUTION INTRAVENOUS at 01:48

## 2020-01-01 RX ADMIN — HYDRALAZINE HYDROCHLORIDE 25 MG: 20 INJECTION INTRAMUSCULAR; INTRAVENOUS at 11:47

## 2020-01-01 RX ADMIN — CARVEDILOL 25 MG: 25 TABLET, FILM COATED ORAL at 09:30

## 2020-01-01 RX ADMIN — POTASSIUM CHLORIDE: 2 INJECTION, SOLUTION, CONCENTRATE INTRAVENOUS at 13:17

## 2020-01-01 RX ADMIN — CEFAZOLIN 500 MG: 1 INJECTION, POWDER, FOR SOLUTION INTRAMUSCULAR; INTRAVENOUS at 01:12

## 2020-01-01 RX ADMIN — HYDRALAZINE HYDROCHLORIDE 25 MG: 20 INJECTION INTRAMUSCULAR; INTRAVENOUS at 03:34

## 2020-01-01 RX ADMIN — MAGNESIUM SULFATE HEPTAHYDRATE 1 G: 1 INJECTION, SOLUTION INTRAVENOUS at 20:02

## 2020-01-01 RX ADMIN — PANTOPRAZOLE SODIUM 40 MG: 40 TABLET, DELAYED RELEASE ORAL at 09:10

## 2020-01-01 RX ADMIN — SUCRALFATE 1 G: 1 TABLET ORAL at 20:15

## 2020-01-01 RX ADMIN — POTASSIUM CHLORIDE, DEXTROSE MONOHYDRATE AND SODIUM CHLORIDE: 150; 5; 900 INJECTION, SOLUTION INTRAVENOUS at 18:49

## 2020-01-01 RX ADMIN — CARVEDILOL 25 MG: 25 TABLET, FILM COATED ORAL at 07:29

## 2020-01-01 RX ADMIN — HYDRALAZINE HYDROCHLORIDE 75 MG: 50 TABLET, FILM COATED ORAL at 01:09

## 2020-01-01 RX ADMIN — CARVEDILOL 25 MG: 25 TABLET, FILM COATED ORAL at 16:51

## 2020-01-01 RX ADMIN — SODIUM CHLORIDE 1000 ML: 9 INJECTION, SOLUTION INTRAVENOUS at 06:10

## 2020-01-01 RX ADMIN — CARVEDILOL 25 MG: 25 TABLET, FILM COATED ORAL at 08:47

## 2020-01-01 RX ADMIN — SUCRALFATE 1 G: 1 TABLET ORAL at 20:53

## 2020-01-01 RX ADMIN — CIPROFLOXACIN 400 MG: 2 INJECTION, SOLUTION INTRAVENOUS at 02:08

## 2020-01-01 RX ADMIN — HYDRALAZINE HYDROCHLORIDE 20 MG: 20 INJECTION INTRAMUSCULAR; INTRAVENOUS at 15:22

## 2020-01-01 RX ADMIN — SUCRALFATE 1 G: 1 TABLET ORAL at 12:26

## 2020-01-01 RX ADMIN — SPIRONOLACTONE 25 MG: 25 TABLET, FILM COATED ORAL at 09:27

## 2020-01-01 RX ADMIN — HYDRALAZINE HYDROCHLORIDE 75 MG: 50 TABLET, FILM COATED ORAL at 09:27

## 2020-01-01 RX ADMIN — SODIUM BICARBONATE: 84 INJECTION, SOLUTION INTRAVENOUS at 19:26

## 2020-01-01 RX ADMIN — HYDRALAZINE HYDROCHLORIDE 75 MG: 50 TABLET, FILM COATED ORAL at 21:45

## 2020-01-01 RX ADMIN — MAGNESIUM SULFATE HEPTAHYDRATE 1 G: 1 INJECTION, SOLUTION INTRAVENOUS at 18:11

## 2020-01-01 RX ADMIN — CEFAZOLIN 500 MG: 1 INJECTION, POWDER, FOR SOLUTION INTRAMUSCULAR; INTRAVENOUS at 14:14

## 2020-01-01 RX ADMIN — SPIRONOLACTONE 25 MG: 25 TABLET, FILM COATED ORAL at 17:11

## 2020-01-01 RX ADMIN — POLYETHYLENE GLYCOL 3350 17 G: 17 POWDER, FOR SOLUTION ORAL at 20:58

## 2020-01-01 RX ADMIN — HYDRALAZINE HYDROCHLORIDE 25 MG: 20 INJECTION INTRAMUSCULAR; INTRAVENOUS at 18:30

## 2020-01-01 RX ADMIN — METOPROLOL TARTRATE 5 MG: 1 INJECTION, SOLUTION INTRAVENOUS at 15:53

## 2020-01-01 RX ADMIN — HYDROCODONE BITARTRATE AND ACETAMINOPHEN 1 TABLET: 5; 325 TABLET ORAL at 10:27

## 2020-01-01 RX ADMIN — Medication 10 ML: at 07:16

## 2020-01-01 RX ADMIN — POTASSIUM CHLORIDE 10 MEQ: 7.46 INJECTION, SOLUTION INTRAVENOUS at 09:21

## 2020-01-01 RX ADMIN — SUCRALFATE 1 G: 1 TABLET ORAL at 09:10

## 2020-01-01 RX ADMIN — HYDROCODONE BITARTRATE AND ACETAMINOPHEN 1 TABLET: 5; 325 TABLET ORAL at 14:31

## 2020-01-01 RX ADMIN — SODIUM BICARBONATE: 84 INJECTION, SOLUTION INTRAVENOUS at 08:46

## 2020-01-01 RX ADMIN — SUCRALFATE 1 G: 1 TABLET ORAL at 16:51

## 2020-01-01 RX ADMIN — ENOXAPARIN SODIUM 40 MG: 40 INJECTION SUBCUTANEOUS at 07:51

## 2020-01-01 RX ADMIN — METOPROLOL TARTRATE 5 MG: 1 INJECTION, SOLUTION INTRAVENOUS at 07:52

## 2020-01-01 RX ADMIN — HYDROCODONE BITARTRATE AND ACETAMINOPHEN 2 TABLET: 5; 325 TABLET ORAL at 09:25

## 2020-01-01 RX ADMIN — SUCRALFATE 1 G: 1 TABLET ORAL at 20:35

## 2020-01-01 RX ADMIN — SPIRONOLACTONE 25 MG: 25 TABLET, FILM COATED ORAL at 08:47

## 2020-01-01 RX ADMIN — SIMVASTATIN 40 MG: 40 TABLET, FILM COATED ORAL at 20:35

## 2020-01-01 RX ADMIN — SODIUM CHLORIDE: 9 INJECTION, SOLUTION INTRAVENOUS at 14:24

## 2020-01-01 RX ADMIN — PANTOPRAZOLE SODIUM 40 MG: 40 INJECTION, POWDER, FOR SOLUTION INTRAVENOUS at 11:29

## 2020-01-01 RX ADMIN — SIMVASTATIN 40 MG: 40 TABLET, FILM COATED ORAL at 01:09

## 2020-01-01 RX ADMIN — POTASSIUM CHLORIDE: 2 INJECTION, SOLUTION, CONCENTRATE INTRAVENOUS at 16:46

## 2020-01-01 RX ADMIN — HYDRALAZINE HYDROCHLORIDE 75 MG: 50 TABLET, FILM COATED ORAL at 07:30

## 2020-01-01 RX ADMIN — CIPROFLOXACIN 400 MG: 2 INJECTION, SOLUTION INTRAVENOUS at 00:55

## 2020-01-01 RX ADMIN — SUCRALFATE 1 G: 1 TABLET ORAL at 20:34

## 2020-01-01 RX ADMIN — SUCRALFATE 1 G: 1 TABLET ORAL at 17:11

## 2020-01-01 RX ADMIN — SODIUM BICARBONATE: 84 INJECTION, SOLUTION INTRAVENOUS at 04:29

## 2020-01-01 RX ADMIN — SODIUM BICARBONATE 650 MG: 650 TABLET ORAL at 20:34

## 2020-01-01 RX ADMIN — METOPROLOL TARTRATE 5 MG: 1 INJECTION, SOLUTION INTRAVENOUS at 22:16

## 2020-01-01 RX ADMIN — MORPHINE SULFATE 1 MG: 2 INJECTION, SOLUTION INTRAMUSCULAR; INTRAVENOUS at 15:23

## 2020-01-01 RX ADMIN — CIPROFLOXACIN 400 MG: 2 INJECTION, SOLUTION INTRAVENOUS at 13:00

## 2020-01-01 RX ADMIN — HYDRALAZINE HYDROCHLORIDE 25 MG: 20 INJECTION INTRAMUSCULAR; INTRAVENOUS at 11:30

## 2020-01-01 RX ADMIN — SODIUM CHLORIDE 10 ML: 9 INJECTION, SOLUTION INTRAMUSCULAR; INTRAVENOUS; SUBCUTANEOUS at 08:20

## 2020-01-01 RX ADMIN — HYDRALAZINE HYDROCHLORIDE 25 MG: 20 INJECTION INTRAMUSCULAR; INTRAVENOUS at 03:14

## 2020-01-01 RX ADMIN — HYDRALAZINE HYDROCHLORIDE 25 MG: 20 INJECTION INTRAMUSCULAR; INTRAVENOUS at 03:31

## 2020-01-01 RX ADMIN — SODIUM CHLORIDE: 9 INJECTION, SOLUTION INTRAVENOUS at 12:59

## 2020-01-01 RX ADMIN — CIPROFLOXACIN 400 MG: 2 INJECTION, SOLUTION INTRAVENOUS at 12:26

## 2020-01-01 RX ADMIN — CARVEDILOL 25 MG: 25 TABLET, FILM COATED ORAL at 09:10

## 2020-01-01 RX ADMIN — SUCRALFATE 1 G: 1 TABLET ORAL at 08:46

## 2020-01-01 RX ADMIN — CEFAZOLIN 500 MG: 1 INJECTION, POWDER, FOR SOLUTION INTRAMUSCULAR; INTRAVENOUS at 14:24

## 2020-01-01 RX ADMIN — PANTOPRAZOLE SODIUM 40 MG: 40 INJECTION, POWDER, FOR SOLUTION INTRAVENOUS at 08:20

## 2020-01-01 RX ADMIN — SIMVASTATIN 40 MG: 40 TABLET, FILM COATED ORAL at 20:53

## 2020-01-01 RX ADMIN — SODIUM BICARBONATE 650 MG: 650 TABLET ORAL at 09:10

## 2020-01-01 RX ADMIN — HYDRALAZINE HYDROCHLORIDE 75 MG: 50 TABLET, FILM COATED ORAL at 20:15

## 2020-01-01 RX ADMIN — SIMVASTATIN 40 MG: 40 TABLET, FILM COATED ORAL at 21:46

## 2020-01-01 RX ADMIN — HYDROCODONE BITARTRATE AND ACETAMINOPHEN 1 TABLET: 5; 325 TABLET ORAL at 04:11

## 2020-01-01 RX ADMIN — METOPROLOL TARTRATE 5 MG: 1 INJECTION, SOLUTION INTRAVENOUS at 01:52

## 2020-01-01 RX ADMIN — HYDROCODONE BITARTRATE AND ACETAMINOPHEN 2 TABLET: 5; 325 TABLET ORAL at 19:47

## 2020-01-01 RX ADMIN — HYDRALAZINE HYDROCHLORIDE 20 MG: 20 INJECTION INTRAMUSCULAR; INTRAVENOUS at 16:49

## 2020-01-01 RX ADMIN — ACETAMINOPHEN 650 MG: 650 SUPPOSITORY RECTAL at 16:51

## 2020-01-01 RX ADMIN — HYDROCODONE BITARTRATE AND ACETAMINOPHEN 2 TABLET: 5; 325 TABLET ORAL at 23:19

## 2020-01-01 RX ADMIN — HYDRALAZINE HYDROCHLORIDE 25 MG: 20 INJECTION INTRAMUSCULAR; INTRAVENOUS at 03:55

## 2020-01-01 RX ADMIN — SODIUM CHLORIDE: 9 INJECTION, SOLUTION INTRAVENOUS at 02:09

## 2020-01-01 RX ADMIN — CARVEDILOL 25 MG: 25 TABLET, FILM COATED ORAL at 17:11

## 2020-01-01 RX ADMIN — CARVEDILOL 25 MG: 25 TABLET, FILM COATED ORAL at 07:51

## 2020-01-01 ASSESSMENT — PAIN DESCRIPTION - DESCRIPTORS
DESCRIPTORS: ACHING

## 2020-01-01 ASSESSMENT — PAIN SCALES - GENERAL
PAINLEVEL_OUTOF10: 0
PAINLEVEL_OUTOF10: 8
PAINLEVEL_OUTOF10: 3
PAINLEVEL_OUTOF10: 0
PAINLEVEL_OUTOF10: 5
PAINLEVEL_OUTOF10: 0
PAINLEVEL_OUTOF10: 0
PAINLEVEL_OUTOF10: 4
PAINLEVEL_OUTOF10: 0
PAINLEVEL_OUTOF10: 6
PAINLEVEL_OUTOF10: 0
PAINLEVEL_OUTOF10: 5
PAINLEVEL_OUTOF10: 0
PAINLEVEL_OUTOF10: 0
PAINLEVEL_OUTOF10: 10
PAINLEVEL_OUTOF10: 0
PAINLEVEL_OUTOF10: 0
PAINLEVEL_OUTOF10: 7
PAINLEVEL_OUTOF10: 5
PAINLEVEL_OUTOF10: 9
PAINLEVEL_OUTOF10: 0
PAINLEVEL_OUTOF10: 0
PAINLEVEL_OUTOF10: 1

## 2020-01-01 ASSESSMENT — PAIN SCALES - WONG BAKER
WONGBAKER_NUMERICALRESPONSE: 4
WONGBAKER_NUMERICALRESPONSE: 8
WONGBAKER_NUMERICALRESPONSE: 6;8

## 2020-01-01 ASSESSMENT — ENCOUNTER SYMPTOMS
EYE DISCHARGE: 0
ANAL BLEEDING: 0
BACK PAIN: 1
EYE DISCHARGE: 0
CHEST TIGHTNESS: 0
STRIDOR: 0
STRIDOR: 0
SORE THROAT: 0
ABDOMINAL PAIN: 1
EYE PAIN: 0
APNEA: 0
STRIDOR: 0
RECTAL PAIN: 0
CHEST TIGHTNESS: 0
ANAL BLEEDING: 0
EYE DISCHARGE: 0
APNEA: 0
RECTAL PAIN: 0
RECTAL PAIN: 0
EYE PAIN: 0
ANAL BLEEDING: 0
CHEST TIGHTNESS: 0
SORE THROAT: 0
SORE THROAT: 0
EYE PAIN: 0
APNEA: 0

## 2020-01-01 ASSESSMENT — PAIN DESCRIPTION - FREQUENCY
FREQUENCY: INTERMITTENT
FREQUENCY: INTERMITTENT

## 2020-01-01 ASSESSMENT — PAIN DESCRIPTION - ONSET: ONSET: ON-GOING

## 2020-01-01 ASSESSMENT — PAIN DESCRIPTION - LOCATION
LOCATION: ABDOMEN
LOCATION: HEAD;LEG
LOCATION: ABDOMEN
LOCATION: ABDOMEN
LOCATION: HIP

## 2020-01-01 ASSESSMENT — PAIN DESCRIPTION - ORIENTATION
ORIENTATION: LEFT;ANTERIOR
ORIENTATION: RIGHT

## 2020-01-01 ASSESSMENT — PAIN DESCRIPTION - PAIN TYPE
TYPE: ACUTE PAIN
TYPE: CHRONIC PAIN
TYPE: ACUTE PAIN

## 2020-01-01 ASSESSMENT — PAIN DESCRIPTION - PROGRESSION: CLINICAL_PROGRESSION: NOT CHANGED

## 2020-01-01 NOTE — PROGRESS NOTES
Pt transported from ED via bed. P oriented to room. Pt alert and oriented to person and place. No s/s of distress noted. Assessment completed. Vitals taken. Call light given. Will continue to monitor.

## 2020-01-01 NOTE — CONSULTS
Date:   1/1/2020  Patient name: Adalid Dillard  Date of admission:  12/31/2019 12:23 PM  MRN:   [id number]  YOB: 1940  PCP: Barbra Gann MD    Reason for Admission: Elevated troponin [R79.89]    Cardiology consult: Abnormal troponin       Impression  Accelerated hypertension  Normal LV function, normal wall motion, 2D echo 9/25/2019 ejection fraction more than 55%  Noncompliant for medication  History of CVA, left hemiplegia, poor mobility  History of GI bleeding  Lymphedema both lower extremities      History of presenting illness:   60-year-old female with past medical history of hypertension, CVA left hemiplegia got admitted with the elevated systolic blood pressure over 200 mmHg. Patient was sent in by her home health aide after they noticed her blood pressure  very high. On further history examination patient also complained of constant chest pain sharp in nature. She also had a chest wall tenderness  She was previously admitted on 12/4/2019 with a left upper chest and shoulder pain. 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 High-sensitivity troponin was elevated at 33 and 33.         ECG monitor sinus rhythm  Labs 1/1/2020  Sodium 140, potassium 2.8, BUN 13, creatinine 0.54, myoglobin 29, high-sensitivity troponin 61 and 65  Hemoglobin 12.3, WBC 8.8, platelets 941 MCV 82.9    2D echo 9/25/2019 normal LV size, ejection fraction more than 55%, no significant valvular abnormality    Past surgical history:   Cholecystectomy, hysterectomy, stomach surgery, appendectomy, left breast lumpectomy, mastectomy 9/12/2006, total knee arthroplasty right     Past medical history:   Hypertension, hyperlipidemia, pancreatitis, COPD, gastroesophageal reflux, GI bleeding disease, Bell's palsy, CVA left hemiplegia, DVT, IVC filter placement        Patient seen and examined  Elderly frail female she was resting with one pillow, no tachypnea  Hemodynamically stable  No clinical sign of cardiac decompensation  ECG monitor sinus rhythm heart rate 60    Medications:   Scheduled Meds:   sodium chloride flush  10 mL Intravenous 2 times per day    sucralfate  1 g Oral 4x Daily    polyethylene glycol  17 g Oral Nightly    sodium chloride flush  10 mL Intravenous 2 times per day    enoxaparin  40 mg Subcutaneous Daily    carvedilol  25 mg Oral BID WC    hydrALAZINE  75 mg Oral 2 times per day    simvastatin  40 mg Oral Nightly    pantoprazole  40 mg Oral Daily     Continuous Infusions:   sodium chloride 50 mL/hr at 01/01/20 0050     CBC:   Recent Labs     12/31/19  1235 01/01/20  0845   WBC 6.5 8.8   HGB 12.0 12.3    250     BMP:    Recent Labs     12/31/19  1235 01/01/20  0556   * 140   K 3.6* 2.8*    101   CO2 26 25   BUN 12 13   CREATININE 0.48* 0.54   GLUCOSE 89 115*     Hepatic: No results for input(s): AST, ALT, ALB, BILITOT, ALKPHOS in the last 72 hours. Troponin: No results for input(s): TROPONINI in the last 72 hours. BNP: No results for input(s): BNP in the last 72 hours. Lipids: No results for input(s): CHOL, HDL in the last 72 hours. Invalid input(s): LDLCALCU  INR: No results for input(s): INR in the last 72 hours. Objective:   Vitals: BP (!) 147/61   Pulse 56   Temp 96.5 °F (35.8 °C) (Axillary)   Resp 16   Ht 5' 2\" (1.575 m)   Wt 143 lb (64.9 kg)   SpO2 98%   BMI 26.16 kg/m²   General appearance: Elderly frail looking female  HEENT: Head: Normal, normocephalic, atraumatic.   Neck: no adenopathy, no JVD, supple, symmetrical, trachea midline and thyroid not enlarged, symmetric, no tenderness/mass/nodules  Lungs: diminished breath sounds bibasilar  Heart: regular rate and rhythm  Abdomen: soft, non-tender; bowel sounds normal; no masses,  no organomegaly  Extremities: Bilateral lymphedema noted  Neurologic: Mental status: She was awake, did not follow verbal command    EKG: normal sinus rhythm, unchanged from previous tracings. ECHO: reviewed. Ejection fraction: 55%  Stress Test: not obtained. Cardiac Angiography: not obtained. Assessment / Acute Cardiac Problems:   Patient admitted with accelerated hypertension  Constant chest pain, chest wall tenderness, does not appears in pain  Borderline abnormal high-sensitivity troponin  Patient is mostly nonverbal  Normal LV systolic function, normal wall motion 2D echo 9/25/2019  History of CVA, left hemiplegia, Bell's palsy  History of DVT, IVC filter  History of GI bleeding  Bilateral lymphedema    Patient Active Problem List:     Chronic pancreatitis ([address]     Essential hypertension     Hyperlipidemia     Psoriasis     Gastroesophageal reflux disease without esophagitis     Bell's palsy     Chronic deep vein thrombosis (DVT) of both lower extremities (HCC)     Tobacco abuse     History of alcohol abuse     Malignant neoplasm of upper-outer quadrant of left female breast (HCC)     Obesity (BMI 30.0-34. 9)     Anemia associated with acute blood loss     Hematoma (nontraumatic) of breast     Local infection of skin and subcutaneous tissue     Pseudomonas aeruginosa infection     Anorexia     Orthostatic dizziness     Hyperkalemia     Acute kidney injury ([address]     Alcohol-induced chronic pancreatitis (HCC)     Delayed surgical wound healing     Cellulitis     Anemia     Noncompliance     CKD (chronic kidney disease) stage 3, GFR 30-59 ml/min (HCC)     Iron deficiency anemia     TIA (transient ischemic attack)     Generalized abdominal pain     Adnexal mass     Bilateral carotid artery stenosis     Impaired gait     GI bleed     Acute deep vein thrombosis (DVT) of distal vein of lower extremity (HCC)     Occult blood positive stool     Moderate obesity     Gastroesophageal reflux disease     Mild malnutrition (HCC)     Chest pain     Elevated troponin     Hypertensive urgency     Hypokalemia      Plan of Treatment:   Replace potassium  Continue with current dose of beta-blocker, hydralazine, statin  Twelve-lead ECG    Thank you for letting me to participate in health care of your patient    Electronically signed by Connor Betancourt MD on 1/1/2020 at 2:56 PM

## 2020-01-01 NOTE — FLOWSHEET NOTE
[facility] was very seemed sleepy but seemed glad for my visit and words. Chaplains remain available for spiritual or emotional support as needed.       01/01/20 1318   Encounter Summary   Services provided to: Patient   Referral/Consult From: [facility] Unknown   Continue Visiting   (1/1/2020)   Complexity of Encounter Moderate   Length of Encounter 15 minutes   Routine   Type Initial   Assessment Sad   Intervention Nurtured hope;[place]   Outcome Comfort

## 2020-01-01 NOTE — H&P
History and Physical      Name: Jhony Dillard  MRN: [id number]     Acct: [de-identified]  Room: 2094/2094-01    Admit Date: 12/31/2019  PCP: Tiny Ramirez MD      Chief Complaint:     Chief Complaint   Patient presents with    Hypertension       History Obtained From:     patient, electronic medical record    History of Present Illness:      Richie Coombs is a  [de-identified] y.o.  female who presents with Hypertension   patient presented to ER due to elevated blood pressure patient also having abdominal pain patient does have chronic chest pain and leg pain does have history of stroke. With left-sided weakness  Location: generalized  Quality: heaviness  Radiation:without radiation  Onset: gradual  Duration: 2 day(s)  Time: intermittent (1-10 minutes)    Intensity: moderate and decreasing   Aggravating Factors:none  Alleviating Factors: none  Pertinent History:      Past Medical History:     Past Medical History:   Diagnosis Date    Abnormal kidney function     PT. RELATES UNAWARE OF.  Acid reflux disease     Anesthesia     'needs to be asleep when ET tube removed due  to severe gastric reflux will make me have hard time breathing. \"     Arthritis     Bell's palsy     [facility] [address]     Breast, lt.  Cerebral artery occlusion with cerebral infarction [facility])     Cerebrovascular disease     pt denies stroke she says she had bells palsy    COPD (chronic obstructive pulmonary disease) ([facility]     patient is unaware    Deep vein thrombosis (DVT) ([facility]     multiple    Edema     RT. LEG.  History of cancer of left breast     Hx of blood clots     dvt scott. legs.     Hyperlipidemia     Hypertension     Pancreatitis     Wears dentures     UPPER    Wears glasses         Past Surgical History:     Past Surgical History:   Procedure Laterality Date    APPENDECTOMY      BREAST LUMPECTOMY Left 07/14/2016    with sentinel lymph node dissection    CHOLECYSTECTOMY  2011    COLONOSCOPY N/A 8/1/2019 98 - 107 mmol/L    CO2 25 20 - 31 mmol/L    Anion Gap 14 9 - 17 mmol/L    GFR Non-African American >60 >60 mL/min    GFR African American >60 >60 mL/min    GFR Comment          GFR Staging NOT REPORTED    SPECIMEN REJECTION    Collection Time: 01/01/20  5:56 AM   Result Value Ref Range    Specimen Source . BLOOD     Ordered Test CDP     Reason for Rejection Unable to perform testing: Specimen clotted.      - NOT REPORTED    Magnesium    Collection Time: 01/01/20  5:56 AM   Result Value Ref Range    Magnesium 1.5 (L) 1.6 - 2.6 mg/dL   CBC Auto Differential    Collection Time: 01/01/20  8:45 AM   Result Value Ref Range    WBC 8.8 3.5 - 11.0 k/uL    RBC 5.13 4.0 - 5.2 m/uL    Hemoglobin 12.3 12.0 - 16.0 g/dL    Hematocrit 39.3 36 - 46 %    MCV 76.6 (L) 80 - 100 fL    MCH 24.0 (L) 26 - 34 pg    MCHC 31.3 31 - 37 g/dL    RDW 19.1 (H) 11.5 - 14.9 %    Platelets 707 423 - 203 k/uL    MPV 8.8 6.0 - 12.0 fL    NRBC Automated NOT REPORTED per 100 WBC    Differential Type NOT REPORTED     Immature Granulocytes NOT REPORTED 0 %    Absolute Immature Granulocyte NOT REPORTED 0.00 - 0.30 k/uL    WBC Morphology NOT REPORTED     RBC Morphology NOT REPORTED     Platelet Estimate NOT REPORTED     Seg Neutrophils 75 (H) 36 - 66 %    Lymphocytes 15 (L) 24 - 44 %    Monocytes 6 1 - 7 %    Eosinophils % 3 0 - 4 %    Basophils 1 0 - 2 %    Segs Absolute 6.70 1.3 - 9.1 k/uL    Absolute Lymph # 1.30 1.0 - 4.8 k/uL    Absolute Mono # 0.50 0.1 - 1.3 k/uL    Absolute Eos # 0.20 0.0 - 0.4 k/uL    Basophils Absolute 0.10 0.0 - 0.2 k/uL       All plain film images(s) ,CT, Ultrasound and MRI have been read by the radiologist.  No orders to display       Assesment:     Primary Problem  Hypertensive urgency    Principal Problem:    Hypertensive urgency  Active Problems:    Essential hypertension    Gastroesophageal reflux disease without esophagitis    Chronic deep vein thrombosis (DVT) of both lower extremities (HCC)    CKD (chronic kidney disease) stage 3, GFR 30-59 ml/min ([facility])    Chest pain    Elevated troponin    Hypokalemia  Resolved Problems:    * No resolved hospital problems. *      Plan:     1. IV hydralazine for blood pressure control  2.  consult Cardiology for elevated troponin and uncontrolled hypertension  3. Restart home medications  4. DVT Prophylaxis Lovenox  5. IV Fluids  6. Pain Control  7.  Nausea medication  8.  check and replace electrolytes per sliding scale        Electronically signed by Loren Marquez MD     Copy sent to Dr. Andriy Choi MD

## 2020-01-01 NOTE — PLAN OF CARE
Safety maintained, call light is within reach, no s/s or c/o distress, bed is low/locked, side rails are up x2, bed alarm remains on, tele-sitter remains in room, current skin integrity maintained  Problem: Falls - Risk of:  Goal: Will remain free from falls  Description  Will remain free from falls  Outcome: Ongoing  Goal: Absence of physical injury  Description  Absence of physical injury  Outcome: Ongoing     Problem: Risk for Impaired Skin Integrity  Goal: Tissue integrity - skin and mucous membranes  Description  Structural intactness and normal physiological function of skin and  mucous membranes.   Outcome: Ongoing

## 2020-01-02 NOTE — FLOWSHEET NOTE
01/02/20 1447   Encounter Summary   Services provided to: Patient   Referral/Consult From: Palliative Care   Complexity of Encounter Low   Length of Encounter 15 minutes   Spiritual/Pentecostalism   Type Spiritual support   Assessment Sleeping   Intervention Prayer   Outcome Did not respond

## 2020-01-02 NOTE — PROGRESS NOTES
Date:   1/2/2020  Patient name: Carmen Dillard  Date of admission:  12/31/2019 12:23 PM  MRN:   [id number]  YOB: 1940  PCP: Rafia Treviño MD    Reason for Admission: Elevated troponin [R79.89]    Cardiology consult: Abnormal troponin     Impression  Accelerated hypertension  Normal LV function, normal wall motion, 2D echo 9/25/2019 ejection fraction more than 55%  Noncompliant for medication  History of CVA, left hemiplegia, poor mobility  History of GI bleeding  Lymphedema both lower extremities     History of presenting illness:   79-year-old female with past medical history of hypertension, CVA left hemiplegia got admitted with the elevated systolic blood pressure over 200 mmHg. Patient was sent in by her home health aide after they noticed her blood pressure  very high. On further history examination patient also complained of constant chest pain sharp in nature. She also had a chest wall tenderness  She was previously admitted on 12/4/2019 with a left upper chest and shoulder pain. 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 High-sensitivity troponin was elevated at 33 and 33.     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, no change from ECG obtained on 12/4/2019  Labs 1/1/2020  Sodium 140, potassium 2.8, BUN 13, creatinine 0.54, myoglobin 29, high-sensitivity troponin 61 and 65  Hemoglobin 12.3, WBC 8.8, platelets 601 MCV 82.2     2D echo 9/25/2019 normal LV size, ejection fraction more than 55%, no significant valvular abnormality     Past surgical history:   Cholecystectomy, hysterectomy, stomach surgery, appendectomy, left breast lumpectomy, mastectomy 9/12/2006, total knee arthroplasty right     Past medical history:   Hypertension, hyperlipidemia, pancreatitis, COPD, gastroesophageal reflux, GI bleeding disease, Bell's palsy, CVA left hemiplegia, DVT, IVC filter placement      1/2/2020 sodium 141, potassium 3.8, BUN 14, tenderness/mass/nodules  Lungs: diminished breath sounds bibasilar  Heart: regular rate and rhythm  Abdomen: soft, non-tender; bowel sounds normal; no masses,  no organomegaly  Extremities: Bilateral lymphedema  Neurologic: Mental status: Responding to verbal command very well    EKG: normal sinus rhythm. Probable old inferior MI  ECHO: reviewed. Ejection fraction: 55%  Stress Test: not obtained. Cardiac Angiography: not obtained. Assessment / Acute Cardiac Problems:     Patient admitted with accelerated hypertension  Constant chest pain, chest wall tenderness, does not appears in pain  Borderline abnormal high-sensitivity troponin  Patient is mostly nonverbal  Normal LV systolic function, normal wall motion 2D echo 9/25/2019  History of CVA, left hemiplegia, Bell's palsy  History of DVT, IVC filter  History of GI bleeding  Bilateral lymphedema    January 2, 2020 patient is hemodynamically stable, no sign of cardiac decompensation, systolic blood pressure 307/22, serum potassium normal      Patient Active Problem List:     Chronic pancreatitis ([address]     Essential hypertension     Hyperlipidemia     Psoriasis     Gastroesophageal reflux disease without esophagitis     Bell's palsy     Chronic deep vein thrombosis (DVT) of both lower extremities (HCC)     Tobacco abuse     History of alcohol abuse     Malignant neoplasm of upper-outer quadrant of left female breast ([address]     Obesity (BMI 30.0-34. 9)     Anemia associated with acute blood loss     Hematoma (nontraumatic) of breast     Local infection of skin and subcutaneous tissue     Pseudomonas aeruginosa infection     Anorexia     Orthostatic dizziness     Hyperkalemia     Acute kidney injury ([address]     Alcohol-induced chronic pancreatitis (HCC)     Delayed surgical wound healing     Cellulitis     Anemia     Noncompliance     CKD (chronic kidney disease) stage 3, GFR 30-59 ml/min (HCC)     Iron deficiency anemia     TIA (transient ischemic attack) Generalized abdominal pain     Adnexal mass     Bilateral carotid artery stenosis     Impaired gait     GI bleed     Acute deep vein thrombosis (DVT) of distal vein of lower extremity (HCC)     Occult blood positive stool     Moderate obesity     Gastroesophageal reflux disease     Mild malnutrition (HCC)     Chest pain     Elevated troponin     Hypertensive urgency     Hypokalemia      Plan of Treatment:   Add Aldactone 25 mg once a day  Ambulate as tolerated  May be discharged to rehab unit on current medication    Electronically signed by Vero Donohue MD on 1/2/2020 at 1:19 PM

## 2020-01-02 NOTE — CONSULTS
..    Palliative Care Inpatient Consult    NAME:  Jonathon Dillard  MEDICAL RECORD NUMBER:  [id number]  AGE: [de-identified] y.o. GENDER: female  : 1940  TODAY'S DATE:  2020    Reasons for Consultation:    Provision of information regarding PC and/or hospice philosophies  Complex, time-intensive communication and interdisciplinary psychosocial support  Clarification of goals of care and/or assistance with difficult decision-making  Guidance in regards to resources and transition(s)    Members of PC team contributing to this consultation are :  Tonya Miels R.N. History of Present Illness     The patient is a [de-identified] y.o. Non-/non  female who presents with Hypertension      Referred to Palliative Care by   [x] Physician   [] Nursing  [] Family Request   [] Other:       She was admitted to the primary service for Elevated troponin [R79.89]. Her hospital course has been associated with Hypertensive urgency. The patient has a complicated medical history and has been hospitalized since 2019 12:23 PM.    Active Hospital Problems    Diagnosis Date Noted    Hypertensive urgency [I16.0] 2020    Hypokalemia [E87.6] 2020    Elevated troponin [R79.89] 2019    Chest pain [R07.9] 2019    CKD (chronic kidney disease) stage 3, GFR 30-59 ml/min (HCC) [N18.3] 2017    Chronic deep vein thrombosis (DVT) of both lower extremities (HCC) [I82.503] 06/10/2016    Essential hypertension [I10] 2016    Gastroesophageal reflux disease without esophagitis [K21.9] 2016       PAST MEDICAL HISTORY      Diagnosis Date    Abnormal kidney function     PT. RELATES UNAWARE OF.  Acid reflux disease     Anesthesia     'needs to be asleep when ET tube removed due  to severe gastric reflux will make me have hard time breathing. \"     Arthritis     Bell's palsy     [facility] [address] [address]     Breast, lt.     Cerebral artery occlusion with cerebral infarction [facility])     Cerebrovascular disease asked     Personal, Social, and Family History  Marital Status:   Living situation:with family:  daughter  Felicita/Spiritual History:     Psychological Distress: Unknown  Does patient understand diagnosis/treatment? no  Does family/caregiver understand diagnosis/treatment? yes      Assessment      Palliative Performance Scale:  ___60%  Ambulation reduced; Significant disease; Can't do hobbies/housework; intake normal or reduced; occasional assist; LOC full/confusion  ___50%  Mainly sit/lie; Extensive disease; Can't do any work; Considerable assist; intake normal or reduced; LOC full/confusion  ___40%  Mainly in bed; Extensive disease; Mainly assist; intake normal or reduced; LOC full/confusion   ___30%  Bed Bound; Extensive disease; Total care; intake reduced; LOCfull/confusion  ___20%  Bed Bound; Extensive disease; Total care; intake minimal; Drowsy/coma  ___10%  Bed Bound; Extensive disease; Total care; Mouth care only; Drowsy/coma  ___0       Death      Plan      Palliative Interaction:Patient was sleeping and telesitter was on at the bedside. I reach out son Ion Valentin as noted on the Facesheet at [phone] and there was no name on the VM. Ion Valentin then later returned my call from [phone]. I introduced my palliative care role to him , and he had his wife Myla Nicholas talk with me. I introduced my palliative care role to Last. Mylalior Nicholas tells me that the patient lives with daughter Josh Spann, and that her and Ion Valentin just moved here to help care for the patient . GOALS OF CARE:   Mylalior Nicholas tells me that the patient cannot get around because of her stroke,and at home and needs 24 hour care and cannot always respond verbally. Myla Nicholas tells me that the patient came into the hospital with high BP, and a UTI and elevated cardiac enzymes. She wanted a medical update from today and I told her that I would have the bedside nurse call her.    I talked again about my support role, and the goal of palliative was quality of PM    Palliative care office: [phone]

## 2020-01-02 NOTE — PROGRESS NOTES
Updated Ashley Sellers about their mother via phone. Dr. Angela Molina to round this evening. Will notify Franky if the patient does in fact discharge tonight per Dr. Angela Molina.

## 2020-01-02 NOTE — FLOWSHEET NOTE
01/01/20 4260   Provider Notification   Reason for Communication Evaluate   Provider Name Dr. Ld Borja   Provider Notification Physician   Method of Communication Call   Response See orders   Notification Time [phone]   RN contacted physician regarding UA results, moderate bacteria and large leukocytes. Order given for IV Cipro Q12.

## 2020-01-02 NOTE — PROGRESS NOTES
Talked with Dr. Michael Sharif about already completing an EKG this am per ER physician. Ok to discontinue the EKG that was ordered at 1345 per T.O. Dr. Michael Sharif.

## 2020-01-02 NOTE — FLOWSHEET NOTE
01/02/20 1808   Encounter Summary   Services provided to: Patient   Referral/Consult From: Palliative Care   Complexity of Encounter Low   Length of Encounter 15 minutes   Spiritual/Islam   Type Spiritual support   Assessment Sleeping   Intervention Prayer;Provided reading materials/devotional materials   Outcome Did not respond

## 2020-01-02 NOTE — PLAN OF CARE
Problem: Falls - Risk of:  Goal: Will remain free from falls  Description  Will remain free from falls  1/2/2020 0358 by Matt Perez RN  Outcome: Ongoing     Problem: Falls - Risk of:  Goal: Absence of physical injury  Description  Absence of physical injury  1/2/2020 0358 by Matt Perez RN  Outcome: Ongoing     Problem: Risk for Impaired Skin Integrity  Goal: Tissue integrity - skin and mucous membranes  Description  Structural intactness and normal physiological function of skin and  mucous membranes.   1/2/2020 0358 by Matt Perez RN  Outcome: Ongoing

## 2020-01-02 NOTE — CARE COORDINATION
ONGOING DISCHARGE PLAN:    Spoke with patient regarding discharge plan and patient states she is going home with [facility]. States her family takes care of her. Patient is current with [facility] at home. Tonya Palliative Care RN to talk with Family tomorrow. Remains on Iv Cipro, IV fluids,     Will continue to follow for additional discharge needs.     Electronically signed by Mely Godoy RN on 1/2/2020 at 4:19 PM

## 2020-01-03 NOTE — PROGRESS NOTES
Repositioned  Response to Pain Intervention: Patient Satisfied  Multiple Pain Sites: No  Vital Signs  Patient Currently in Pain: Yes       Orientation  Orientation  Overall Orientation Status: Impaired  Orientation Level: Oriented to person;Disoriented to place; Disoriented to time;Disoriented to situation  Social/Functional History  Social/Functional History  Lives With: Daughter  Type of Home: House  Home Layout: One level  Home Access: Stairs to enter with rails  Entrance Stairs - Number of Steps: 5  Entrance Stairs - Rails: Left  Bathroom Shower/Tub: Tub/Shower unit, Curtain  Bathroom Toilet: Standard  Bathroom Equipment: Commode  Bathroom Accessibility: Walker accessible  Home Equipment: Rolling walker, [address] 450 bed  Receives Help From: Family, Home health(Mumtazdrake works as a Nurses Aide 2nd/3rd shifts ))  ADL Assistance: Needs assistance  Homemaking Assistance: Needs assistance  Homemaking Responsibilities: No  Ambulation Assistance: (nonambulatory- flaccid right side from CVA)  Transfer Assistance: Needs assistance((Has been attempting to get a patient lift)  Active : No  Occupation: Retired  Additional Comments: pt is a poor historian, no family present to provide information; above information taken from PT evaluation dated 12-6-19. Pt is dependent for all care- dtr is primary caregiver for her mother.   Cognition        Objective     Observation/Palpation  Observation: peripheral IV right hand, external urinary catheter, telesitter    AROM RLE (degrees)  RLE AROM: WFL  RLE General AROM: Lymphedema noted  PROM LLE (degrees)  LLE General PROM:   Impaired Knee ROM due to pain, Knee flexion tolerated to 15-  50 degrees, DF to -10 degrees, very sensitive  AROM LLE (degrees)  LLE General AROM:  hx cerebral infarct w/ left hemiplegia, Pt unable to move her left leg actively; drop foot left  AROM RUE (degrees)  RUE General AROM: See OT for UE assessment  AROM LUE (degrees)  LUE General AROM: See OT for UE assessment-  hx cerebral infarct w/ left hemiplegia,   Strength RLE  Comment: Hip flexors 3/5 otherwise 3+/5  Strength LLE  Comment:  hx cerebral infarct w/ left hemiplegia, Trace muscle contraction felt at Hip joint, no contraction felt at quads, unable to wiggle toes, or move foot on her own- drop foot left. Strength RUE  Comment: See OT for UE assessment  Strength LUE  Comment: See OT for UE assessment-  hx cerebral infarct w/ left hemiplegia,      Sensation  Overall Sensation Status: WFL(denies)  Bed mobility  Rolling to Left: Dependent/Total;2 Person assistance  Rolling to Right: Dependent/Total;2 Person assistance  Comment: deferred dangling at the EOB- pt not assisting to roll; therapist assisted nurse Gigi Manuel to reposition into left side lying position w/ pillow support at the end of treatment  Transfers  Comment: dependent for all transfers- family interested in lift at home per notes  Ambulation  Ambulation?: (nonambulatory)              Plan   Plan  Times per week: 3-5  treatments/ week  Times per day: (3-5  treatments/ week)  Specific instructions for Next Treatment: 1-3-2020 bedside eval completed, dep x 2 to roll either direction, FALL RISK- hx CVA w/ left hemiparesis, nonambulatory  Current Treatment Recommendations: Strengthening, ROM, Positioning, Patient/Caregiver Education & Training, Safety Education & Training  Safety Devices  Type of devices:  All fall risk precautions in place, Bed alarm in place, Call light within reach, Left in bed, Nurse notified, Patient at risk for falls(nurse Gigi Manuel)    G-Code       OutComes Score                                                  AM-PAC Score     AM-PAC Inpatient Mobility without Stair Climbing Raw Score : 5 (01/03/20 0854)  AM-PAC Inpatient without Stair Climbing T-Scale Score : 23.59 (01/03/20 0854)  Mobility Inpatient CMS 0-100% Score: 100 (01/03/20 0854)  Mobility Inpatient without Stair CMS G-Code Modifier : CN (01/03/20 0854)

## 2020-01-03 NOTE — CARE COORDINATION
ONGOING DISCHARGE PLAN:    Spoke with patient's Daughter, Kandis Shook regarding discharge plan and she confirms that plan is to have her Mom DC to [facility] for rehab. She has been there before. Notified, Son Landaverde of this & he is following. PT/OT on board, will follow rec. If Pt does not qualify for Rehab, she will return to home w/ her w/ VNS, [facility]. Judith informed writer that they never received Lift from [employer], when pt. Was DC to home in December. Writer did speak to Gopi Villarreal, from [employer], in regards to this & she informed writer that the company was attempting to get in touch w/ Judith for a few days without success, so order was cancelled. Writer informed Gopi Villarreal, that Kandis Shook is still interested in lift & can be reached at [phone] & that she does work nights. Will continue to follow for additional discharge needs.     Electronically signed by Damaris Love RN on 1/3/2020 at 2:43 PM

## 2020-01-03 NOTE — PROGRESS NOTES
[address] [facility]    Occupational Therapy Evaluation  Date: 1/3/20  Patient Name: Bebeto Dillard       Room: 5737/7479-49  MRN: [id number]  Account: [de-identified]   : 1940  ([de-identified] y.o.) Gender: female     Discharge Recommendations:  Further Occupational  Therapy is recommended upon facility discharge. OT Equipment Recommendations  Equipment Needed: Yes  Mobility Devices: Transport Devices  Transport Devices: Patient Mechanical Lift    Referring Practitioner: Dr Christiano Dash   Diagnosis: Hypertensive Urgency   Additional Pertinent Hx: Multiple admissions     Treatment Diagnosis: Impaired ability to participate in care tasks   Past Medical History:  has a past medical history of Abnormal kidney function, Acid reflux disease, Anesthesia, Arthritis, Bell's palsy, Cancer ([facility] [address] Cerebral artery occlusion with cerebral infarction ([facility] [address] Cerebrovascular disease, COPD (chronic obstructive pulmonary disease) ([facility] [address] Deep vein thrombosis (DVT) ([facility] [address] Edema, History of cancer of left breast, Hx of blood clots, Hyperlipidemia, Hypertension, Pancreatitis, Wears dentures, and Wears glasses. Past Surgical History:   has a past surgical history that includes Cholecystectomy (); Hysterectomy; Stomach surgery; Total knee arthroplasty (Right); Tubal ligation; Appendectomy; Breast lumpectomy (Left, 2016); pr sono guide needle biopsy (2016); Mastectomy (Left, 2016); Upper gastrointestinal endoscopy (N/A, 2019); and Colonoscopy (N/A, 2019).     Restrictions  Restrictions/Precautions: Fall Risk  Implants present? : Metal implants  Other position/activity restrictions:  NO BP OR LAB DRAWS LEFT ARM due to left breast mastectomy, up as tolerated, pureed diet  Required Braces or Orthoses?: No     Vitals  Temp: 97.4 °F (36.3 °C)  Pulse: 62  Resp: 18  BP: (!) 134/53  Height: 5' 2\" (157.5 cm)  Weight: 143 lb (64.9 kg)  BMI (Calculated): 26.2  Oxygen Therapy  SpO2: 96 %  Pulse Oximeter Device Mode: Intermittent  Pulse Oximeter Device Location: Finger  O2 Device: None (Room air)  Level of Consciousness: Alert    Subjective  Subjective: Rouses to contact  Overall Orientation Status: Impaired  Vision  Vision: Impaired  Vision Exceptions: Wears glasses at all times  Hearing  Hearing: Within functional limits  Social/Functional History  Lives With: Daughter(Daughter works 2nd/3rd shift  )  Type of Home: House  Home Layout: One level  Home Access: Stairs to enter with rails  Entrance Stairs - Number of Steps: 5   Family exploring about obtaining a ramp   Entrance Stairs - Rails: Left  Bathroom Shower/Tub: Tub/Shower unit, Curtain(Sponge Bathes )  Bathroom Toilet: Standard  Bathroom Equipment: Commode  Bathroom Accessibility: Walker accessible  Home Equipment: Rolling walker, [address] 450 bed(Family attemtping to get a patient handling device / lift )  Receives Help From: Family, Home health  ADL Assistance: Needs assistance  Homemaking Assistance: Needs assistance  Homemaking Responsibilities: No  Ambulation Assistance: Needs assistance(Non-ambulatory )  Transfer Assistance: Needs assistance  Active : No  Patient's  Info: Daughter  Occupation: Retired  Additional Comments: pt is a poor historian, no family present to verify information; information from prior admissions. Pt is dependent for all care- dtr is primary caregiver for her mother. Objective      Cognition  Overall Cognitive Status: Exceptions       ADL  Feeding: Moderate assistance, Increased time to complete  Grooming: Moderate assistance  UE Bathing: Moderate assistance  LE Bathing: Dependent/Total  UE Dressing:  Moderate assistance  LE Dressing: Dependent/Total  Toileting: Dependent/Total    UE Function           LUE Strength  L Hand General: 4-/5     LUE Tone: Hypertonic  LUE PROM (degrees)  LUE General PROM: Impaired      Left Hand PROM (degrees)  Left Hand PROM: [facility]     RUE Strength  R Hand General: 4/5      RUE Tone: Normotonic     RUE AROM (degrees)  RUE AROM : WFL     Right Hand AROM (degrees)  Right Hand AROM: WFL    Fine Motor Skills  Coordination  Movements Are Fluid And Coordinated: No                           Mobility                   Bed mobility  Rolling to Left: Dependent/Total  Rolling to Right: Dependent/Total            Assessment  Performance deficits / Impairments: Decreased functional mobility , Decreased ADL status, Decreased strength, Decreased ROM, Decreased safe awareness, Decreased cognition, Decreased endurance, Decreased fine motor control  Treatment Diagnosis: Impaired ability to participate in care tasks   Prognosis: Fair, Good  Decision Making: Medium Complexity  History: Moderate Chart Review   Exam: 8 areas of altered performance   REQUIRES OT FOLLOW UP: Yes  Discharge Recommendations: Patient would benefit from continued therapy after discharge  Activity Tolerance: Patient limited by fatigue, Treatment limited secondary to decreased cognition         Functional Outcome Measures  AM-PAC Daily Activity Inpatient   How much help for putting on and taking off regular lower body clothing?: Total  How much help for Bathing?: A Lot  How much help for Toileting?: Total  How much help for putting on and taking off regular upper body clothing?: A Lot  How much help for taking care of personal grooming?: A Lot  How much help for eating meals?: A Lot  [facility] Inpatient Daily Activity Raw Score: 10  AM-PAC Inpatient ADL T-Scale Score : 27.31  ADL Inpatient CMS 0-100% Score: 74.7  ADL Inpatient CMS G-Code Modifier : CL       Goals  Patient Goals   Patient goals : Return Home   Short term goals  Time Frame for Short term goals: 1-3 days   Short term goal 1: Pt will complete bed mobility with Mod A x2 and tolerate sitting EOB for 5-10 minutes with SBA while participating in a functional task. Short term goal 2:  Pt will actively participate in self-care routine and complete tasks at Max level of IND.   Short term goal 3:  Pt will actively participate in 15-20 minutes of therapeutic exercise/activity to promote increased use of UE's in  self-care and mobility.     Plan  Safety Devices  Safety Devices in place: Yes  Type of devices: Patient at risk for falls, Call light within reach     Plan  Times per week: 1-4 sessions   Times per day: Daily  Current Treatment Recommendations: ROM, Endurance Training, Cognitive Reorientation, Neuromuscular Re-education, Patient/Caregiver Education & Training, Safety Education & Training, Self-Care / ADL, Positioning  OT Education  OT Education: OT Role, Plan of Care, ADL Adaptive Strategies, Precautions, Orientation    OT Equipment Recommendations  Equipment Needed: Yes  Mobility Devices: Transport Devices  Transport Devices: Patient Mechanical Lift  OT Individual Minutes  Time In: 2843  Time Out: [address]  Minutes: 20    Electronically signed by Tamar Ferraro OT on 1/3/20 at 4:09 PM

## 2020-01-03 NOTE — CARE COORDINATION
ISABELA received info that this patient 's daughter would like for the patient to go go [facility] if her insurance will allow it. ISABELA faxed the referral and followed up with Cesar Snell in admissions. ISABELA asked that they start pre-cert ASAP. SW following    ADD:  ISABELA spoke to Cesar Snell in admissions with [facility] again and she reported that they did start pre-cert on this date. SW following.

## 2020-01-03 NOTE — PROGRESS NOTES
Date:   1/3/2020  Patient name: Herlinda Dillard  Date of admission:  12/31/2019 12:23 PM  MRN:   [id number]  YOB: 1940  PCP: Oneida Basurto MD    Reason for Admission: Elevated troponin [R79.89]  Elevated troponin [R79.89]    Cardiology consult: Abnormal troponin       Impression  Accelerated hypertension  Normal LV function, normal wall motion, 2D echo 9/25/2019 ejection fraction more than 55%  Noncompliant for medication  History of CVA, left hemiplegia, poor mobility  History of GI bleeding  Lymphedema both lower extremities     History of presenting illness:   67-year-old female with past medical history of hypertension, CVA left hemiplegia got admitted with the elevated systolic blood pressure over 200 mmHg.  Patient was sent in by her home health aide after they noticed her blood pressure  very high.  On further history examination patient also complained of constant chest pain sharp in nature.  She also had a chest wall tenderness  She was previously admitted on 12/4/2019 with a left upper chest and shoulder pain.  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  High-sensitivity troponin was elevated at 33 and 33.     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, no change from ECG obtained on 12/4/2019  Labs 1/1/2020  Sodium 140, potassium 2.8, BUN 13, creatinine 0.54, myoglobin 29, high-sensitivity troponin 61 and 65  Hemoglobin 12.3, WBC 8.8, platelets 848 MCV 98.1     2D echo 9/25/2019 normal LV size, ejection fraction more than 55%, no significant valvular abnormality     Past surgical history:   Cholecystectomy, hysterectomy, stomach surgery, appendectomy, left breast lumpectomy, mastectomy 9/12/2006, total knee arthroplasty right     Past medical history:   Hypertension, hyperlipidemia, pancreatitis, COPD, gastroesophageal reflux, GI bleeding disease, Bell's palsy, CVA left hemiplegia, DVT, IVC filter placement     Patient seen and tenderness/mass/nodules  Lungs: diminished breath sounds bibasilar  Heart: regular rate and rhythm  Abdomen: soft, non-tender; bowel sounds normal; no masses,  no organomegaly  Extremities: Bilateral lymphedema  Neurologic: Mental status: Responding to verbal command very well    EKG: normal sinus rhythm. Probable old inferior MI  ECHO: reviewed. Ejection fraction: 55%  Stress Test: not obtained. Cardiac Angiography: not obtained. Assessment / Acute Cardiac Problems:   Patient admitted with accelerated hypertension  Constant chest pain, chest wall tenderness, does not appears in pain  Borderline abnormal high-sensitivity troponin  Patient is mostly nonverbal  Normal LV systolic function, normal wall motion 2D echo 9/25/2019  History of CVA, left hemiplegia, Bell's palsy  History of DVT, IVC filter  History of GI bleeding  Bilateral lymphedema  UTI     January 2, 2020 patient is hemodynamically stable, no sign of cardiac decompensation, systolic blood pressure 408/16, serum potassium normal    Patient Active Problem List:     Chronic pancreatitis ([address]     Essential hypertension     Hyperlipidemia     Psoriasis     Gastroesophageal reflux disease without esophagitis     Bell's palsy     Chronic deep vein thrombosis (DVT) of both lower extremities (HCC)     Tobacco abuse     History of alcohol abuse     Malignant neoplasm of upper-outer quadrant of left female breast ([address]     Obesity (BMI 30.0-34. 9)     Anemia associated with acute blood loss     Hematoma (nontraumatic) of breast     Local infection of skin and subcutaneous tissue     Pseudomonas aeruginosa infection     Anorexia     Orthostatic dizziness     Hyperkalemia     Acute kidney injury ([address]     Alcohol-induced chronic pancreatitis (HCC)     Delayed surgical wound healing     Cellulitis     Anemia     Noncompliance     CKD (chronic kidney disease) stage 3, GFR 30-59 ml/min (HCC)     Iron deficiency anemia     TIA (transient ischemic attack) Generalized abdominal pain     Adnexal mass     Bilateral carotid artery stenosis     Impaired gait     GI bleed     Acute deep vein thrombosis (DVT) of distal vein of lower extremity (HCC)     Occult blood positive stool     Moderate obesity     Gastroesophageal reflux disease     Mild malnutrition (HCC)     Chest pain     Elevated troponin     Hypertensive urgency     Hypokalemia      Plan of Treatment:   Continue with current dose of beta-blocker, spironolactone, hydralazine and simvastatin  Ambulate as tolerated  Okay to discharge from cardiac point of view    Electronically signed by Lisette Nguyễn MD on 1/3/2020 at 4:09 PM

## 2020-01-03 NOTE — PLAN OF CARE
Problem: Falls - Risk of:  Goal: Will remain free from falls  Description  Will remain free from falls  Outcome: Ongoing   No falls this shift. Problem: Falls - Risk of:  Goal: Absence of physical injury  Description  Absence of physical injury  Outcome: Ongoing   No injury this shift. Problem: Risk for Impaired Skin Integrity  Goal: Tissue integrity - skin and mucous membranes  Description  Structural intactness and normal physiological function of skin and  mucous membranes. Outcome: Ongoing   No new altered skin this shift.

## 2020-01-03 NOTE — FLOWSHEET NOTE
[facility]     01/03/20 1224   Encounter Summary   Services provided to: Patient   Referral/Consult From: Palliative Care   Complexity of Encounter Low   Length of Encounter 15 minutes   Spiritual/Orthodoxy   Type Spiritual support   Assessment Sleeping   Intervention Prayer   Outcome Did not respond

## 2020-01-04 PROBLEM — N30.00 ACUTE CYSTITIS WITHOUT HEMATURIA: Status: ACTIVE | Noted: 2020-01-01

## 2020-01-04 NOTE — FLOWSHEET NOTE
01/04/20 1109   Encounter Summary   Services provided to: Patient   Referral/Consult From: Palliative Care   Continue Visiting   (1/4/20)   Complexity of Encounter Low   Length of Encounter 15 minutes   Spiritual Assessment Completed Yes   Spiritual/Jehovah's witness   Type Spiritual support   Assessment Calm; Approachable   Intervention Prayer;Sustaining presence/ Ministry of presence   Outcome Receptive; Expressed gratitude

## 2020-01-04 NOTE — PROGRESS NOTES
Hospitalist Progress Note  1/3/2020 10:16 PM  Subjective:   Admit Date: 12/31/2019  PCP: Susan King MD     Full Code      C/c:  Chief Complaint   Patient presents with    Hypertension         Interval History: pt continues to hv pain,very vague    Diet: DIET GENERAL; Dysphagia Pureed  Dietary Nutrition Supplements: Frozen Oral Supplement                                ip days:1  Medications:   Scheduled Meds:   ciprofloxacin  400 mg Intravenous Q12H    spironolactone  25 mg Oral Daily    sodium chloride flush  10 mL Intravenous 2 times per day    sucralfate  1 g Oral 4x Daily    polyethylene glycol  17 g Oral Nightly    sodium chloride flush  10 mL Intravenous 2 times per day    enoxaparin  40 mg Subcutaneous Daily    carvedilol  25 mg Oral BID WC    hydrALAZINE  75 mg Oral 2 times per day    simvastatin  40 mg Oral Nightly    pantoprazole  40 mg Oral Daily     Continuous Infusions:   sodium chloride 50 mL/hr at 01/02/20 0209     PRN Meds:.acetaminophen, sodium chloride flush, potassium chloride **OR** potassium alternative oral replacement **OR** potassium chloride, magnesium sulfate, polyethylene glycol, ondansetron, nicotine polacrilex, acetaminophen, sennosides-docusate sodium, sodium chloride flush, metoprolol, HYDROcodone 5 mg - acetaminophen **OR** HYDROcodone 5 mg - acetaminophen     CBC:   Recent Labs     01/01/20  0845 01/02/20  0556 01/03/20  0816   WBC 8.8 7.0 11.6*   HGB 12.3 11.1* 10.5*    214 208     BMP:    Recent Labs     01/01/20  0556 01/01/20  1613 01/02/20  0556 01/03/20  0816     --  141 140   K 2.8* 4.9 3.8 3.6*     --  106 107   CO2 25  --  25 20   BUN 13  --  14 16   CREATININE 0.54  --  0.63 0.88   GLUCOSE 115*  --  102* 94     Hepatic:   Recent Labs     01/02/20  0556 01/03/20  0816   AST 13 14   ALT 7 6   BILITOT 0.30 0.26*   ALKPHOS 45 39     Troponin: No results for input(s): TROPONINI in the last 72 hours.   BNP: No results for input(s): BNP in the last 72 hours. Lipids: No results for input(s): CHOL, HDL in the last 72 hours. Invalid input(s): LDLCALCU  INR: No results for input(s): INR in the last 72 hours. Objective:   Vitals: BP (!) 130/57   Pulse 65   Temp 97.5 °F (36.4 °C) (Oral)   Resp 18   Ht 5' 2\" (1.575 m)   Wt 143 lb (64.9 kg)   SpO2 96%   BMI 26.16 kg/m²   General appearance: alert, appears stated age and cooperative  Skin: Skin color, texture, turgor normal. No rashes or lesions  Lungs: clear to auscultation bilaterally  Heart: regular rate and rhythm, S1, S2 normal, no murmur, click, rub or gallop  Abdomen: soft, non-tender; bowel sounds normal; no masses,  no organomegaly  Extremities: extremities normal, atraumatic, no cyanosis or edema  Neurologic: Mental status: Alert, oriented, thought content appropriate    Prophylaxis:   DVT with  [x] lovenox        [] heparin        [] Scd        [] none:     Radiology:  No results found. Assessment :   1. Chest pain/w/u negative/  2. Await placement to rehab     Plan:   1. See order       Patient Active Problem List:     Chronic pancreatitis [facility])     Essential hypertension     Hyperlipidemia     Psoriasis     Gastroesophageal reflux disease without esophagitis     Bell's palsy     Chronic deep vein thrombosis (DVT) of both lower extremities (HCC)     Tobacco abuse     History of alcohol abuse     Malignant neoplasm of upper-outer quadrant of left female breast (HCC)     Obesity (BMI 30.0-34. 9)     Anemia associated with acute blood loss     Hematoma (nontraumatic) of breast     Local infection of skin and subcutaneous tissue     Pseudomonas aeruginosa infection     Anorexia     Orthostatic dizziness     Hyperkalemia     Acute kidney injury ([address]     Alcohol-induced chronic pancreatitis (HCC)     Delayed surgical wound healing     Cellulitis     Anemia     Noncompliance     CKD (chronic kidney disease) stage 3, GFR 30-59 ml/min (HCC)     Iron deficiency anemia     TIA (transient

## 2020-01-04 NOTE — PROGRESS NOTES
Date:   1/4/2020  Patient name: Litzy Dillard  Date of admission:  12/31/2019 12:23 PM  MRN:   [id number]  YOB: 1940  PCP: Ardith Landau, MD    Reason for Admission: Elevated troponin [R79.89]  Elevated troponin [R79.89]    Cardiology consult: Abnormal troponin       Impression  Accelerated hypertension  Normal LV function, normal wall motion, 2D echo 9/25/2019 ejection fraction more than 55%  Noncompliant for medication  History of CVA, left hemiplegia, poor mobility  History of GI bleeding  Lymphedema both lower extremities    KAILA, serum creatinine 1.71 probably secondary to Cipro + diuretics     History of presenting illness:   54-year-old female with past medical history of hypertension, CVA left hemiplegia got admitted with the elevated systolic blood pressure over 200 mmHg.  Patient was sent in by her home health aide after they noticed her blood pressure  very high.  On further history examination patient also complained of constant chest pain sharp in nature.  She also had a chest wall tenderness  She was previously admitted on 12/4/2019 with a left upper chest and shoulder pain.  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  High-sensitivity troponin was elevated at 33 and 33.     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, no change from ECG obtained on 12/4/2019  Labs 1/1/2020  Sodium 140, potassium 2.8, BUN 13, creatinine 0.54, myoglobin 29, high-sensitivity troponin 61 and 65  Hemoglobin 12.3, WBC 8.8, platelets 793 MCV 93.8     2D echo 9/25/2019 normal LV size, ejection fraction more than 55%, no significant valvular abnormality     Past surgical history:   Cholecystectomy, hysterectomy, stomach surgery, appendectomy, left breast lumpectomy, mastectomy 9/12/2006, total knee arthroplasty right    January 4, 2019  Patient seen and examined  Discussed with the patient nurse patient not eating and drinking well  Patient was resting Gastroesophageal reflux disease     Mild malnutrition (HCC)     Chest pain     Elevated troponin     Hypertensive urgency     Hypokalemia      Plan of Treatment:   IV normal saline 100 mL/h for 2 L fluid  DC IV diuretics  DC Cipro IV  BMP tomorrow    Electronically signed by Connor Betancourt MD on 1/4/2020 at 12:58 PM

## 2020-01-04 NOTE — PROGRESS NOTES
Dago hope. Assesses patient. Aware of low output. Verbal order given to hold BP meds and diuretics today. Informed him that patient not eating or drinking.

## 2020-01-04 NOTE — CARE COORDINATION
ONGOING DISCHARGE PLAN:    LSW following for Placement to [address] [place] [address] Rd. Will have [employer] at home once she return home. Tonya from Palliative Care following patient. Patient has poor appetite, low urine output    Syliva Iyer from [place] following for Patient lift for home. Remains on IV fluids, IV lasix, x 1 dose today. Will continue to follow for additional discharge needs.     Electronically signed by Jaspreet Tapia RN on 1/4/2020 at 12:07 PM

## 2020-01-04 NOTE — PROGRESS NOTES
Absolute Eos # 0.00 0.0 - 0.4 k/uL    Basophils Absolute 0.00 0.0 - 0.2 k/uL    Morphology ANISOCYTOSIS PRESENT     Morphology HYPOCHROMIA PRESENT     Morphology SLT TARGET CELLS     Morphology SLT TEARDROPS        Physical Examination:        Vitals:  /75   Pulse 68   Temp 98.4 °F (36.9 °C) (Oral)   Resp 16   Ht 5' 2\" (1.575 m)   Wt 142 lb 13.7 oz (64.8 kg)   SpO2 99%   BMI 26.13 kg/m²   Temp (24hrs), Av.9 °F (36.6 °C), Min:97.5 °F (36.4 °C), Max:98.4 °F (36.9 °C)    No results for input(s): POCGLU in the last 72 hours. General appearance - alert,   Mental status - oriented to person, place, and time with normal affect  Head - normocephalic and atraumatic  Eyes - pupils equal and reactive, extraocular eye movements intact, conjunctiva clear  Ears - hearing appears to be intact  Nose - no drainage noted  Mouth - mucous membranes moist  Neck - supple, no carotid bruits, thyroid not palpable  Chest -  breath sounds bilaterally equal with no wheezing or crackles on auscultation  Heart - normal rate, regular rhythm, S1 + S2 no murmurs  Abdomen - soft, nontender, nondistended, bowel sounds present all four quadrants,   Neurological -left hemiparesis  Extremities -  no calf tenderness, distal pulses intact bilaterally, no pedal edema  Skin - no gross lesions, rashes, or induration noted  Musculoskeletal: normal ROM bilateral wrists and hands  Lymphatics: No cervical lymphadenopathy  Psych: Mood and affect is normal      Assessment:        Primary Problem  Hypertensive urgency     Principal Problem:    Hypertensive urgency  Active Problems:    Essential hypertension    Gastroesophageal reflux disease without esophagitis    Chronic deep vein thrombosis (DVT) of both lower extremities (HCC)    CKD (chronic kidney disease) stage 3, GFR 30-59 ml/min ([facility])    Chest pain    Elevated troponin    Hypokalemia    Acute cystitis without hematuria  Resolved Problems:    * No resolved hospital problems.

## 2020-01-04 NOTE — PROGRESS NOTES
for Short term goals: 3-5  treatments/ week  Short term goal 1:  pt to tolerate 1/2 hour of therapuetic exercise and activity  Short term goal 2:  pt to demonstrate good technique for LE left PROM and right LE AROM/ strengthening   Short term goal 3:  Pt able to roll side to side in bed at max x 2  to left and max assist x 2 to reposition using rail to the right  Short term goal 4: instruct family if present for ROM,  rolling and turning schedule for positioning- family to demonstrate good technique    PT Individual Minutes  Time In: (P) 0754  Time Out: (P) 0811  Minutes: (P) 17    Electronically signed by Annmarie Haile PTA on 1/4/20 at 8:11 AM         01/04/20 0810   PT Individual Minutes   Time In 6076   Time Out 0811   Minutes 16

## 2020-01-04 NOTE — PROGRESS NOTES
Patient refused most of her morning pills crushed in her pureed food. Did take a little bite of this, then complained of nausea. Medicated with Zofran at 0824. Now states that she is no longer nauseated. BP down to 124/ 70. Refusing to take any bites of applesauce. Will observe BP and see if elevates, with possibility she didn't swallow the med. Patient will only take small sips of liquids when offered. Output remains poor- about 100 ml of dark urine output noted.

## 2020-01-04 NOTE — PROGRESS NOTES
Spoke to Dr Flores Currie regarding patient not urinating yet this shift and bladder scan now shows 181mL. Telephone orders placed with read back for 1L bolus over 2 hours and a one time dose of Lasix 40mg IV.

## 2020-01-05 NOTE — PROGRESS NOTES
20 1056   PT Individual Minutes   Time In 1040   Time Out 1056   Minutes 16        20 1056   PT Individual Minutes   Time In 1040   Time Out 1056   [address]   Physical Therapy Progress Note    Date: 20  Patient Name: Adalid Dillard       Room:   MRN: [id number]   Account: [de-identified]   : 1940  ([de-identified] y.o.)   Gender: female     Discharge Recommendations   Patient would benefit from continued therapy after discharge  Equipment Needed: (TBD? , family interested in lift system per chart)    Referring Practitioner: Dr Joy Adams   Diagnosis: Hypertensive Urgency   Restrictions/Precautions: Fall Risk(peripheral IV right hand, external urinary catheter, telesitter, troponins 61 on 19)  Implants present? : Metal implants(right TKR)  Other position/activity restrictions:  NO BP OR LAB DRAWS LEFT ARM due to left breast mastectomy, up as tolerated, pureed diet   Past Medical History:  has a past medical history of Abnormal kidney function, Acid reflux disease, Anesthesia, Arthritis, Bell's palsy, Cancer ([address] Cerebral artery occlusion with cerebral infarction ([address] Cerebrovascular disease, COPD (chronic obstructive pulmonary disease) ([address] Deep vein thrombosis (DVT) ([address] Edema, History of cancer of left breast, Hx of blood clots, Hyperlipidemia, Hypertension, Pancreatitis, Wears dentures, and Wears glasses. Past Surgical History:   has a past surgical history that includes Cholecystectomy (); Hysterectomy; Stomach surgery; Total knee arthroplasty (Right); Tubal ligation; Appendectomy; Breast lumpectomy (Left, 2016); pr sono guide needle biopsy (2016); Mastectomy (Left, 2016); Upper gastrointestinal endoscopy (N/A, 2019); and Colonoscopy (N/A, 2019).   Additional Pertinent Hx: hx CVA w/ left hemiplegia, hx chronic Bilateral LE  lymphedema    Overall Orientation Status: Impaired  Orientation Education  New Education Provided:    Learner:patient  Method: explanation       Outcome: needs reinforcement     Goals  Short term goals  Time Frame for Short term goals: 3-5  treatments/ week  Short term goal 1:  pt to tolerate 1/2 hour of therapuetic exercise and activity  Short term goal 2:  pt to demonstrate good technique for LE left PROM and right LE AROM/ strengthening   Short term goal 3:  Pt able to roll side to side in bed at max x 2  to left and max assist x 2 to reposition using rail to the right  Short term goal 4: instruct family if present for ROM,  rolling and turning schedule for positioning- family to demonstrate good technique    PT Individual Minutes  Time In: (P) 1040  Time Out: (P) 1056  Minutes: (P) 16    Electronically signed by Indy Worthington PTA on 1/5/20 at 10:57 AM

## 2020-01-05 NOTE — PROGRESS NOTES
Reina inserted with some difficulty. Return of thick cloudy urine received.  Sample sent to lab per protocol

## 2020-01-05 NOTE — CARE COORDINATION
ONGOING DISCHARGE PLAN:    LSW following for pre cert to [facility]. Will have [employer] at home once she returns home. If Pt does not qualify for Rehab, she will return to home w/ her w/ [facility], [employer] at Home. Lia Hall from [place] still working on chair lift for patient. Remains on IV Ancef, Iv fluids    Will continue to follow for additional discharge needs.     Electronically signed by Vito Garrett RN on 1/5/2020 at 8:52 AM

## 2020-01-05 NOTE — PROGRESS NOTES
pancreatitis [facility])     Delayed surgical wound healing     Cellulitis     Anemia     Noncompliance     CKD (chronic kidney disease) stage 3, GFR 30-59 ml/min ([facility])     Iron deficiency anemia     TIA (transient ischemic attack)     Generalized abdominal pain     Adnexal mass     Bilateral carotid artery stenosis     Impaired gait     GI bleed     Acute deep vein thrombosis (DVT) of distal vein of lower extremity ([facility])     Occult blood positive stool     Moderate obesity     Gastroesophageal reflux disease     Mild malnutrition ([facility])     Chest pain     Elevated troponin     Hypertensive urgency     Hypokalemia     Acute cystitis without hematuria      Plan of Treatment:   Education checked  Continue with IV fluids  IM notes reviewed    Electronically signed by Brad Miranda MD on 1/5/2020 at 2:41 PM

## 2020-01-05 NOTE — FLOWSHEET NOTE
01/05/20 1009   Encounter Summary   Services provided to: Patient   Referral/Consult From: Palliative Care   Continue Visiting   (1-5-20)   Complexity of Encounter Moderate   Length of Encounter 15 minutes   Spiritual Assessment Completed Yes   Routine   Type Follow up   Spiritual/Quaker   Type Spiritual support   Assessment Approachable;Passive   Intervention Active listening;Prayer;Sustaining presence/ Ministry of presence   Outcome Expressed gratitude

## 2020-01-05 NOTE — PROGRESS NOTES
Progress Note    1/5/2020   1:58 PM    Name:  Spencer Dillard  MRN:    [id number]     Acct:     [de-identified]   Room:  204/2045[facility] Day: 3     Admit Date: 12/31/2019 12:23 PM  PCP: Brenda Fontanez MD    Subjective:     C/C:   Chief Complaint   Patient presents with    Hypertension       Interval History: Status: not changed. Patient is resting comfortably, no respiratory distress. ROS:   all 10 systems reviewed and are negative except as noted above in HPI        Medications: Allergies:    Allergies   Allergen Reactions    Latex Hives    Asa [Aspirin] Nausea Only     Stomach pain       Current Meds: ceFAZolin (ANCEF) 500 mg in dextrose 5 % 50 mL IVPB (mini-bag), Q12H  acetaminophen (TYLENOL) suppository 650 mg, Q4H PRN  spironolactone (ALDACTONE) tablet 25 mg, Daily  sodium chloride flush 0.9 % injection 10 mL, 2 times per day  sodium chloride flush 0.9 % injection 10 mL, PRN  potassium chloride (KLOR-CON M) extended release tablet 40 mEq, PRN    Or  potassium bicarb-citric acid (EFFER-K) effervescent tablet 40 mEq, PRN    Or  potassium chloride 10 mEq/100 mL IVPB (Peripheral Line), PRN  magnesium sulfate 1 g in dextrose 5% 100 mL IVPB, PRN  polyethylene glycol (GLYCOLAX) packet 17 g, Daily PRN  ondansetron (ZOFRAN) injection 4 mg, Q6H PRN  nicotine polacrilex (NICORETTE) gum 2 mg, Q2H PRN  acetaminophen (TYLENOL) tablet 650 mg, Q4H PRN  sennosides-docusate sodium (SENOKOT-S) 8.6-50 MG tablet 1 tablet, Nightly PRN  sucralfate (CARAFATE) tablet 1 g, 4x Daily  polyethylene glycol (GLYCOLAX) packet 17 g, Nightly  sodium chloride flush 0.9 % injection 10 mL, 2 times per day  sodium chloride flush 0.9 % injection 10 mL, PRN  enoxaparin (LOVENOX) injection 40 mg, Daily  metoprolol (LOPRESSOR) injection 5 mg, Q6H PRN  carvedilol (COREG) tablet 25 mg, BID WC  hydrALAZINE (APRESOLINE) tablet 75 mg, 2 times per day  simvastatin (ZOCOR) tablet 40 mg, Nightly  pantoprazole (PROTONIX) tablet 40 mg, Daily  0.9 % sodium chloride infusion, Continuous  HYDROcodone-acetaminophen (NORCO) 5-325 MG per tablet 1 tablet, Q4H PRN    Or  HYDROcodone-acetaminophen (NORCO) 5-325 MG per tablet 2 tablet, Q4H PRN        Data:     Code Status:  Full Code    Family History   Problem Relation Age of Onset    High Blood Pressure Mother     Heart Disease Mother     Other Father         TB       Social History     Socioeconomic History    Marital status:      Spouse name: Not on file    Number of children: Not on file    Years of education: Not on file    Highest education level: Not on file   Occupational History    Not on file   Social Needs    Financial resource strain: Not on file    Food insecurity:     Worry: Not on file     Inability: Not on file    Transportation needs:     Medical: Not on file     Non-medical: Not on file   Tobacco Use    Smoking status: Former Smoker     Packs/day: 1.00     Years: 54.00     Pack years: 54.00     Last attempt to quit: 2019     Years since quittin.2    Smokeless tobacco: Never Used   Substance and Sexual Activity    Alcohol use: Not Currently    Drug use: No    Sexual activity: Not on file   Lifestyle    Physical activity:     Days per week: Not on file     Minutes per session: Not on file    Stress: Not on file   Relationships    Social connections:     Talks on phone: Not on file     Gets together: Not on file     Attends Gnosticism service: Not on file     Active member of club or organization: Not on file     Attends meetings of clubs or organizations: Not on file     Relationship status: Not on file    Intimate partner violence:     Fear of current or ex partner: Not on file     Emotionally abused: Not on file     Physically abused: Not on file     Forced sexual activity: Not on file   Other Topics Concern    Not on file   Social History Narrative    Not on file       I/O (24Hr):     Intake/Output Summary (Last 24 hours) at 2020 5759  Last data filed at 1/5/2020 1254  Gross per 24 hour   Intake 3254 ml   Output 550 ml   Net 2704 ml       Labs:  Recent Results (from the past 24 hour(s))   Urinalysis Reflex to Culture    Collection Time: 01/04/20  8:15 PM   Result Value Ref Range    Color, UA YELLOW YELLOW    Turbidity UA TURBID (A) CLEAR    Glucose, Ur NEGATIVE NEGATIVE    Bilirubin Urine NEGATIVE NEGATIVE    Ketones, Urine NEGATIVE NEGATIVE    Specific Gravity, UA 1.005 1.000 - 1.030    Urine Hgb TRACE (A) NEGATIVE    pH, UA 5.0 5.0 - 8.0    Protein, UA NEGATIVE NEGATIVE    Urobilinogen, Urine Normal Normal    Nitrite, Urine NEGATIVE NEGATIVE    Leukocyte Esterase, Urine LARGE (A) NEGATIVE    Urinalysis Comments NOT REPORTED    Microscopic Urinalysis    Collection Time: 01/04/20  8:15 PM   Result Value Ref Range    -          WBC, UA TOO NUMEROUS TO COUNT /HPF    RBC, UA 2 TO 5 /HPF    Casts UA NOT REPORTED /LPF    Crystals UA NOT REPORTED None /HPF    Epithelial Cells UA 10 TO 20 /HPF    Renal Epithelial, Urine NOT REPORTED 0 /HPF    Bacteria, UA MANY (A) None    Mucus, UA NOT REPORTED None    Trichomonas, UA NOT REPORTED None    Amorphous, UA 2+ (A) None    Other Observations UA NOT REPORTED NOT REQ.     Yeast, UA NOT REPORTED None   Comprehensive Metabolic Panel w/ Reflex to MG    Collection Time: 01/05/20  6:54 AM   Result Value Ref Range    Glucose 70 70 - 99 mg/dL    BUN 23 8 - 23 mg/dL    CREATININE 2.24 (H) 0.50 - 0.90 mg/dL    Bun/Cre Ratio NOT REPORTED 9 - 20    Calcium 7.6 (L) 8.6 - 10.4 mg/dL    Sodium 143 135 - 144 mmol/L    Potassium 4.0 3.7 - 5.3 mmol/L    Chloride 112 (H) 98 - 107 mmol/L    CO2 18 (L) 20 - 31 mmol/L    Anion Gap 13 9 - 17 mmol/L    Alkaline Phosphatase 46 35 - 104 U/L    ALT 6 5 - 33 U/L    AST 19 <32 U/L    Total Bilirubin 0.18 (L) 0.3 - 1.2 mg/dL    Total Protein 5.5 (L) 6.4 - 8.3 g/dL    Alb 2.4 (L) 3.5 - 5.2 g/dL    Albumin/Globulin Ratio NOT REPORTED 1.0 - 2.5    GFR Non- 21 (L) >60 mL/min    GFR  25 (L) >60 mL/min    GFR Comment          GFR Staging NOT REPORTED    CBC auto differential    Collection Time: 20  6:54 AM   Result Value Ref Range    WBC 14.1 (H) 3.5 - 11.0 k/uL    RBC 4.15 4.0 - 5.2 m/uL    Hemoglobin 9.7 (L) 12.0 - 16.0 g/dL    Hematocrit 31.0 (L) 36 - 46 %    MCV 74.5 (L) 80 - 100 fL    MCH 23.4 (L) 26 - 34 pg    MCHC 31.5 31 - 37 g/dL    RDW 19.9 (H) 11.5 - 14.9 %    Platelets 393 276 - 220 k/uL    MPV 9.8 6.0 - 12.0 fL    NRBC Automated NOT REPORTED per 100 WBC    Differential Type NOT REPORTED     Immature Granulocytes NOT REPORTED 0 %    Absolute Immature Granulocyte NOT REPORTED 0.00 - 0.30 k/uL    WBC Morphology NOT REPORTED     RBC Morphology NOT REPORTED     Platelet Estimate NOT REPORTED     Seg Neutrophils 92 (H) 36 - 66 %    Lymphocytes 2 (L) 24 - 44 %    Monocytes 3 1 - 7 %    Eosinophils % 0 0 - 4 %    Basophils 0 0 - 2 %    Bands 3 0 - 10 %    Segs Absolute 12.98 (H) 1.3 - 9.1 k/uL    Absolute Lymph # 0.28 (L) 1.0 - 4.8 k/uL    Absolute Mono # 0.42 0.1 - 1.3 k/uL    Absolute Eos # 0.00 0.0 - 0.4 k/uL    Basophils Absolute 0.00 0.0 - 0.2 k/uL    Absolute Bands # 0.42 0.0 - 1.0 k/uL    Morphology 1+ ELLIPTOCYTES     Morphology ANISOCYTOSIS PRESENT     Morphology 1+ TEARDROPS     Morphology 1+ ECHINOCYTES     Morphology HYPOCHROMIA PRESENT     Morphology 1+ TARGET CELLS        Physical Examination:        Vitals:  BP (!) 172/80   Pulse 73   Temp 98.8 °F (37.1 °C) (Oral)   Resp 18   Ht 5' 2\" (1.575 m)   Wt 142 lb 13.7 oz (64.8 kg)   SpO2 93%   BMI 26.13 kg/m²   Temp (24hrs), Av.9 °F (36.6 °C), Min:97.3 °F (36.3 °C), Max:98.8 °F (37.1 °C)    No results for input(s): POCGLU in the last 72 hours.     General appearance - alert,   Mental status - oriented to person, place, and time with normal affect  Head - normocephalic and atraumatic  Eyes - pupils equal and reactive, extraocular eye movements intact, conjunctiva clear  Ears - hearing appears to be intact  Nose - no drainage noted  Mouth - mucous membranes moist  Neck - supple, no carotid bruits, thyroid not palpable  Chest -  breath sounds bilaterally equal with no wheezing or crackles on auscultation  Heart - normal rate, regular rhythm, S1 + S2 no murmurs  Abdomen - soft, nontender, nondistended, bowel sounds present all four quadrants,   Neurological - normal speech, left hemiparesis. Extremities -  no calf tenderness, distal pulses intact bilaterally, no pedal edema  Skin - no gross lesions, rashes, or induration noted  Musculoskeletal: normal ROM bilateral wrists and hands  Lymphatics: No cervical lymphadenopathy  Psych: Mood and affect is normal      Assessment:        Primary Problem  Hypertensive urgency     Principal Problem:    Hypertensive urgency  Active Problems:    Essential hypertension    Gastroesophageal reflux disease without esophagitis    Chronic deep vein thrombosis (DVT) of both lower extremities (HCC)    CKD (chronic kidney disease) stage 3, GFR 30-59 ml/min ([facility])    Chest pain    Elevated troponin    Hypokalemia    Acute cystitis without hematuria  Resolved Problems:    * No resolved hospital problems. *      Past Medical History:   Diagnosis Date    Abnormal kidney function     PT. RELATES UNAWARE OF.  Acid reflux disease     Anesthesia     'needs to be asleep when ET tube removed due  to severe gastric reflux will make me have hard time breathing. \"     Arthritis     Bell's palsy     Cancer ([facility]     Breast, lt.  Cerebral artery occlusion with cerebral infarction [facility])     Cerebrovascular disease     pt denies stroke she says she had bells palsy    COPD (chronic obstructive pulmonary disease) ([address]     patient is unaware    Deep vein thrombosis (DVT) ([facility]     multiple    Edema     RT. LEG.  History of cancer of left breast     Hx of blood clots     dvt scott. legs.     Hyperlipidemia     Hypertension     Pancreatitis     Wears dentures     UPPER    Wears glasses         Plan:

## 2020-01-06 PROBLEM — E44.0 MODERATE MALNUTRITION (HCC): Status: ACTIVE | Noted: 2020-01-01

## 2020-01-06 NOTE — PROGRESS NOTES
lymphedema. Very drowsy. E.coli in urine.  Altered mental status   · Current Nutrition Therapies:  · Oral Diet Orders: General, Dysphagia Pureed (Dysphagia 1)   · Oral Diet intake: 0%  · Oral Nutrition Supplement (ONS) Orders: Frozen Oral Supplement  · ONS intake: 0%  · Anthropometric Measures:  · Ht: 5' 2\" (157.5 cm)   · Current Body Wt: 145 lb (65.8 kg)  · Admission Body Wt: 143 lb (64.9 kg)  · Usual Body Wt: 155 lb (70.3 kg)(7/27/19)  · % Weight Change:  ,  7.7% loss in 5 months based on admission weight   · Ideal Body Wt: 110 lb (49.9 kg), % Ideal Body 130% based on admission weight   · BMI Classification: BMI 25.0 - 29.9 Overweight    Nutrition Interventions:   Continue current diet, Modify current ONS  Continued Inpatient Monitoring    Nutrition Evaluation:   · Evaluation: Goals set   · Goals: PO intakes are greater than 75% at meals    · Monitoring: Meal Intake, Supplement Intake, Pertinent Labs, Weight, Monitor Bowel Function, Diet Tolerance, Skin Integrity, I&O, Mental Status/Confusion, Chewing/Swallowing      Antoine Acharya  [facility], RJOVANI, LJOVANI,  Clinical Dietitian  Cell # [phone]  Office # [phone]

## 2020-01-06 NOTE — PROGRESS NOTES
ISABELA informed that insurance denied SNF request. Facility will not accept pt under her medicaid. ISABELA informed pt's daughter Carol Acosta. She asked if we could ask [facility] to accept pt under medicaid. ISABELA did send referral and spoke to [facility].  They will review referral.

## 2020-01-06 NOTE — CARE COORDINATION
DISCHARGE PLANNING NOTE:      LSW is following for patient to discharge to [address] Precert was started today. PT recommends continued therapy after discharge. Current orders include IV ancef. Cardiology is following. Robert Jorge from [address] [place] [address] is following up on lift for patient. ROSI IS SIGNED AND NEEDS COMPLETED. Will continue to follow with LSW for discharge needs.

## 2020-01-06 NOTE — FLOWSHEET NOTE
01/06/20 1532   Encounter Summary   Services provided to: Patient   Referral/Consult From: Palliative Care   Complexity of Encounter Low   Length of Encounter 15 minutes   Spiritual/Sikhism   Type Spiritual support   Assessment Sleeping   Intervention Prayer   Outcome Did not respond

## 2020-01-06 NOTE — DISCHARGE INSTR - COC
Influenza, MDCK, Preservative free 10/07/2013    Influenza, Quadv, IM, PF (6 mo and older Fluzone, Flulaval, Fluarix, and 3 yrs and older Afluria) 09/26/2019    Pneumococcal Conjugate 13-valent ([id number]) 10/15/2016    Pneumococcal Conjugate Vaccine 10/28/2015    Pneumococcal Polysaccharide ([id number]) 02/01/2011       Active Problems:  Patient Active Problem List   Diagnosis Code    Chronic pancreatitis ([facility] [address] K86.1    Essential hypertension I10    Hyperlipidemia E78.5    Psoriasis L40.9    Gastroesophageal reflux disease without esophagitis K21.9    Bell's palsy G51.0    Chronic deep vein thrombosis (DVT) of both lower extremities (HCC) I82.503    Tobacco abuse Z72.0    History of alcohol abuse F10.11    Malignant neoplasm of upper-outer quadrant of left female breast (HCC) C50.412    Obesity (BMI 30.0-34. 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    Delayed surgical wound healing T81.89XA    Cellulitis L03.90    Anemia D64.9    Noncompliance Z91.19    CKD (chronic kidney disease) stage 3, GFR 30-59 ml/min (HCC) N18.3    Iron deficiency anemia D50.9    TIA (transient ischemic attack) G45.9    Generalized abdominal pain R10.84    Adnexal mass N94.89    Bilateral carotid artery stenosis I65.23    Impaired gait R26.9    GI bleed K92.2    Acute deep vein thrombosis (DVT) of distal vein of lower extremity (HCC) I82.4Z9    Occult blood positive stool R19.5    Moderate obesity E66.8    Gastroesophageal reflux disease K21.9    Mild malnutrition (HCC) E44.1    Chest pain R07.9    Elevated troponin R79.89    Hypertensive urgency I16.0    Hypokalemia E87.6    Acute cystitis without hematuria N30.00       Isolation/Infection:   Isolation          No Isolation        Patient Infection Status     None to display          Nurse Assessment:  Last Vital Signs: BP (!) 186/82   Pulse 73   Temp 98.4 °F (36.9 °C) (Oral)   Resp 20   Ht 5' 2\" (1.575 m)   Wt 145 lb 1 oz (65.8 kg)   SpO2 99%   BMI 26.53 kg/m²     Last documented pain score (0-10 scale): Pain Level: 0  Last Weight:   Wt Readings from Last 1 Encounters:   01/06/20 145 lb 1 oz (65.8 kg)     Mental Status:  {IP PT MENTAL STATUS:20030}    IV Access:  { ROSI IV ACCESS:586194605}    Nursing Mobility/ADLs:  Walking   {[facility] DME SGPO:593735913}  Transfer  {[facility] DME OJHZ:686373088}  Bathing  {[facility] DME NBPZ:991637093}  Dressing  {[facility] DME KOBQ:920717260}  Toileting  {[facility] DME PSMF:036182046}  Feeding  {[facility] DME ZFPR:542626553}  Med Admin  {[facility] DME RUZV:736969956}  Med Delivery   { ROSI MED Delivery:174560040}    Wound Care Documentation and Therapy:  Wound 10/21/16 Wound #2 left axilla, surgical, abcess post mastectomy, 9/25/16 (Active)   Number of days: 6339       Wound 11/21/19 Back Medial;Right darkened skin, altered texture, ridging, nonblanchable, well-approximated (Active)   Dressing Status Other (Comment) 1/5/2020  9:27 PM   Dressing/Treatment Moisturizing cream 1/5/2020  9:27 PM   Wound Assessment Other (Comment) 1/5/2020  9:27 PM   Drainage Amount None 1/5/2020  9:27 PM   Odor None 1/2/2020  8:59 PM   Margins Defined edges 1/2/2020  8:59 PM   Non-staged Wound Description Not applicable 51/6/3466  9:89 PM   Culture Taken No 1/3/2020 12:15 PM   Number of days: 46        Elimination:  Continence:   · Bowel: {YES / MH:63357}  · Bladder: {YES / KS:87336}  Urinary Catheter: {Urinary Catheter:296921717}   Colostomy/Ileostomy/Ileal Conduit: {YES / JJ:84593}       Date of Last BM: ***    Intake/Output Summary (Last 24 hours) at 1/6/2020 1000  Last data filed at 1/6/2020 0622  Gross per 24 hour   Intake 3518 ml   Output 500 ml   Net 3018 ml     I/O last 3 completed shifts: In: 3618 [P.O.:425; I.V.:3193]  Out: 500 [Urine:500]    Safety Concerns:     [address] [employer] Safety Concerns:088905341}    Impairments/Disabilities:      [address] [employer] Impairments/Disabilities:881580436}    Nutrition Therapy:  Current Nutrition Therapy:   [address] [employer] Diet List:309616229}    Routes of Feeding: {[facility] DME Other Feedings:193108019}  Liquids: {Slp liquid thickness:36236}  Daily Fluid Restriction: {CHP DME Yes amt example:567519867}  Last Modified Barium Swallow with Video (Video Swallowing Test): {Done Not Done MECZ:522788505}    Treatments at the Time of Hospital Discharge:   Respiratory Treatments: ***  Oxygen Therapy:  {Therapy; copd oxygen:26497}  Ventilator:    { CC Vent ZIWQ:734051742}    Rehab Therapies: Physical Therapy and Occupational Therapy  Weight Bearing Status/Restrictions: No weight bearing restirctions  Other Medical Equipment (for information only, NOT a DME order):  bedside commode, straight cane, walker, wheelchair, shower chair   Other Treatments: Skilled Nursing Assessment; Medication Education and Monitor      Home health care agency's  to evaluate patient two weeks prior to discharge from home health to determine post-discharge services. Patient's personal belongings (please select all that are sent with patient):  {[facility] DME Belongings:882814021}    RN SIGNATURE:  {Esignature:108792877}    CASE MANAGEMENT/SOCIAL WORK SECTION    Inpatient Status Date: 12/31/2019    Readmission Risk Assessment Score:  Readmission Risk              Risk of Unplanned Readmission:        49         Discharge VNS:  [facility]  P:  [phone]  F:  [phone]        / signature: Electronically signed by Weston Landeros RN on 1/7/20 at 3:40 PM    PHYSICIAN SECTION    Prognosis: Fair    Condition at Discharge: Stable    Rehab Potential (if transferring to Rehab):  Fair    Recommended Labs or Other Treatments After Discharge: none    Physician Certification: I certify the above information and transfer of Giovanni Mai  is necessary for the continuing treatment of the diagnosis listed and that she requires [facility] for greater 30 days.      Update Admission H&P: No change in H&P    PHYSICIAN SIGNATURE:  Electronically signed by Chriss Hutchins MD on 1/6/20 at 10:03 AM

## 2020-01-06 NOTE — PLAN OF CARE
Nutrition Problem: Inadequate oral intake  Intervention: Food and/or Nutrient Delivery: Continue current diet, Modify current ONS  Nutritional Goals: PO intakes are greater than 75% at meals

## 2020-01-07 NOTE — PROGRESS NOTES
Attempted to give pt. Her AM medications. Pt. Refusing to open her mouth stating, \"I am going to throw it up\". Pt. Educated on importance of taking medications.

## 2020-01-07 NOTE — CARE COORDINATION
ONGOING DISCHARGE PLAN:    LSW following for discharge planning to [address] under her Medicaid with referral made yesterday. Currently awaiting to here back from SNF. Active orders for IV ancef. Update- Patient to discharge today home with daughter and Debora Peon. Will continue to follow for additional discharge needs.     Electronically signed by Eliana Deal RN on 1/7/2020 at 11:15 AM

## 2020-01-07 NOTE — PROGRESS NOTES
Physical Therapy  DATE: 2020  NAME: Jeanmarie Dillard  MRN: [id number]   : 1940    Discharge Recommendations: Continue to Assess (pending progress)   Family interested in [facility] per chart. Subjective: Patient pleasant throughout treatment. Pain: Denies pain  Patient follows: Most Commands  Is patient on ventilator: No  Is patient on sedation: No  Restrictions/Precautions  Restrictions/Precautions: Fall Risk(peripheral IV right hand, external urinary catheter, telesitter, troponins 61 on 19)   Required Braces or Orthoses?: No  Implants present? : Metal implants(right TKR)  Position Activity Restriction  Other position/activity restrictions:  NO BP OR LAB DRAWS LEFT ARM due to left breast mastectomy, up as tolerated, pureed diet    Therapeutic exercises:  UE/LE(s)  Bilateral Active-assist range of motion (PROM LLE only) all planes x 15 reps  bilateral gastrocnemius stretching 3 reps x 20 seconds  Pillows placed under right hip and shoulder for pressure relief. Comment: Facial grimace with LLE PROM      Goals  Short Term Goals  Short term goal 1:  pt to tolerate 1/2 hour of therapuetic exercise and activity  Short term goal 2:  pt to demonstrate good technique for LE left PROM and right LE AROM/ strengthening   Short term goal 3:  Pt able to roll side to side in bed at max x 2  to left and max assist x 2 to reposition using rail to the right  Short term goal 4: instruct family if present for ROM,  rolling and turning schedule for positioning- family to demonstrate good technique       Plan: Progress functional mobility as medically appropriate.    Time In: 5521  Time Out: 3178  Time Coded Minutes (treatment minutes): 17  Rehab Potential: Fair  Treatments/week: 3-5  treatments/ week    Jose Raul Donis PTA

## 2020-01-07 NOTE — PLAN OF CARE
Problem: Falls - Risk of:  Goal: Will remain free from falls  Description  Will remain free from falls  1/7/2020 1630 by Kayden Carcamo RN  Outcome: Ongoing   Pt. Remains free of falls, appropriate fall precautions in place. Problem: Risk for Impaired Skin Integrity  Goal: Tissue integrity - skin and mucous membranes  Description  Structural intactness and normal physiological function of skin and  mucous membranes. 1/7/2020 1630 by Kayden Carcamo RN  Outcome: Ongoing   Skin assessment completed, waffle mattress in place. Will continue to monitor. Problem: Pain:  Goal: Control of acute pain  Description  Control of acute pain  1/7/2020 1630 by Kayden Carcamo RN  Outcome: Ongoing   Pt. Verbalizes pain is adequately controlled.

## 2020-01-07 NOTE — PROGRESS NOTES
ISABELA spoke to Pelon at [facility]. Pt will be accepted if pt can pay the patient liability cost. ISABELA spoke to pt's daughter Stewart Lozano who said she will take pt home. Judith asked to have pt home around 1530. Transport set for 1515. Addendum:discharge cancelled. Daughter informed.

## 2020-01-08 PROBLEM — E43 SEVERE MALNUTRITION (HCC): Status: RESOLVED | Noted: 2020-01-01 | Resolved: 2020-01-01

## 2020-01-08 NOTE — ADT AUTH CERT
Utilization Reviews         Hypertension - Care Day 6 (1/7/2020) by Дмитрий Chung RN         Review Status Review Entered   Completed 1/8/2020 14:35       Criteria Review      Care Day: 6 Care Date: 1/7/2020 Level of Care:    Guideline Day 1    Level Of Care    (X) ICU, [C] intermediate care, [D] or floor    Clinical Status    ( ) * Clinical Indications met [E]    (X) Possible chest pain, dyspnea, blurred vision, change in sensorium    Routes    (X) IV fluids, medications    1/8/2020 2:35 PM EST by Leetta Core      nacl @ 150ml/hr    ( ) Low-salt diet as tolerated    1/8/2020 2:35 PM EST by Leetta Core      general dysphagia diet    Interventions    (X) Urinalysis, chemistries, ECG, CXR    (X) Possible cardiac monitoring    1/8/2020 2:35 PM EST by [facility]      tele    Medications    (X) Oral or parenteral antihypertensives    * Milestone   Additional Notes   1/7      98.1 (36.7)  18  68  160/100Abnormal  -  -  -  -  100  None (Room air)       Basic Metabolic Prof [[id number]] (Abnormal) Collected: 01/07/20 1340     Specimen: Blood Updated: 01/07/20 1402     Glucose 83 mg/dL      BUN 24 mg/dL      CREATININE 2.23 mg/dL      Bun/Cre Ratio NOT REPORTED     Calcium 7.3 mg/dL      Sodium 145 mmol/L      Potassium 3.2 mmol/L      Chloride 117 mmol/L      CO2 15 mmol/L      Anion Gap 13 mmol/L      GFR Non-African American 21 mL/min      GFR African American 26 mL/min      GFR Comment          GFR Staging NOT REPORTED     CBC [[id number]] (Abnormal) Collected: 01/07/20 1340     Specimen: Blood Updated: 01/07/20 1355     WBC 9.6 k/uL      RBC 4.40 m/uL      Hemoglobin 10.5 g/dL      Hematocrit 34.8 %      MCV 79.0 fL      MCH 23.8 pg      MCHC 30.2 g/dL      RDW 20.2 %      Platelets 289 k/uL      MPV 9.6 fL      NRBC Automated NOT REPORTED per 100 WBC          Scheduled Meds:   Ancef iv q12hr    Lovenox daily   hydrALAZINE, 75 mg, PO BID   sodium bicarbonate, 650 mg, Oral, TID   spironolactone, 25 mg, Oral, Daily   sodium chloride flush, 10 mL, Intravenous, 2 times per day   sucralfate, 1 g, Oral, 4x Daily   polyethylene glycol, 17 g, Oral, Nightly   carvedilol, 25 mg, Oral, BID WC   simvastatin, 40 mg, Oral, Nightly   pantoprazole, 40 mg, Oral, Daily      Continuous Infusions: nacl @ 150ml/hr   PRN Meds:.acetaminophen, sodium chloride flush, potassium chloride **OR** potassium alternative oral replacement **OR** potassium chloride, magnesium sulfate, polyethylene glycol, ondansetron, nicotine polacrilex, acetaminophen, sennosides-docusate sodium, metoprolol, HYDROcodone 5 mg - acetaminophen **OR** HYDROcodone 5 mg - acetaminophen      PRN lopressor iv given x4      Cardio note   Reason for Admission: Elevated troponin [R79.89]   Elevated troponin [R79.89]       Cardiology consult: Abnormal troponin       Impression   Accelerated hypertension   Normal LV function, normal wall motion, 2D echo 9/25/2019 ejection fraction more than 55%   Noncompliant for medication   History of CVA, left hemiplegia, poor mobility   History of GI bleeding   Lymphedema both lower extremities       KAILA, serum creatinine 1.71 probably secondary to Cipro + diuretics      Attending note   Assessment :   1. Bun/cr/elevated/lovenox/d/c/nephro consulted   2. Htn/chest pain/negative w/u        Plan:   1.  Planning for d/c/cyril hold d/c get opinion from nephro   2.  See order                 Hypertension - Care Day 5 (1/6/2020) by Diego Mancia RN         Review Status Review Entered   Completed 1/8/2020 14:26       Criteria Review      Care Day: 5 Care Date: 1/6/2020 Level of Care:    Guideline Day 1    Level Of Care    (X) ICU, [C] intermediate care, [D] or floor    Clinical Status    ( ) * Clinical Indications met [E]    (X) Possible chest pain, dyspnea, blurred vision, change in sensorium    Routes    (X) IV fluids, medications    1/8/2020 2:26 PM EST by Franco Garrison @ 150ml/hr    ( ) Low-salt diet as tolerated    1/8/2020 2:26 PM   Morphology ANISOCYTOSIS PRESENT     Morphology FEW ECHINOCYTES      Scheduled Meds:   Ancef iv q12hr    Lovenox daily   hydrALAZINE, 75 mg, PO BID   sodium bicarbonate, 650 mg, Oral, TID   spironolactone, 25 mg, Oral, Daily   sodium chloride flush, 10 mL, Intravenous, 2 times per day   sucralfate, 1 g, Oral, 4x Daily   polyethylene glycol, 17 g, Oral, Nightly   carvedilol, 25 mg, Oral, BID WC   simvastatin, 40 mg, Oral, Nightly   pantoprazole, 40 mg, Oral, Daily      Continuous Infusions: nacl @ 150ml/hr   PRN Meds:.acetaminophen, sodium chloride flush, potassium chloride **OR** potassium alternative oral replacement **OR** potassium chloride, magnesium sulfate, polyethylene glycol, ondansetron, nicotine polacrilex, acetaminophen, sennosides-docusate sodium, metoprolol, HYDROcodone 5 mg - acetaminophen **OR** HYDROcodone 5 mg - acetaminophen      PRN lopressor iv given x1      Attending note       Interval History: still having pain,w/u negative,blood pressure still high       Diet: DIET GENERAL; Dysphagia Pureed      Assessment :   1. Chest pain/negative w/u/echo on 9/2019 normal lv size ef more than 55%   2.  Non compaint/h/o of cva        Plan:   1.  Continue present meds   2.  See order          SNF precert initiated       Hypertension - Care Day 4 (1/5/2020) by John Bee RN         Review Status Review Entered   Completed 1/8/2020 14:21       Criteria Review      Care Day: 4 Care Date: 1/5/2020 Level of Care:    Guideline Day 1    Level Of Care    (X) ICU, [C] intermediate care, [D] or floor    Clinical Status    ( ) * Clinical Indications met [E]    (X) Possible chest pain, dyspnea, blurred vision, change in sensorium    Routes    (X) IV fluids, medications    ( ) Low-salt diet as tolerated    1/8/2020 2:21 PM EST by Xavi mills dysphagia diet    Interventions    (X) Urinalysis, chemistries, ECG, CXR    (X) Possible cardiac monitoring    1/8/2020 2:21 PM EST by Romelia Matthews REPORTED   WBC Morphology: NOT REPORTED   RBC Morphology: NOT REPORTED   Morphology: 1+ TARGET CELLS      Attending note   Plan:          1. UTI- continue IV Ancef   2. Elevated creatinine due to diuresis, increased IV fluids normal saline 150 cc/h   3. Avoid diuretic   4. Continue supportive/scheduled medications   5. PPI   6. DVT Prophylaxis   7. EPCs   8.  PT/OT to evaluate and treat   9. check and replace electrolytes per sliding scale      Cardio note   Cardiology consult: Abnormal troponin                              Impression   Accelerated hypertension   Normal LV function, normal wall motion, 2D echo 9/25/2019 ejection fraction more than 55%   Noncompliant for medication   History of CVA, left hemiplegia, poor mobility   History of GI bleeding   Lymphedema both lower extremities       KAILA, serum creatinine 1.71 probably secondary to Cipro + diuretics

## 2020-01-08 NOTE — PLAN OF CARE
Problem: Falls - Risk of:  Goal: Will remain free from falls  Description  Will remain free from falls  1/8/2020 1604 by Ashlyn Whiteside RN  Outcome: Ongoing   Pt. Remains free of falls, appropriate fall precautions in place. Problem: Risk for Impaired Skin Integrity  Goal: Tissue integrity - skin and mucous membranes  Description  Structural intactness and normal physiological function of skin and  mucous membranes. 1/8/2020 1604 by Ashlyn Whiteside RN  Outcome: Ongoing   Skin assessment completed, Pt. Shows no new signs of skin break down. Problem: Pain:  Goal: Control of acute pain  Description  Control of acute pain  Outcome: Ongoing   Pt.  Pain adequately controlled, see MAR

## 2020-01-08 NOTE — PROGRESS NOTES
°C) (Oral)   Resp 18   Ht 5' 2\" (1.575 m)   Wt 145 lb 1 oz (65.8 kg)   SpO2 100%   BMI 26.53 kg/m²   General appearance: alert, appears stated age and cooperative  Skin: Skin color, texture, turgor normal. No rashes or lesions  Lungs: clear to auscultation bilaterally  Heart: regular rate and rhythm, S1, S2 normal, no murmur, click, rub or gallop  Abdomen: soft, non-tender; bowel sounds normal; no masses,  no organomegaly  Extremities: extremities normal, atraumatic, no cyanosis or edema  Neurologic: Mental status: Alert, oriented, thought content appropriate    Prophylaxis:   DVT with  [] lovenox        [] heparin        [] Scd        [] none:     Radiology:  No results found. Assessment :   1. Bun/cr/elevated/lovenox/d/c/nephro consulted  2. Htn/chest pain/negative w/u     Plan:   1. Planning for d/c/cyril hold d/c get opinion from nephro  2. See order    Patient Active Problem List:     Chronic pancreatitis [facility])     Essential hypertension     Hyperlipidemia     Psoriasis     Gastroesophageal reflux disease without esophagitis     Bell's palsy     Chronic deep vein thrombosis (DVT) of both lower extremities (HCC)     Tobacco abuse     History of alcohol abuse     Malignant neoplasm of upper-outer quadrant of left female breast (HCC)     Obesity (BMI 30.0-34. 9)     Anemia associated with acute blood loss     Hematoma (nontraumatic) of breast     Local infection of skin and subcutaneous tissue     Pseudomonas aeruginosa infection     Anorexia     Orthostatic dizziness     Hyperkalemia     Acute kidney injury ([address]     Alcohol-induced chronic pancreatitis (HCC)     Delayed surgical wound healing     Cellulitis     Anemia     Noncompliance     CKD (chronic kidney disease) stage 3, GFR 30-59 ml/min (HCC)     Iron deficiency anemia     TIA (transient ischemic attack)     Generalized abdominal pain     Adnexal mass     Bilateral carotid artery stenosis     Impaired gait     GI bleed     Acute deep vein

## 2020-01-08 NOTE — FLOWSHEET NOTE
01/08/20 1533   Encounter Summary   Services provided to: Patient   Referral/Consult From: Palliative Care   Complexity of Encounter Low   Length of Encounter 15 minutes   Spiritual/Gnosticist   Type Spiritual support   Assessment Sleeping   Intervention Prayer   Outcome Did not respond

## 2020-01-08 NOTE — PROGRESS NOTES
increased pain at this time  Other exercises 3: Provided patient with yellow sponge for left hand, patient  sponge x15 times(RN reports patient is typically does not pull at right wrist IV if left hand has something to hold on too. Patient left with yellow sponge in hand at end of session.)  Other exercises 4: Encouraged patient to drink/eat throughout day. Other exercises 5: Encouraged patient to perform AROM to R UE/LE throughout day. Other Activities: (Patient with dry flakey skin on (B) LE's. Patient requesting lotion be applied to LE's with personal lotion at bed side. Writer applies lotion to (B) skin to assist in skin integrity. Patient lifts/rolls R LE to assist in application.)  Comment: Encouraged drinking/eating and use of R UE for increased independence and nutrition throughout session. Goals  Short term goals  Time Frame for Short term goals: 3-5  treatments/ week  Short term goal 1:  pt to tolerate 1/2 hour of therapuetic exercise and activity  Short term goal 2:  pt to demonstrate good technique for LE left PROM and right LE AROM/ strengthening   Short term goal 3:  Pt able to roll side to side in bed at max x 2  to left and max assist x 2 to reposition using rail to the right  Short term goal 4: instruct family if present for ROM,  rolling and turning schedule for positioning- family to demonstrate good technique  Patient Goals   Patient goals : did not state a goal    Plan    Plan  Times per week: 3-5  treatments/ week  Times per day: (3-5  treatments/ week)  Specific instructions for Next Treatment: 1-3-2020 bedside eval completed, dep x 2 to roll either direction, FALL RISK- hx CVA w/ left hemiparesis, nonambulatory  Current Treatment Recommendations: Strengthening, ROM, Positioning, Patient/Caregiver Education & Training, Safety Education & Training  Safety Devices  Type of devices:  All fall risk precautions in place, Bed alarm in place, Call light within reach, Left in

## 2020-01-08 NOTE — PLAN OF CARE
Nutrition Problem: Inadequate oral intake  Intervention: Food and/or Nutrient Delivery: Continue current diet, Continue current ONS  Nutritional Goals: PO intakes are greater than 75% at meals

## 2020-01-08 NOTE — FLOWSHEET NOTE
01/08/20 1055   Encounter Summary   Services provided to: Patient not available  (patient with medical staff)

## 2020-01-09 NOTE — BRIEF OP NOTE
Brief Postoperative Note    Jonathon Dillard  YOB: 1940  [id number]    Pre-operative Diagnosis: Hypertensive urgency    Post-operative Diagnosis: Same    Procedure:Midline Placement. 4 Fr single lumen Midline placed in right upper extremity. 20 cm. May use midline.     Anesthesia: Local      Surgeons/Assistants: JOANNE HAYWARD    Estimated Blood Loss: Minimal    Complications: none    Specimens: were not obtained      Diego Wing

## 2020-01-09 NOTE — PROGRESS NOTES
Dr. Abdirahman Staton updated that the patients children both agreed to the PEG tube placement.      Electronically signed by Lexii Mohan RN on 1/9/2020 at 3:03 PM

## 2020-01-09 NOTE — PROGRESS NOTES
Daughter Judith calls me back. She and her brother Desirae Current are both in agreement for a peg tube to be placed for the patient , for her Med and nutrition needs. Will follow along for goals of care and support. Rigoberto Ritchie .[facility] LEI [place]  [facility]: [phone]  Rashmi [address]: [phone]  Work Cell: [phone]

## 2020-01-09 NOTE — PROGRESS NOTES
Writer attempted to obtain peripheral IV access, writers was unsuccessful, PCU called to see if anyone could attempt.

## 2020-01-09 NOTE — PROGRESS NOTES
 CKD (chronic kidney disease) stage 3, GFR 30-59 ml/min (HCC)    Iron deficiency anemia    TIA (transient ischemic attack)    Generalized abdominal pain    Adnexal mass    Bilateral carotid artery stenosis    Impaired gait    GI bleed    Acute deep vein thrombosis (DVT) of distal vein of lower extremity (HCC)    Occult blood positive stool    Moderate obesity    Gastroesophageal reflux disease    Mild malnutrition (HCC)    Chest pain    Elevated troponin    Hypertensive urgency    Hypokalemia    Acute cystitis without hematuria       Palliative Interaction:I had been updated by the discharge planner that the patient was refusing to eat or drink, and that her BP was very high and the only way to control it was with IV meds. GOALS OF CARE;  I reach out to the patient's daughter [facility] at [phone], and we talk about her Mom, and her refusal to eat or to take meds. I tell Ujdith that her Mom has told staff\" I just don't want to eat. \" Judith and I talk about her high BP and her nutritional status and that if we cannot control her BP that she has a high risk for stroke again, and all the affects on her quality of life. I ask Judith if she would consider a feeding tube for her Mom, so we can give her meds to control her BP and proper nutrition, since she will only take some ice cream at times. Judith states \"I know that my Mom does not eat at home either, and I want my Mom around , so yes I think we should do that. \" I ask if she is the decision maker and she says \" yes me and my brother Charmayne Pilar, and I will call and ask him too. \"     I offer Judith much emotional support. She will reach back out to me as well, and update me with her brother's thoughts as well. Will follow for goals of care and support.        Goals/Plan of care  Education/support to family  Providing support for coping/adaptation/distress of family  Continue with current plan of care  Code status clarified: Full Code  Validating patient/family distress  Continued communication updates  Talked with the patient;s daughter Sosa Mayo at [phone]. We talk about her Mom's refusal to eat or take her BP meds, along with the risks involved with uncontrolled hig BP, and a poor nutritional state. Judith states \" I think a feeding tube woudl be ok. \" Will follow for goals of care and support.      Electronically signed by   Mati Whaley RN  Palliative Care Team  on 1/9/2020 at 11:53 AM

## 2020-01-09 NOTE — PROGRESS NOTES
Patient currently waiting for midline. Writer called IR, answer received on third phone call. Writer inquired about order as patient's BP was very high. Writer was informed they were in a procedure and to call back in thirty minutes.

## 2020-01-09 NOTE — PROGRESS NOTES
Hospitalist Progress Note  1/8/2020 7:58 PM  Subjective:   Admit Date: 12/31/2019  PCP: Susie Dominguez MD     Full Code      C/c:  Chief Complaint   Patient presents with    Hypertension         Interval History: cr slightly better,urine out put better/nephro on board    Diet: DIET GENERAL; Dysphagia Pureed  Dietary Nutrition Supplements: Standard High Calorie Oral Supplement                                ip days:6  Medications:   Scheduled Meds:   hydrALAZINE  25 mg Intravenous Q8H    sodium bicarbonate  650 mg Oral TID    spironolactone  25 mg Oral Daily    sodium chloride flush  10 mL Intravenous 2 times per day    sucralfate  1 g Oral 4x Daily    polyethylene glycol  17 g Oral Nightly    carvedilol  25 mg Oral BID WC    simvastatin  40 mg Oral Nightly    pantoprazole  40 mg Oral Daily     Continuous Infusions:  PRN Meds:.acetaminophen, sodium chloride flush, potassium chloride **OR** potassium alternative oral replacement **OR** potassium chloride, magnesium sulfate, polyethylene glycol, ondansetron, nicotine polacrilex, acetaminophen, sennosides-docusate sodium, metoprolol, HYDROcodone 5 mg - acetaminophen **OR** HYDROcodone 5 mg - acetaminophen     CBC:   Recent Labs     01/06/20  0614 01/07/20  1340 01/08/20  0910   WBC 10.7 9.6 7.8   HGB 9.3* 10.5* 10.4*    246 234     BMP:    Recent Labs     01/06/20  0614 01/07/20  1340 01/08/20  0910 01/08/20  1802    145* 147* 146*   K 3.3* 3.2* 4.2 3.9   * 117* 118* 118*   CO2 17* 15* 14* 16*   BUN 24* 24* 24*  --    CREATININE 2.33* 2.23* 2.11*  --    GLUCOSE 74 83 72  --      Hepatic:   Recent Labs     01/06/20 0614   AST 15   ALT <5*   BILITOT 0.19*   ALKPHOS 43     Troponin: No results for input(s): TROPONINI in the last 72 hours. BNP: No results for input(s): BNP in the last 72 hours. Lipids: No results for input(s): CHOL, HDL in the last 72 hours.     Invalid input(s): LDLCALCU  INR: No results for input(s): INR in the last 72 hours.    Objective:   Vitals: BP (!) 175/79   Pulse 74   Temp 97.3 °F (36.3 °C) (Oral)   Resp 18   Ht 5' 2\" (1.575 m)   Wt 145 lb 1 oz (65.8 kg)   SpO2 100%   BMI 26.53 kg/m²   General appearance: alert, appears stated age and cooperative  Skin: Skin color, texture, turgor normal. No rashes or lesions  Lungs: clear to auscultation bilaterally  Heart: regular rate and rhythm, S1, S2 normal, no murmur, click, rub or gallop  Abdomen: soft, non-tender; bowel sounds normal; no masses,  no organomegaly  Extremities: extremities normal, atraumatic, no cyanosis or edema  Neurologic: Mental status: Alert, oriented, thought content appropriate    Prophylaxis:   DVT with  [] lovenox        [] heparin        [] Scd        [] none:     Radiology:  No results found. Assessment :   1. Ac kidney injury/slightly better  2. Mod malnutrition     Plan:   1. Continue present care  2. See order    Patient Active Problem List:     Chronic pancreatitis [facility])     Essential hypertension     Hyperlipidemia     Psoriasis     Gastroesophageal reflux disease without esophagitis     Bell's palsy     Chronic deep vein thrombosis (DVT) of both lower extremities (HCC)     Tobacco abuse     History of alcohol abuse     Malignant neoplasm of upper-outer quadrant of left female breast (HCC)     Obesity (BMI 30.0-34. 9)     Anemia associated with acute blood loss     Hematoma (nontraumatic) of breast     Local infection of skin and subcutaneous tissue     Pseudomonas aeruginosa infection     Anorexia     Orthostatic dizziness     Hyperkalemia     Acute kidney injury ([address]     Alcohol-induced chronic pancreatitis (HCC)     Delayed surgical wound healing     Cellulitis     Anemia     Noncompliance     CKD (chronic kidney disease) stage 3, GFR 30-59 ml/min (HCC)     Iron deficiency anemia     TIA (transient ischemic attack)     Generalized abdominal pain     Adnexal mass     Bilateral carotid artery stenosis     Impaired gait     GI bleed

## 2020-01-09 NOTE — CONSULTS
GI Consult Note:    Name: Bebeto Dillard  MRN: [id number]     Acct: [de-identified]  Room: 2045/2045-01    Admit Date: 12/31/2019  PCP: Pennie Kuhn MD    Physician Requesting Consult: Mai Ahmadi MD     Reason for Consult: Malnutrition  Patient refusing to eat for a long time  Weight loss  Multiple comorbidities  Weakness fatigue and tiredness  Dementia? Chief Complaint:     Chief Complaint   Patient presents with    Hypertension       History Obtained From:     Patient EMR    History of Present Illness:      Jeimy Johns is a  [de-identified] y.o.  female who presents with Hypertension    This elderly female patient has history significant for multiple chronic medical issues  She has been admitted multiple times in the past was evaluated for anemia  Work-up has been done by my partner had an EGD and colonoscopy done in the past  Patient is currently hospitalized with some hypertension issues  She has been noted to be refusing her food for a while  Family has requested a feeding tube placement  Patient has no overt bleeding no melena at this time  Complains of mild abdominal discomfort  She has dementia not able to give any reliable history at this time charts were reviewed and history was noted discussed with nursing staff on the floor    Symptoms:  Onset:  Location:  abdomen  Duration:  day(s)  Severity:  mild  Quality:  intermittent      Past Medical History:     Past Medical History:   Diagnosis Date    Abnormal kidney function     PT. RELATES UNAWARE OF.  Acid reflux disease     Anesthesia     'needs to be asleep when ET tube removed due  to severe gastric reflux will make me have hard time breathing. \"     Arthritis     Bell's palsy     Cancer ([facility]     Breast, lt.     Cerebral artery occlusion with cerebral infarction [facility])     Cerebrovascular disease     pt denies stroke she says she had bells palsy    COPD (chronic obstructive pulmonary disease) ([facility]     patient is unaware    Deep vein thrombosis (DVT) (HCC)     multiple    Edema     RT. LEG.  History of cancer of left breast     Hx of blood clots     dvt scott. legs.  Hyperlipidemia     Hypertension     Pancreatitis     Wears dentures     UPPER    Wears glasses         Past Surgical History:     Past Surgical History:   Procedure Laterality Date    APPENDECTOMY      BREAST LUMPECTOMY Left 07/14/2016    with sentinel lymph node dissection    CHOLECYSTECTOMY  2011    COLONOSCOPY N/A 8/1/2019    COLONOSCOPY DIAGNOSTIC performed by Santos Moreno MD at [facility]      partial    MASTECTOMY Left 09/12/2016    UT SONO GUIDE NEEDLE BIOPSY  06/2016    STOMACH SURGERY      \"MASS REMOVED ,( BENIGN)\"    TOTAL KNEE ARTHROPLASTY Right     TUBAL LIGATION      UPPER GASTROINTESTINAL ENDOSCOPY N/A 8/1/2019    EGD ESOPHAGOGASTRODUODENOSCOPY performed by Santos Moreno MD at [facility]        Medications Prior to Admission:       Prior to Admission medications    Medication Sig Start Date End Date Taking? Authorizing Provider   HYDROcodone-acetaminophen (NORCO) 5-325 MG per tablet Take 1 tablet by mouth every 8 hours as needed for Pain for up to 3 days.  1/6/20 1/9/20 Yes Nba Hernandez MD   spironolactone (ALDACTONE) 25 MG tablet Take 1 tablet by mouth daily 1/7/20  Yes Nba Hernandez MD   acetaminophen (TYLENOL) 500 MG tablet Take 500 mg by mouth every 6 hours as needed for Pain   Yes Historical Provider, MD   polyethylene glycol (GLYCOLAX) powder Take 17 g by mouth nightly   Yes Historical Provider, MD   senna-docusate (Mark Alaina) 8.6-50 MG per tablet Take 1 tablet by mouth nightly as needed for Constipation   Yes Historical Provider, MD   hydrALAZINE (APRESOLINE) 25 MG tablet Take 3 tablets by mouth every 12 hours 9/26/19  Yes Nba Hernandez MD   pantoprazole (PROTONIX) 40 MG tablet Take 1 tablet by mouth daily 8/12/19  Yes Wood Post, APRN - CNP   simvastatin (ZOCOR) 40 MG tablet Take 78 01/09/2020     PT/INR:    Lab Results   Component Value Date    PROTIME 18.8 11/08/2019    INR 1.6 11/08/2019     PTT:    Lab Results   Component Value Date    APTT 39.5 11/08/2019   [APTT}    Assesment:     Primary Problem  Hypertensive urgency    Active Hospital Problems    Diagnosis Date Noted    Acute cystitis without hematuria [N30.00] 01/04/2020    Hypertensive urgency [I16.0] 01/01/2020    Hypokalemia [E87.6] 01/01/2020    Elevated troponin [R79.89] 12/31/2019    Chest pain [R07.9] 11/18/2019    CKD (chronic kidney disease) stage 3, GFR 30-59 ml/min ([facility]) [N18.3] 06/29/2017    Chronic deep vein thrombosis (DVT) of both lower extremities ([facility] [I82.503] 06/10/2016    Essential hypertension [I10] 03/30/2016    Gastroesophageal reflux disease without esophagitis [K21.9] 03/30/2016     Malnutrition  Patient refusing to eat for a long time  Weight loss  Multiple comorbidities  Weakness fatigue and tiredness  Dementia? Plan:     1. We will plan feeding tube placement on her tomorrow  2. This was explained to the patient  3. We will discuss with the family once available  4. Discussed with nursing staff on the floor  The Endoscopic procedure was explained to the patient in detail   NPO were explained  All the Risks, Benefits, and Alternatives were explained  Risk of Bleeding, Perforation and Cardio Respiratory risks were explained  The patient has verbalized understanding and agreement to this plan. Thank you for allowing me to participate in the care of your patient. Please feel free to contact me with any questions or concerns.      Electronically signed by Emery Gunn MD on 1/9/2020 at 3:15 PM     Copy sent to Dr. Abdias Baron MD

## 2020-01-09 NOTE — PROGRESS NOTES
ISABELA continues to follow. ISABELA did speak to Fanny Alvarez at [facility] to ascertain if the feeding tube may get pt approved for skilled care. Fanny Alvarez will call in am with an answer.

## 2020-01-09 NOTE — PROGRESS NOTES
1221    Constitutional:  Awake and alert. States that she feels okay. Cardiovascular/Edema:  S1, S2 with no pericardial rub or gallops  Respiratory:  Clinically clear. Gastrointestinal:  Soft with normal bowel sounds. CBC:   Recent Labs     01/07/20  1340 01/08/20  0910   WBC 9.6 7.8   HGB 10.5* 10.4*    234     BMP:    Recent Labs     01/07/20  1340 01/08/20  0910 01/08/20  1802 01/09/20  0649   * 147* 146* 147*   K 3.2* 4.2 3.9 3.8   * 118* 118* 117*   CO2 15* 14* 16* 16*   BUN 24* 24*  --  25*   CREATININE 2.23* 2.11*  --  1.78*   GLUCOSE 83 72  --  78       Lab Results   Component Value Date    NITRU NEGATIVE 01/09/2020    COLORU YELLOW 01/09/2020    PHUR 5.0 01/09/2020    WBCUA 2 TO 5 01/09/2020    RBCUA 2 TO 5 01/09/2020    MUCUS NOT REPORTED 01/09/2020    TRICHOMONAS NOT REPORTED 01/09/2020    YEAST NOT REPORTED 01/09/2020    BACTERIA FEW 01/09/2020    SPECGRAV 1.009 01/09/2020    LEUKOCYTESUR NEGATIVE 01/09/2020    UROBILINOGEN Normal 01/09/2020    BILIRUBINUR NEGATIVE 01/09/2020    GLUCOSEU NEGATIVE 01/09/2020    KETUA NEGATIVE 01/09/2020    AMORPHOUS NOT REPORTED 01/09/2020     Urine Sodium:     Lab Results   Component Value Date    JOHN 75 01/09/2020     Urine Potassium:    Lab Results   Component Value Date    KUR 16.7 01/09/2020     Urine Chloride:    Lab Results   Component Value Date    CLUR 96 01/09/2020     Urine Creatinine:     Lab Results   Component Value Date    LABCREA 57.6 01/09/2020     IMPRESSION/RECOMMENDATIONS:      1. Acute kidney injury - consistent with prerenal azotemia. Renal function is slowly improving. Okay to place midline peak for vascular access for medication administration as well as IV fluid. 2.  Systemic hypertension - blood pressure control is suboptimal due to poor compliance from patient. Add Catapres patch #3.    3.  Hypernatremia - consistent with cellular dehydration.   Estimated free water deficit 2.2 L.    4.  Non-anion gap metabolic acidosis - top differential is renal tubular acidosis in the absence of diarrhea. Check urinary anion gap. Patient will likely need ECF placement.     ANABELA ZavalaCP  Attending Nephrologist  1/9/2020 11:50 AM

## 2020-01-09 NOTE — PROGRESS NOTES
Patient does not have IV access at this time, Dr. Tee Grant notified. Midline and clonidine patch ordered.

## 2020-01-09 NOTE — CARE COORDINATION
ONGOING DISCHARGE PLAN:    Spoke with patient regarding discharge plan and patient states she wants to go home. However patient is  not eating or taking her medications. Writer asked patient why she is not eating. She states \" I just don't want to. \" I asked patient why she doesn't want to take her medications and she states \" I don't feel like it. \"    Patient will have VNS - Arno Andry at Home. Spoke to [employer] palliative care RN and she advised she reached out to [facility] and advised of this. [facility] is agreeable to Peg tube, she will call her Brother to get his approval to. Advised RN caring for patient of this and RN was calling Dr nAgela Molina to see if a PEG tube can be done to give patient nutrition and allow for blood pressure meds to be given after discharge. Patient to get midline today. LSW was following for Long term placement, Daughter will nto pay liability fee per LSW notes, Plan is to take patient home with them. But need Patient to get nutrition and ability to give medications when patient doesn't want to eat. Will continue to follow for additional discharge needs.     Electronically signed by Loyal Phoenix, RN on 1/9/2020 at 12:10 PM

## 2020-01-09 NOTE — FLOWSHEET NOTE
01/08/20 1930   Encounter Summary   Services provided to: Patient   Referral/Consult From: Palliative Care   Complexity of Encounter Low   Length of Encounter 15 minutes   Spiritual/Oriental orthodox   Type Spiritual support   Assessment Sleeping   Intervention Prayer   Outcome Did not respond   Visited by Pastor Vaughn

## 2020-01-09 NOTE — PROGRESS NOTES
Safety maintained. Call light reachable.  Electronically signed by Dwyane Castleman, RN on 1/9/2020 at 7:27 AM

## 2020-01-10 NOTE — PROGRESS NOTES
Physical Therapy  DATE: 2020  NAME: María Dillard  MRN: [id number]    : 1940     Discharge Recommendations: Further Physical Therapy is recommended upon facility discharge. Family interested in [address] per chart.     Subjective: Patient pleasant throughout treatment. Pain: Denies pain  Patient follows: Most Commands  Is patient on ventilator: No  Is patient on sedation: No  Restrictions/Precautions  Restrictions/Precautions: Fall Risk(peripheral IV right hand, external urinary catheter)   Midline placement 2020   Required Braces or Orthoses?: No  Implants present? : Metal implants(right TKR)  Position Activity Restriction  Other position/activity restrictions:  NO BP OR LAB DRAWS LEFT ARM due to left breast mastectomy, up as tolerated, pureed diet     Therapeutic exercises:  UE/LE(s)  Right UE/LE Active-assist range of motion; PROM LLE/Passive stretching LUE all planes x 15 reps  bilateral gastrocnemius stretching 3 reps x 20 seconds  Comment: Facial grimace with LLE PROM       Goals  Short Term Goals  Short term goal 1:  pt to tolerate 1/2 hour of therapuetic exercise and activity  Short term goal 2:  pt to demonstrate good technique for LE left PROM and right LE AROM/ strengthening   Short term goal 3:  Pt able to roll side to side in bed at max x 2  to left and max assist x 2 to reposition using rail to the right  Short term goal 4: instruct family if present for ROM,  rolling and turning schedule for positioning- family to demonstrate good technique     Plan: Progress functional mobility as medically appropriate.    Time In:   Time Out: 1012  Time Coded Minutes (treatment minutes): 17  Rehab Potential: Fair  Treatments/week: 3-5  treatments/ week     Keely Leong PTA

## 2020-01-10 NOTE — FLOWSHEET NOTE
Patient scheduled for PEG tube insertion today. Unable to locate consent. PC to  Dr. Mary Lou Callahan, he spoke with nurse yesterday and was under the impression consent had been obtained. Explained to Dr. Mary Lou Callahan that nurse had spoken with family and they are agreeable but he must obtain consent over the phone for his procedure, I cannot obtain informed consent. Unable to reach family per phone, procedure cancelled for today.

## 2020-01-10 NOTE — PROGRESS NOTES
eat  She did not appear in pain no tachypnea, no tachycardia    Potassium 3.6, creatinine 1.65    Medications:   Scheduled Meds:   cloNIDine  1 patch Transdermal Weekly    hydrALAZINE  25 mg Intravenous Q8H    sodium bicarbonate  650 mg Oral TID    sodium chloride flush  10 mL Intravenous 2 times per day    sucralfate  1 g Oral 4x Daily    polyethylene glycol  17 g Oral Nightly    carvedilol  25 mg Oral BID WC    simvastatin  40 mg Oral Nightly    pantoprazole  40 mg Oral Daily     Continuous Infusions:   IV infusion builder 75 mL/hr at 01/10/20 0429     CBC:   Recent Labs     01/08/20  0910   WBC 7.8   HGB 10.4*        BMP:    Recent Labs     01/08/20  0910 01/08/20  1802 01/09/20  0649 01/10/20  0636   * 146* 147* 147*   K 4.2 3.9 3.8 3.6*   * 118* 117* 117*   CO2 14* 16* 16* 18*   BUN 24*  --  25* 25*   CREATININE 2.11*  --  1.78* 1.65*   GLUCOSE 72  --  78 140*     Hepatic: No results for input(s): AST, ALT, ALB, BILITOT, ALKPHOS in the last 72 hours. Troponin: No results for input(s): TROPONINI in the last 72 hours. BNP: No results for input(s): BNP in the last 72 hours. Lipids: No results for input(s): CHOL, HDL in the last 72 hours. Invalid input(s): LDLCALCU  INR: No results for input(s): INR in the last 72 hours. Objective:   Vitals: BP (!) 181/73   Pulse 70   Temp 98.2 °F (36.8 °C) (Axillary)   Resp 16   Ht 5' 2\" (1.575 m)   Wt 151 lb 3.8 oz (68.6 kg)   SpO2 97%   BMI 27.66 kg/m²   General appearance: Elderly frail looking female  HEENT: Head: Normal, normocephalic, atraumatic. Neck: Neck is supple  Lungs: dullness to percussion bibasilar  Heart: regular rate and rhythm  Abdomen: Abdomen is soft, bowel sounds present  Extremities: Bilateral lymphedema  Neurologic: Mental status: Open eyes to verbal command    EKG: normal sinus rhythm, unchanged from previous tracings. ECHO: reviewed. Ejection fraction: 55%  Stress Test: not obtained.   Cardiac Angiography: not obtained. Assessment / Acute Cardiac Problems:   Patient admitted with accelerated hypertension  Constant chest pain, chest wall tenderness, does not appears in pain  Borderline abnormal high-sensitivity troponin  Patient is mostly nonverbal  Normal LV systolic function, normal wall motion 2D echo 9/25/2019  History of CVA, left hemiplegia, Bell's palsy  History of DVT, IVC filter  History of GI bleeding  Bilateral lymphedema  UTI    1/10/2020 patient waiting for PEG tube placement because she is not taking orally well. Improvement in the serum creatinine with IV fluid    Patient Active Problem List:     Chronic pancreatitis ([address]     Essential hypertension     Hyperlipidemia     Psoriasis     Gastroesophageal reflux disease without esophagitis     Bell's palsy     Chronic deep vein thrombosis (DVT) of both lower extremities (HCC)     Tobacco abuse     History of alcohol abuse     Malignant neoplasm of upper-outer quadrant of left female breast (HCC)     Obesity (BMI 30.0-34. 9)     Anemia associated with acute blood loss     Hematoma (nontraumatic) of breast     Local infection of skin and subcutaneous tissue     Pseudomonas aeruginosa infection     Anorexia     Orthostatic dizziness     Hyperkalemia     Acute kidney injury ([address]     Alcohol-induced chronic pancreatitis (HCC)     Delayed surgical wound healing     Cellulitis     Anemia     Noncompliance     CKD (chronic kidney disease) stage 3, GFR 30-59 ml/min (HCC)     Iron deficiency anemia     TIA (transient ischemic attack)     Generalized abdominal pain     Adnexal mass     Bilateral carotid artery stenosis     Impaired gait     GI bleed     Acute deep vein thrombosis (DVT) of distal vein of lower extremity ([facility])     Occult blood positive stool     Moderate obesity     Gastroesophageal reflux disease     Mild malnutrition (HCC)     Chest pain     Elevated troponin     Hypertensive urgency     Hypokalemia     Acute cystitis without hematuria Moderate protein-calorie malnutrition ([facility]     Weight loss      Plan of Treatment:   Medication check  Agree with current management    Electronically signed by Doug Moreau MD on 1/10/2020 at 4:37 PM

## 2020-01-10 NOTE — FLOWSHEET NOTE
01/10/20 1730   Encounter Summary   Services provided to: Patient   Referral/Consult From: Palliative Care   Complexity of Encounter Low   Length of Encounter 15 minutes   Spiritual/Advent   Type Spiritual support   Assessment Sleeping   Intervention Prayer   Outcome Did not respond

## 2020-01-10 NOTE — PROGRESS NOTES
GI Progress notes    1/10/2020   2:01 PM    Name:  Lynn Dillard  MRN:    [id number]     Acct:     [de-identified]   Room:  2045/204501   Day: 8     Admit Date: 12/31/2019 12:23 PM  PCP: David Bowles MD    Subjective:     C/C:   Chief Complaint   Patient presents with    Hypertension       Interval History: Status: not changed. Patient seen and examined. Was to have PEG tube placed today but patient's family was not available to give consent. No abdominal pain, nausea, vomiting. Labs and progress notes reviewed. ROS:  Patient lethargic, hx dementia/  Not able to give reliable information at present    Medications: Allergies:    Allergies   Allergen Reactions    Latex Hives    Asa [Aspirin] Nausea Only     Stomach pain       Current Meds: hydrALAZINE (APRESOLINE) injection 20 mg, Q6H PRN  cloNIDine (CATAPRES) 0.3 MG/24HR 1 patch, Weekly  sodium bicarbonate 50 mEq in dextrose 5 % 1,000 mL infusion, Continuous  hydrALAZINE (APRESOLINE) injection 25 mg, Q8H  sodium bicarbonate tablet 650 mg, TID  acetaminophen (TYLENOL) suppository 650 mg, Q4H PRN  sodium chloride flush 0.9 % injection 10 mL, 2 times per day  sodium chloride flush 0.9 % injection 10 mL, PRN  polyethylene glycol (GLYCOLAX) packet 17 g, Daily PRN  ondansetron (ZOFRAN) injection 4 mg, Q6H PRN  nicotine polacrilex (NICORETTE) gum 2 mg, Q2H PRN  acetaminophen (TYLENOL) tablet 650 mg, Q4H PRN  sennosides-docusate sodium (SENOKOT-S) 8.6-50 MG tablet 1 tablet, Nightly PRN  sucralfate (CARAFATE) tablet 1 g, 4x Daily  polyethylene glycol (GLYCOLAX) packet 17 g, Nightly  carvedilol (COREG) tablet 25 mg, BID WC  simvastatin (ZOCOR) tablet 40 mg, Nightly  pantoprazole (PROTONIX) tablet 40 mg, Daily  HYDROcodone-acetaminophen (NORCO) 5-325 MG per tablet 1 tablet, Q4H PRN    Or  HYDROcodone-acetaminophen (NORCO) 5-325 MG per tablet 2 tablet, Q4H PRN        Data:     Code Status:  Full Code    Family History   Problem Relation Age of Onset    High Blood Pressure Mother     Heart Disease Mother     Other Father         TB       Social History     Socioeconomic History    Marital status:      Spouse name: Not on file    Number of children: Not on file    Years of education: Not on file    Highest education level: Not on file   Occupational History    Not on file   Social Needs    Financial resource strain: Not on file    Food insecurity:     Worry: Not on file     Inability: Not on file    Transportation needs:     Medical: Not on file     Non-medical: Not on file   Tobacco Use    Smoking status: Former Smoker     Packs/day: 1.00     Years: 54.00     Pack years: 54.00     Last attempt to quit: 2019     Years since quittin.2    Smokeless tobacco: Never Used   Substance and Sexual Activity    Alcohol use: Not Currently    Drug use: No    Sexual activity: Not on file   Lifestyle    Physical activity:     Days per week: Not on file     Minutes per session: Not on file    Stress: Not on file   Relationships    Social connections:     Talks on phone: Not on file     Gets together: Not on file     Attends Yarsani service: Not on file     Active member of club or organization: Not on file     Attends meetings of clubs or organizations: Not on file     Relationship status: Not on file    Intimate partner violence:     Fear of current or ex partner: Not on file     Emotionally abused: Not on file     Physically abused: Not on file     Forced sexual activity: Not on file   Other Topics Concern    Not on file   Social History Narrative    Not on file       Vitals:  BP (!) 155/82   Pulse 71   Temp 98.2 °F (36.8 °C) (Axillary)   Resp 20   Ht 5' 2\" (1.575 m)   Wt 151 lb 3.8 oz (68.6 kg)   SpO2 97%   BMI 27.66 kg/m²   Temp (24hrs), Av.9 °F (36.6 °C), Min:97.5 °F (36.4 °C), Max:98.2 °F (36.8 °C)    Recent Labs     20  1121   POCGLU 71       I/O (24Hr):     Intake/Output Summary (Last 24 hours) at 1/10/2020 Examination:        General appearance: alert, cooperative and no distress  Mental Status: oriented to person, place and time and normal affect  Abdomen: soft, nontender, nondistended, bowel sounds present   Assessment:        Primary Problem  Hypertensive urgency     Active Hospital Problems    Diagnosis Date Noted    Moderate protein-calorie malnutrition ([facility] [E44.0]     Weight loss [R63.4]     Acute cystitis without hematuria [N30.00] 01/04/2020    Hypertensive urgency [I16.0] 01/01/2020    Hypokalemia [E87.6] 01/01/2020    Elevated troponin [R79.89] 12/31/2019    Chest pain [R07.9] 11/18/2019    CKD (chronic kidney disease) stage 3, GFR 30-59 ml/min ([facility]) [N18.3] 06/29/2017    Chronic deep vein thrombosis (DVT) of both lower extremities ([facility] [I82.503] 06/10/2016    Essential hypertension [I10] 03/30/2016    Gastroesophageal reflux disease without esophagitis [K21.9] 03/30/2016     Past Medical History:   Diagnosis Date    Abnormal kidney function     PT. RELATES UNAWARE OF.  Acid reflux disease     Anesthesia     'needs to be asleep when ET tube removed due  to severe gastric reflux will make me have hard time breathing. \"     Arthritis     Bell's palsy     Cancer ([facility]     Breast, lt.  Cerebral artery occlusion with cerebral infarction [facility])     Cerebrovascular disease     pt denies stroke she says she had bells palsy    COPD (chronic obstructive pulmonary disease) ([facility]     patient is unaware    Deep vein thrombosis (DVT) ([facility]     multiple    Edema     RT. LEG.  History of cancer of left breast     Hx of blood clots     dvt scott. legs.  Hyperlipidemia     Hypertension     Pancreatitis     Wears dentures     UPPER    Wears glasses         Plan:        1. Malnutrition, weight loss  1. Plan for PEG  2. Need to obtain consent  3. We will discuss with the family once available  4. Discussed with nursing staff    Patient seen and examined along with Dr. Johnie Mcdaniel.   Management plan

## 2020-01-10 NOTE — PROGRESS NOTES
Nutrition Assessment    Type and Reason for Visit: Reassess    Nutrition Recommendations: Patient will be NPO after midnight for PEG tube placement. Nutrition Assessment: Patient has not improved from a nutritional perspective as evidenced by continued minimal oral intake. Patient to have PEG tube placement done tomorrow, monitor for start of tube feedings. Malnutrition Assessment:  · Malnutrition Status: Meets the criteria for severe malnutrition  · Context: Chronic illness  · Findings of the 6 clinical characteristics of malnutrition (Minimum of 2 out of 6 clinical characteristics is required to make the diagnosis of moderate or severe Protein Calorie Malnutrition based on AND/ASPEN Guidelines):  1. Energy Intake-Less than or equal to 50% of estimated energy requirement, Greater than or equal to 3 months    2. Weight Loss-7.5% loss or greater, (5 months)  3. Fat Loss-Moderate subcutaneous fat loss, Triceps  4. Muscle Loss-Unable to assess,    5. Fluid Accumulation-Moderate to severe fluid accumulation, Extremities  6.   Strength-Not measured    Nutrition Risk Level: High    Nutrient Needs:  · Estimated Daily Total Kcal: 1300 kcal based on [place] with 1.2 factor ( 49.9 kg)  · Estimated Daily Protein (g): 60-65 gm of protein based on 1.2-1.3 gm/kg of ideal weight     Nutrition Diagnosis:   · Problem: Inadequate oral intake  · Etiology: related to Insufficient energy/nutrient consumption(altered mental status)     Signs and symptoms:  as evidenced by Diet history of poor intake, Weight loss    Objective Information:  · Nutrition-Focused Physical Findings: +1 generalized edema, +1 BUE, +3 RUE, +2 LLE edema  · Wound Type: (medial back)  · Current Nutrition Therapies:  · Oral Diet Orders: General, Dysphagia Pureed (Dysphagia 1)   · Oral Diet intake: Refusing to eat, 0%, 1-25%  · Oral Nutrition Supplement (ONS) Orders: Standard High Calorie Oral Supplement  · ONS intake: Refused  · Anthropometric

## 2020-01-10 NOTE — PROGRESS NOTES
Department of Internal Medicine  Nephrology Gauri Morales MD    Progress Note    Interval history: Patient is awake today and slightly more responsive. She had a midline PICC placed yesterday for hydration as well as medication administration. Blood pressure remains suboptimally controlled however. She is not in respiratory distress and is afebrile. History of present illness: This is a [age] [facility] y.o. female With history of hypertension, CVA with left hemiplegia, History of DVT not presently on anticoagulation with fairly preserved renal function serum creatinine of 0.5 MG/DL at baseline. She presented to the hospital with uncontrolled hypertension after her home health aide noticed that her blood pressures were elevated. She has had admission in December for chest pain and was seen by cardiology and on presentation on this visit he had intermittent chest pain. Her blood pressures have been labile with a BP of 192/65 yesterday. Is followed by cardiology and currently is on hydralazine 25 mg twice daily, carvedilol 25 mg twice daily and Aldactone 25 mg daily. She had been on diuretics and ciprofloxacin early in her admission  During the course of admission her and jaime from 0.8 on admission to a peak of 2.33 mg/dL on 1/6/2020. There was no significant drop in the blood pressure noted. She has been placed on IV fluids due to rising creatinine but this was discontinued yesterday. He has been no documented evidence of vomiting diarrhea but her appetite has been suboptimal..She does have a metabolic acidosis with bicarbonate of 14. No episode of diarrhea. Serum albumin is 2.3. She has chronic lymphedema of bilateral lower extremities and left upper extremity.     Physical Exam:    VITALS:  BP (!) 173/82   Pulse 68   Temp 97.7 °F (36.5 °C) (Oral)   Resp 16   Ht 5' 2\" (1.575 m)   Wt 151 lb 3.8 oz (68.6 kg)   SpO2 95%   BMI 27.66 kg/m²   24HR INTAKE/OUTPUT:      Intake/Output Summary (Last 24 hours) at and start IV Hydralazine 20 mg every 6 hours for systolic blood pressure greater than 160 mmHg. 3.  Hypernatremia - consistent with cellular dehydration. Estimated free water deficit 2.2 L. Plan is to place PEG tube today and we will start free water per  mL 4 times a day. 4.  Non-anion gap metabolic acidosis - top differential is renal tubular acidosis in the absence of diarrhea. Check urinary anion gap. Patient will likely need ECF placement.     ANABELA BernsteinCP  Attending Nephrologist  1/10/2020 1:25 PM

## 2020-01-11 NOTE — PLAN OF CARE
Problem: Falls - Risk of:  Goal: Will remain free from falls  Description  Will remain free from falls  1/11/2020 0553 by Dennie Prude, RN  Outcome: Met This Shift  1/10/2020 1932 by Dulce Barba RN  Outcome: Ongoing  Goal: Absence of physical injury  Description  Absence of physical injury  1/11/2020 0553 by Dennie Prude, RN  Outcome: Met This Shift  1/10/2020 1932 by Dulce Barba RN  Outcome: Ongoing     Problem: Risk for Impaired Skin Integrity  Goal: Tissue integrity - skin and mucous membranes  Description  Structural intactness and normal physiological function of skin and  mucous membranes. 1/11/2020 0553 by Dennie Prude, RN  Outcome: Met This Shift  1/10/2020 1932 by Dulce Barba RN  Outcome: Ongoing     Problem: Pain:  Goal: Pain level will decrease  Description  Pain level will decrease  1/11/2020 0553 by Dennie Prude, RN  Outcome: Met This Shift  1/10/2020 1932 by Dulce Barba RN  Outcome: Ongoing  Goal: Control of acute pain  Description  Control of acute pain  1/11/2020 0553 by Dennie Prude, RN  Outcome: Met This Shift  1/10/2020 1932 by Dulce Barba RN  Outcome: Ongoing  Goal: Control of chronic pain  Description  Control of chronic pain  1/11/2020 0553 by Dennie Prude, RN  Outcome: Met This Shift  1/10/2020 1932 by Dulce Barba RN  Outcome: Ongoing     Problem: Musculor/Skeletal Functional Status  Goal: Highest potential functional level  1/11/2020 0553 by Dennie Prude, RN  Outcome: Met This Shift  1/10/2020 1932 by Dulce Barba RN  Outcome: Ongoing  Goal: Absence of falls  1/11/2020 0553 by Dennie Prude, RN  Outcome: Met This Shift  1/10/2020 1932 by Dulce Barba RN  Outcome: Ongoing     Problem: Nutrition  Goal: Optimal nutrition therapy  1/11/2020 0553 by Dennie Prude, RN  Outcome: Met This Shift  1/10/2020 1932 by Dulce Barba RN  Outcome: Ongoing  1/10/2020 1648 by Sachi Anthony RD, LD  Outcome: Ongoing   Pt stable. No distress.  Educated on staff, safety,

## 2020-01-11 NOTE — PROGRESS NOTES
[facility]    Gastroenterology Daily Progress Note      Patient:   María Dillard   :    1940   Facility:   [facility]  Date:     2020  Consultant:   Candace Trevino CNP      SUBJECTIVE  [de-identified] y.o. female admitted 2019 with Elevated troponin [R79.89]  Elevated troponin [R79.89] and seen for malnutrition. The pt was seen and examined. No family is at the bedside. Pt is awake disoriented to place. No c/o abdominal pain or nausea. OBJECTIVE  Scheduled Meds:   cloNIDine  1 patch Transdermal Weekly    hydrALAZINE  25 mg Intravenous Q8H    sodium bicarbonate  650 mg Oral TID    sodium chloride flush  10 mL Intravenous 2 times per day    sucralfate  1 g Oral 4x Daily    polyethylene glycol  17 g Oral Nightly    carvedilol  25 mg Oral BID WC    simvastatin  40 mg Oral Nightly    pantoprazole  40 mg Oral Daily       Vital Signs:  BP (!) 183/79   Pulse 67   Temp 98.2 °F (36.8 °C) (Oral)   Resp 20   Ht 5' 2\" (1.575 m)   Wt 151 lb 3.8 oz (68.6 kg)   SpO2 97%   BMI 27.66 kg/m²        General Appearance: alert and oriented to person,  disoriented to place well-developed and well-nourished, in no acute distress  Skin: warm and dry, no rash or erythema  Head: normocephalic and atraumatic  Eyes: pupils equal, round, and reactive to light, extraocular eye movements intact, conjunctivae normal  ENT: hearing grossly normal bilaterally  Neck: neck supple and non tender without mass, no thyromegaly or thyroid nodules, no cervical lymphadenopathy   Pulmonary/Chest: clear to auscultation bilaterally- no wheezes, rales or rhonchi, normal air movement, no respiratory distress  Cardiovascular: normal rate, regular rhythm, normal S1 and S2, no murmurs, rubs, clicks or gallops, distal pulses intact, no carotid bruits  Abdomen: soft, non-tender, non-distended, normal bowel sounds, no masses or organomegaly.  Lower midline scar  Extremities: no cyanosis, clubbing or

## 2020-01-11 NOTE — PROGRESS NOTES
No visitors or family into visit this shift. No consent obtained for possible peg placement this shift.  Electronically signed by Sundeep Alfaro RN on 1/11/2020 at 6:40 AM

## 2020-01-11 NOTE — CARE COORDINATION
ONGOING DISCHARGE PLAN:    Spoke with patient regarding discharge plan and patient confirms that plan is still to return home with [facility]. LSW is also following and made referral today to [facility]. PEG placement planned tomorrow. PT recommends continued therapy. ROSI IS SIGNED AND NEEDS COMPLETED. Will continue to follow for additional discharge needs.     Electronically signed by Suzanne Montaño RN on 1/11/2020 at 3:29 PM

## 2020-01-11 NOTE — PROGRESS NOTES
Physical Therapy  [address] [facility]    Physical Therapy Progress Note    Date: 20  Patient Name: Daniel Dillard       Room:   MRN: [id number]   Account: [de-identified]   : 1940  ([de-identified] y.o.)   Gender: female     Discharge Recommendations   Patient would benefit from continued therapy after discharge  Equipment Needed: (TBD? , family interested in lift system per chart)    Referring Practitioner: Dr Negrete Organ   Diagnosis: Hypertensive Urgency   Restrictions/Precautions: Fall Risk(peripheral IV right hand, external urinary catheter, telesitter, troponins 61 on 19)  Implants present? : Metal implants(right TKR)  Other position/activity restrictions:  NO BP OR LAB DRAWS LEFT ARM due to left breast mastectomy, up as tolerated, pureed diet   Past Medical History:  has a past medical history of Abnormal kidney function, Acid reflux disease, Anesthesia, Arthritis, Bell's palsy, Cancer ([address] Cerebral artery occlusion with cerebral infarction ([address] Cerebrovascular disease, COPD (chronic obstructive pulmonary disease) ([address] Deep vein thrombosis (DVT) ([address] Edema, History of cancer of left breast, Hx of blood clots, Hyperlipidemia, Hypertension, Pancreatitis, Wears dentures, and Wears glasses. Past Surgical History:   has a past surgical history that includes Cholecystectomy (); Hysterectomy; Stomach surgery; Total knee arthroplasty (Right); Tubal ligation; Appendectomy; Breast lumpectomy (Left, 2016); pr sono guide needle biopsy (2016); Mastectomy (Left, 2016); Upper gastrointestinal endoscopy (N/A, 2019); and Colonoscopy (N/A, 2019).   Additional Pertinent Hx: hx CVA w/ left hemiplegia, hx chronic Bilateral LE  lymphedema    Overall Orientation Status: Impaired  Orientation Level: Oriented to person, Disoriented to place, Disoriented to time, Disoriented to situation  Restrictions/Precautions  Restrictions/Precautions: Fall Risk(peripheral IV right right  Short term goal 4: instruct family if present for ROM,  rolling and turning schedule for positioning- family to demonstrate good technique    PT Individual Minutes  Time In: (P) 0756  Time Out: (P) 0813  Minutes: (P) 17    Electronically signed by Jacquelene Skiff, PTA on 1/11/20 at 8:13 AM         01/11/20 0812   PT Individual Minutes   Time In 0756   Time Out 0813   Minutes 16

## 2020-01-11 NOTE — PROGRESS NOTES
metabolic acidosis - top differential is renal tubular acidosis in the absence of diarrhea. Check urinary anion gap. Patient will likely need ECF placement.     Bethene Hammans, MD  Attending Nephrologist  1/11/2020 6:29 PM

## 2020-01-11 NOTE — PROGRESS NOTES
157/91   Pulse 65   Temp 97.2 °F (36.2 °C) (Oral)   Resp 18   Ht 5' 2\" (1.575 m)   Wt 151 lb 3.8 oz (68.6 kg)   SpO2 96%   BMI 27.66 kg/m²   General appearance: alert, appears stated age and cooperative  Skin: Skin color, texture, turgor normal. No rashes or lesions  Lungs: clear to auscultation bilaterally  Heart: regular rate and rhythm, S1, S2 normal, no murmur, click, rub or gallop  Abdomen: soft, non-tender; bowel sounds normal; no masses,  no organomegaly  Extremities: extremities normal, atraumatic, no cyanosis or edema  Neurologic: Mental status: Alert, oriented, thought content appropriate    Prophylaxis:   DVT with  [] lovenox        [] heparin        [] Scd        [] none:     Radiology:  No results found. Assessment :   1. Severe malnutrition/for peg placement  2. Htn  3. Ac renal failure/better       Plan:   1. See order        Patient Active Problem List:     Chronic pancreatitis [facility])     Essential hypertension     Hyperlipidemia     Psoriasis     Gastroesophageal reflux disease without esophagitis     Bell's palsy     Chronic deep vein thrombosis (DVT) of both lower extremities (HCC)     Tobacco abuse     History of alcohol abuse     Malignant neoplasm of upper-outer quadrant of left female breast (HCC)     Obesity (BMI 30.0-34. 9)     Anemia associated with acute blood loss     Hematoma (nontraumatic) of breast     Local infection of skin and subcutaneous tissue     Pseudomonas aeruginosa infection     Anorexia     Orthostatic dizziness     Hyperkalemia     Acute kidney injury ([address]     Alcohol-induced chronic pancreatitis (HCC)     Delayed surgical wound healing     Cellulitis     Anemia     Noncompliance     CKD (chronic kidney disease) stage 3, GFR 30-59 ml/min (HCC)     Iron deficiency anemia     TIA (transient ischemic attack)     Generalized abdominal pain     Adnexal mass     Bilateral carotid artery stenosis     Impaired gait     GI bleed     Acute deep vein thrombosis (DVT) of distal vein of lower extremity (HCC)     Occult blood positive stool     Moderate obesity     Gastroesophageal reflux disease     Mild malnutrition (HCC)     Chest pain     Elevated troponin     Hypertensive urgency     Hypokalemia     Acute cystitis without hematuria     Moderate protein-calorie malnutrition ([address]     Weight loss      Anticipated Disposition upon discharge: [] Home                                                                         [] Home with Home Health                                                                         [] [facility]                                                                         [] [address]      Patient is admitted as inpatient status because of co-morbidities listed above, severity of signs and symptoms as outlined, requirement for current medical therapies and most importantly because of direct risk to patient if care not provided in a hospital setting.           Hermila Miller MD  Rounding Hospitalist

## 2020-01-11 NOTE — PLAN OF CARE
Problem: Falls - Risk of:  Goal: Will remain free from falls  Description  Will remain free from falls  1/10/2020 1932 by Tianna Samayoa RN  Outcome: Ongoing  1/10/2020 0556 by Ebonie Vinson RN  Outcome: Met This Shift  Goal: Absence of physical injury  Description  Absence of physical injury  1/10/2020 1932 by Tianna Samayoa RN  Outcome: Ongoing  1/10/2020 0556 by Ebonie Vinson RN  Outcome: Met This Shift     Problem: Risk for Impaired Skin Integrity  Goal: Tissue integrity - skin and mucous membranes  Description  Structural intactness and normal physiological function of skin and  mucous membranes.   1/10/2020 1932 by Tianna Samayoa RN  Outcome: Ongoing  1/10/2020 0556 by Ebonie Vinson RN  Outcome: Met This Shift     Problem: Pain:  Goal: Pain level will decrease  Description  Pain level will decrease  1/10/2020 1932 by Tianna Samayoa RN  Outcome: Ongoing  1/10/2020 0556 by Ebonie Vinson RN  Outcome: Met This Shift  Goal: Control of acute pain  Description  Control of acute pain  1/10/2020 1932 by Tianna Samayoa RN  Outcome: Ongoing  1/10/2020 0556 by Ebonie Vinson RN  Outcome: Met This Shift  Goal: Control of chronic pain  Description  Control of chronic pain  1/10/2020 1932 by Tianna Samayoa RN  Outcome: Ongoing  1/10/2020 0556 by Ebonie Vinson RN  Outcome: Met This Shift     Problem: Musculor/Skeletal Functional Status  Goal: Highest potential functional level  1/10/2020 1932 by Tianna Samayoa RN  Outcome: Ongoing  1/10/2020 0556 by Ebonie Vinson RN  Outcome: Met This Shift  Goal: Absence of falls  1/10/2020 1932 by Tianna Samayoa RN  Outcome: Ongoing  1/10/2020 0556 by Ebonie Vinson RN  Outcome: Met This Shift     Problem: Nutrition  Goal: Optimal nutrition therapy  1/10/2020 1932 by Tianna Samayoa RN  Outcome: Ongoing  1/10/2020 1648 by Arline Robertson RD, LD  Outcome: Ongoing  1/10/2020 0556 by Ebonie Vinson RN  Outcome: Met This Shift

## 2020-01-11 NOTE — FLOWSHEET NOTE
01/11/20 1112   Encounter Summary   Services provided to: Patient   Referral/Consult From: Palliative Care   Complexity of Encounter Low   Length of Encounter 15 minutes   Spiritual/Orthodoxy   Type Spiritual support   Assessment Sleeping   Intervention Prayer   Outcome Did not respond

## 2020-01-11 NOTE — PROGRESS NOTES
Safety maintained. Call light reachable.  Electronically signed by Alejandro Peabody, RN on 1/11/2020 at 7:01 AM

## 2020-01-12 PROBLEM — A63.0 CONDYLOMA ACUMINATUM IN FEMALE: Status: ACTIVE | Noted: 2020-01-01

## 2020-01-12 NOTE — PROGRESS NOTES
Progress Note    1/12/2020   7:58 AM    Name:  Chad Dillard  MRN:    [id number]     Acct:     [de-identified]   Room:  204/2045-01   Day: 8     Admit Date: 12/31/2019 12:23 PM  PCP: Jj Rolon MD    Subjective:     C/C:   Chief Complaint   Patient presents with    Hypertension       Interval History: Status: not changed. patient denies any abdominal pain    ROS:   all 10 systems reviewed and are negative except as noted    Review of Systems   Constitutional: Negative for appetite change and fatigue. HENT: Negative for congestion, postnasal drip and sore throat. Eyes: Negative for pain and discharge. Respiratory: Negative for apnea, chest tightness and stridor. Cardiovascular: Negative for palpitations. Gastrointestinal: Negative for anal bleeding and rectal pain. Endocrine: Negative for polydipsia and polyphagia. Genitourinary: Negative for decreased urine volume, flank pain and hematuria. Musculoskeletal: Negative for joint swelling and neck stiffness. Skin: Negative for rash. Allergic/Immunologic: Negative for food allergies. Neurological: Negative for seizures, facial asymmetry and speech difficulty. Hematological: Does not bruise/bleed easily. Psychiatric/Behavioral: Negative for behavioral problems and suicidal ideas. The patient is not hyperactive. Medications: Allergies:    Allergies   Allergen Reactions    Latex Hives    Asa [Aspirin] Nausea Only     Stomach pain       Current Meds: hydrALAZINE (APRESOLINE) injection 20 mg, Q4H PRN  cloNIDine (CATAPRES) 0.3 MG/24HR 1 patch, Weekly  sodium bicarbonate 50 mEq in dextrose 5 % 1,000 mL infusion, Continuous  hydrALAZINE (APRESOLINE) injection 25 mg, Q8H  sodium bicarbonate tablet 650 mg, TID  acetaminophen (TYLENOL) suppository 650 mg, Q4H PRN  sodium chloride flush 0.9 % injection 10 mL, 2 times per day  sodium chloride flush 0.9 % injection 10 mL, PRN  polyethylene glycol (GLYCOLAX) packet 17 g, Daily Ht 5' 2\" (1.575 m)   Wt 154 lb 8.7 oz (70.1 kg)   SpO2 97%   BMI 28.27 kg/m²   Temp (24hrs), Av.4 °F (36.3 °C), Min:97.3 °F (36.3 °C), Max:97.7 °F (36.5 °C)    No results for input(s): POCGLU in the last 72 hours. Physical Exam  Vitals signs reviewed. Constitutional:       Appearance: She is well-developed. HENT:      Head: Normocephalic and atraumatic. Nose: Nose normal.   Eyes:      General: Lids are normal.   Neck:      Trachea: No tracheal deviation. Cardiovascular:      Rate and Rhythm: Normal rate and regular rhythm. Heart sounds: Normal heart sounds, S1 normal and S2 normal.   Pulmonary:      Effort: Pulmonary effort is normal. No respiratory distress. Breath sounds: Normal breath sounds. Abdominal:      General: Bowel sounds are normal. There is no distension. Palpations: Abdomen is soft. Tenderness: There is no tenderness. There is no guarding. Musculoskeletal:         General: No tenderness or deformity. Lymphadenopathy:      Cervical: No cervical adenopathy. Upper Body:      Right upper body: No supraclavicular or epitrochlear adenopathy. Left upper body: No supraclavicular or epitrochlear adenopathy. Skin:     General: Skin is warm and dry. Capillary Refill: Capillary refill takes less than 2 seconds. Neurological:      Mental Status: She is alert. Mental status is at baseline. Motor: Weakness present. No abnormal muscle tone or seizure activity.    Psychiatric:         Speech: Speech normal.         Behavior: Behavior normal.         Judgment: Judgment normal.         Assessment:        Primary Problem  Hypertensive urgency     Principal Problem:    Hypertensive urgency  Active Problems:    Essential hypertension    Gastroesophageal reflux disease without esophagitis    Chronic deep vein thrombosis (DVT) of both lower extremities (HCC)    CKD (chronic kidney disease) stage 3, GFR 30-59 ml/min ([facility])    Chest pain    Elevated

## 2020-01-12 NOTE — CONSULTS
she still has a uterus, tubes and ovaries but surgical history documented with history of \"partial hysterectomy\". No LMP recorded. Patient has had a hysterectomy per surgical history tab. TVUS on 7/29/2019 confirmed surgically absent uterus. Left ovary at that time revealed a hypoechoic solid mass measuring 3.9 x 2.6 x 4cm with no evidence of free fluid. Right ovary was not seen. This was compared with a CT on 9/22/2015 which revealed the left ovarian lesion had increased in size slightly from 2.8 x 3.4 to 3.4 x 3.6 cm in size. Nonemergent pelvic MRI without and with contrast was  recommended to exclude underlying neoplasm. Repeat TVUS was ordered in 12/2019 outpatient to follow the left adnexal mass. Her primary care provider is Dr. Praful Riddle MD.    There is no family present in room for confirmation. She reports history of one child, but RN reports that she has at least three children all of whom have been in and out during her hospital stay. She has one son who is her primarily takes care of her. She also reports that she additionally has a nurse who comes to the house to take care of her. REVIEW OF SYSTEMS:   A minimum of an eleven point review of systems was completed.     Constitutional: negative fever, negative chills, positive for weight loss  HEENT: negative visual disturbances, negative headaches, edentulism  Respiratory: negative dyspnea, negative cough  Cardiovascular: positive chest pain,  negative palpitations  Gastrointestinal: positive abdominal pain, negative RUQ pain, negative N/V, negative diarrhea, negative constipation, negative anal or rectal bleeding  Genitourinary: negative dysuria, negative vaginal discharge, negative vaginal bleeding  Dermatological: negative rash, negative wounds  Hematologic: negative bleeding/clotting disorder  Immunologic: negative recent illness, negative recent sick contact, negative allergic reactions  Lymphatic: negative lymph nodes  Musculoskeletal: negative back pain, negative myalgias, positive arthralgias  Neurological:  negative dizziness, positive left-sided upper and lower extremity weakness, facial asymmetry  Behavior/Psych: negative depression, negative anxiety    _______________________________________________________________________    GYNECOLOGICAL HISTORY:  Age of Menarche: does not recall  Age of Menopause: >25 years ago, no vaginal bleeding or spotting since    Sexually Active: not sexually active   STD History: no past history    Pap History: Patient does not recall the results of last Pap test but believes all her pap smears were normal.  Colposcopy History: denies, reports the con    Permanent Sterilization: tubal ligation and hysterectomy  Reversible Birth Control: no  Hormone Replacement Exposure: denies    OBSTETRICAL HISTORY:   OB History   No obstetric history on file. PAST MEDICAL HISTORY:   has a past medical history of Abnormal kidney function, Acid reflux disease, Anesthesia, Arthritis, Bell's palsy, Cancer ([facility] [address] Cerebral artery occlusion with cerebral infarction [facility]), Cerebrovascular disease, COPD (chronic obstructive pulmonary disease) ([facility] [facility] Deep vein thrombosis (DVT) ([facility] Edema, History of cancer of left breast, Hx of blood clots, Hyperlipidemia, Hypertension, Pancreatitis, Wears dentures, and Wears glasses. PAST SURGICAL HISTORY:   has a past surgical history that includes Cholecystectomy (2011); Hysterectomy; Stomach surgery; Total knee arthroplasty (Right); Tubal ligation; Appendectomy; Breast lumpectomy (Left, 07/14/2016); pr sono guide needle biopsy (06/2016); Mastectomy (Left, 09/12/2016); Upper gastrointestinal endoscopy (N/A, 8/1/2019); and Colonoscopy (N/A, 8/1/2019).     ALLERGIES:  Allergies as of 12/31/2019 - Review Complete 12/31/2019   Allergen Reaction Noted    Latex Hives 06/30/2016    Asa [aspirin] Nausea Only 06/10/2016       MEDICATIONS:  Current Facility-Administered Medications   Medication Dose (CARAFATE) tablet 1 g  1 g Oral 4x Daily Mani Gleason MD   1 g at 01/11/20 2053    polyethylene glycol (GLYCOLAX) packet 17 g  17 g Oral Nightly Mani Gleason MD   17 g at 01/08/20 2035    carvedilol (COREG) tablet 25 mg  25 mg Oral BID WC Noah June MD   25 mg at 01/11/20 1628    simvastatin (ZOCOR) tablet 40 mg  40 mg Oral Nightly Noah June MD   40 mg at 01/11/20 2053    pantoprazole (PROTONIX) tablet 40 mg  40 mg Oral Daily Noah June MD   40 mg at 01/09/20 0910    HYDROcodone-acetaminophen (NORCO) 5-325 MG per tablet 1 tablet  1 tablet Oral Q4H PRN Noah June MD   1 tablet at 01/12/20 0236    Or    HYDROcodone-acetaminophen ([address]) 5-325 MG per tablet 2 tablet  2 tablet Oral Q4H PRN Noah June MD   2 tablet at 01/01/20 1947       FAMILY HISTORY:  Family History of Breast, Ovarian, Colon or Uterine Cancer: No   family history includes Heart Disease in her mother; High Blood Pressure in her mother; Other in her father. SOCIAL HISTORY:   reports that she quit smoking about 3 months ago. She has a 54.00 pack-year smoking history. She has never used smokeless tobacco. She reports previous alcohol use. She reports that she does not use drugs. HEALTH MAINTENANCE:  Date of Last Mammogram: BIRADS Category 2 on 4/23/2018 s/p Left mastectomy  Date of Last Colonoscopy: not available   Date of Last Bone Density: not available   ________________________________________________________________________                                    VITALS:  Vitals:    01/11/20 1512 01/11/20 1907 01/12/20 0539 01/12/20 0730   BP: (!) 182/81 (!) 163/59  (!) 197/76   Pulse: 63 61  63   Resp: 20 14  16   Temp: 97.7 °F (36.5 °C) 97.3 °F (36.3 °C)  97.3 °F (36.3 °C)   TempSrc: Oral Oral  Oral   SpO2: 95% 96%  97%   Weight:   154 lb 8.7 oz (70.1 kg)    Height:                                                        INPUT/OUTPUT:  No intake/output data recorded.   In: 983 [I.V.:944]  Out: 900 [Urine:900]                                                                                                                               PHYSICAL EXAM:     General Appearance: Alert; in no acute distress. Pleasant. Limited verbal ability. Facial assymetry. Edentulism  Skin: Skin color, texture, turgor normal. No rashes or lesions. Lymphatic: No abnormally enlarged lymph nodes. Neck and EENT: normal atraumatic, no neck masses  Respiratory: Normal expansion. Clear to auscultation. No rales, rhonchi, or wheezing.   Cardiovascular: regular rate and rhythm, no murmurs rubs or gallops  Breast: (Chest) patient declined s/p left mastectomy  Abdomen: soft, non-tender, non-distended, no right upper quadrant tenderness and no CVA tenderness, laparoscopic and  abdominal scars    Pelvic Exam: briefs on  External genitalia: General appearance; normal, Hair distribution; normal  Lesion inferior to right lower labia approximately 4x2 cm and inferior to left lower labia approximately 2 x2 cm warty lesions consistent with condyloma acuminatum    Difficult to accertain if she had further lesions perianally or otherwise secondary to patient positioning and limited patient mobility    Urinary system: urethral meatus normal and buitrago catheter in place draining yellow urine  Patient declined SSE at this time    Rectal Exam: exam declined by patient, difficult to accertain if she had further lesions perianally  Musculoskeletal: residual left sided weakness in both upper and left lower extremities, facial asymmetry  Extremities: Warm and well perfused, non-tender BLE and bilaterally edematous  Psych:  oriented to time, place and person, mood and affect are within normal limits, pt is a poor historian with limited verbal capacity      LAB RESULTS:  Lab Results   Component Value Date    WBC 7.1 2020    HGB 9.8 (L) 2020    HCT 30.6 (L) 2020    MCV 74.2 (L) 2020     2020 09/23/2019    Bilateral carotid artery stenosis 09/06/2019    Generalized abdominal pain 07/29/2019    Adnexal mass     TIA (transient ischemic attack) 07/27/2019    Iron deficiency anemia     Noncompliance 06/29/2017    CKD (chronic kidney disease) stage 3, GFR 30-59 ml/min ([facility]) 06/29/2017    Cellulitis 06/28/2017    Anemia 06/28/2017    Delayed surgical wound healing 10/21/2016    Hyperkalemia 10/14/2016    Acute kidney injury ([address] 10/14/2016    Alcohol-induced chronic pancreatitis (HCC)     Anorexia     Orthostatic dizziness     Pseudomonas aeruginosa infection 09/27/2016    Hematoma (nontraumatic) of breast 09/24/2016    Local infection of skin and subcutaneous tissue 09/24/2016    Anemia associated with acute blood loss 09/14/2016    Obesity (BMI 30.0-34.9) 09/13/2016    Bell's palsy 06/10/2016    Chronic deep vein thrombosis (DVT) of both lower extremities ([address] 06/10/2016    Tobacco abuse 06/10/2016    History of alcohol abuse 06/10/2016    Malignant neoplasm of upper-outer quadrant of left female breast ([address] 06/10/2016    Chronic pancreatitis ([facility] 03/30/2016    Essential hypertension 03/30/2016    Hyperlipidemia 03/30/2016    Psoriasis 03/30/2016    Gastroesophageal reflux disease without esophagitis 03/30/2016       Plan discussed with Dr. Kike Gutierres, who is agreeable.      Attending's Name: Dr. Levar Venegas DO  Ob/Gyn Resident  PGY4  1/12/2020, 11:26 AM

## 2020-01-12 NOTE — PROGRESS NOTES
(GLYCOLAX) packet 17 g, Daily PRN  ondansetron (ZOFRAN) injection 4 mg, Q6H PRN  nicotine polacrilex (NICORETTE) gum 2 mg, Q2H PRN  acetaminophen (TYLENOL) tablet 650 mg, Q4H PRN  sennosides-docusate sodium (SENOKOT-S) 8.6-50 MG tablet 1 tablet, Nightly PRN  sucralfate (CARAFATE) tablet 1 g, 4x Daily  polyethylene glycol (GLYCOLAX) packet 17 g, Nightly  carvedilol (COREG) tablet 25 mg, BID WC  simvastatin (ZOCOR) tablet 40 mg, Nightly  pantoprazole (PROTONIX) tablet 40 mg, Daily  HYDROcodone-acetaminophen (NORCO) 5-325 MG per tablet 1 tablet, Q4H PRN    Or  HYDROcodone-acetaminophen (NORCO) 5-325 MG per tablet 2 tablet, Q4H PRN        Data:     Code Status:  Full Code    Family History   Problem Relation Age of Onset    High Blood Pressure Mother     Heart Disease Mother     Other Father         TB       Social History     Socioeconomic History    Marital status:      Spouse name: Not on file    Number of children: Not on file    Years of education: Not on file    Highest education level: Not on file   Occupational History    Not on file   Social Needs    Financial resource strain: Not on file    Food insecurity:     Worry: Not on file     Inability: Not on file    Transportation needs:     Medical: Not on file     Non-medical: Not on file   Tobacco Use    Smoking status: Former Smoker     Packs/day: 1.00     Years: 54.00     Pack years: 54.00     Last attempt to quit: 2019     Years since quittin.2    Smokeless tobacco: Never Used   Substance and Sexual Activity    Alcohol use: Not Currently    Drug use: No    Sexual activity: Not on file   Lifestyle    Physical activity:     Days per week: Not on file     Minutes per session: Not on file    Stress: Not on file   Relationships    Social connections:     Talks on phone: Not on file     Gets together: Not on file     Attends L[employer] service: Not on file     Active member of club or organization: Not on file     Attends meetings ([facility]    Weight loss  Resolved Problems:    Severe malnutrition ([facility]      Past Medical History:   Diagnosis Date    Abnormal kidney function     PT. RELATES UNAWARE OF.  Acid reflux disease     Anesthesia     'needs to be asleep when ET tube removed due  to severe gastric reflux will make me have hard time breathing. \"     Arthritis     Bell's palsy     Cancer ([facility]     Breast, lt.  Cerebral artery occlusion with cerebral infarction [facility])     Cerebrovascular disease     pt denies stroke she says she had bells palsy    COPD (chronic obstructive pulmonary disease) ([facility]     patient is unaware    Deep vein thrombosis (DVT) ([facility]     multiple    Edema     RT. LEG.  History of cancer of left breast     Hx of blood clots     dvt scott. legs.  Hyperlipidemia     Hypertension     Pancreatitis     Wears dentures     UPPER    Wears glasses         Plan:        1.  plan for G-tube today per GI, need consent from the family  2.  continue supportive care  3. PPI  4. DVT Prophylaxis  5. EPCs  6.  PT/OT to evaluate and treat  7.  check and replace electrolytes per sliding scale      Electronically signed by Litzy Moncada MD

## 2020-01-12 NOTE — PROGRESS NOTES
Date:   1/12/2020  Patient name: Kimberlyn Dillard  Date of admission:  12/31/2019 12:23 PM  MRN:   [id number]  YOB: 1940  PCP: Praful Riddle MD    Reason for Admission: Elevated troponin [R79.89]  Elevated troponin [R79.89]    Cardiology follow-up: Abnormal troponin, accelerated hypertension       Impression  Accelerated hypertension  Normal LV function, normal wall motion, 2D echo 9/25/2019 ejection fraction more than 55%  Noncompliant for medication  History of CVA, left hemiplegia, poor mobility  History of GI bleeding  Lymphedema both lower extremities     KAILA, serum creatinine 1.71 probably secondary to Cipro + diuretics    History of presenting illness:   43-year-old female with past medical history of hypertension, CVA left hemiplegia got admitted with the elevated systolic blood pressure over 200 mmHg.  Patient was sent in by her home health aide after they noticed her blood pressure  very high.  On further history examination patient also complained of constant chest pain sharp in nature.  She also had a chest wall tenderness  She was previously admitted on 12/4/2019 with a left upper chest and shoulder pain.  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  High-sensitivity troponin was elevated at 33 and 33.     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, no change from ECG obtained on 12/4/2019  Labs 1/1/2020  Sodium 140, potassium 2.8, BUN 13, creatinine 0.54, myoglobin 29, high-sensitivity troponin 61 and 65  Hemoglobin 12.3, WBC 8.8, platelets 235 MCV 32.1     2D echo 9/25/2019 normal LV size, ejection fraction more than 55%, no significant valvular abnormality     Past surgical history:   Cholecystectomy, hysterectomy, stomach surgery, appendectomy, left breast lumpectomy, mastectomy 9/12/2006, total knee arthroplasty right    1/12/2020 sodium 143, potassium 3.0, calcium 7.2, creatinine 0.99, hemoglobin 9.8    Patient seen and examined yesterday and today discussed with the patient nurse  She is refusing for medication and food  She is on IV fluids  Patient did not appear in any distress  No tachypnea, no tachycardia  Afebrile  Blood pressure remains elevated    Medications:   Scheduled Meds:   cloNIDine  1 patch Transdermal Weekly    hydrALAZINE  25 mg Intravenous Q8H    sodium bicarbonate  650 mg Oral TID    sodium chloride flush  10 mL Intravenous 2 times per day    sucralfate  1 g Oral 4x Daily    polyethylene glycol  17 g Oral Nightly    carvedilol  25 mg Oral BID WC    simvastatin  40 mg Oral Nightly    pantoprazole  40 mg Oral Daily     Continuous Infusions:   IV infusion builder      IV infusion builder 75 mL/hr at 01/11/20 0846     CBC:   Recent Labs     01/12/20  0821   WBC 7.1   HGB 9.8*        BMP:    Recent Labs     01/10/20  0636 01/11/20  0650 01/12/20  0609   * 143 143   K 3.6* 3.1* 3.0*   * 111* 108*   CO2 18* 22 23   BUN 25* 21 17   CREATININE 1.65* 1.31* 0.99*   GLUCOSE 140* 126* 115*     Hepatic: No results for input(s): AST, ALT, ALB, BILITOT, ALKPHOS in the last 72 hours. Troponin: No results for input(s): TROPONINI in the last 72 hours. BNP: No results for input(s): BNP in the last 72 hours. Lipids: No results for input(s): CHOL, HDL in the last 72 hours. Invalid input(s): LDLCALCU  INR:   Recent Labs     01/12/20  0821   INR 1.9       Objective:   Vitals: BP (!) 197/76   Pulse 63   Temp 97.3 °F (36.3 °C) (Oral)   Resp 16   Ht 5' 2\" (1.575 m)   Wt 154 lb 8.7 oz (70.1 kg)   SpO2 97%   BMI 28.27 kg/m²   General appearance: Elderly frail looking female open eyes to verbal command  HEENT: Head: Normal, normocephalic, atraumatic. Neck: no JVD and supple, symmetrical, trachea midline  Lungs: diminished breath sounds bilaterally  Heart: regular rate and rhythm  Abdomen: BS +  Extremities: Bilateral lymphedema  Neurologic: Mental status:  Follow verbal command    EKG: normal sinus rhythm, unchanged from previous tracings. ECHO: reviewed. Ejection fraction: 55%  Stress Test: not obtained. Cardiac Angiography: not obtained. Assessment / Acute Cardiac Problems:    Patient admitted with accelerated hypertension  Constant chest pain, chest wall tenderness, does not appears in pain  Borderline abnormal high-sensitivity troponin  Patient is mostly nonverbal  Normal LV systolic function, normal wall motion 2D echo 9/25/2019  History of CVA, left hemiplegia, Bell's palsy  History of DVT, IVC filter  History of GI bleeding  Bilateral lymphedema  UTI    January 12, 2020 patient continues to refuse medication and food  Patient refused for PEG tube placement  No sign of cardiac decompensation    Patient Active Problem List:     Chronic pancreatitis ([address]     Essential hypertension     Hyperlipidemia     Psoriasis     Gastroesophageal reflux disease without esophagitis     Bell's palsy     Chronic deep vein thrombosis (DVT) of both lower extremities (HCC)     Tobacco abuse     History of alcohol abuse     Malignant neoplasm of upper-outer quadrant of left female breast (HCC)     Obesity (BMI 30.0-34. 9)     Anemia associated with acute blood loss     Hematoma (nontraumatic) of breast     Local infection of skin and subcutaneous tissue     Pseudomonas aeruginosa infection     Anorexia     Orthostatic dizziness     Hyperkalemia     Acute kidney injury ([address]     Alcohol-induced chronic pancreatitis (HCC)     Delayed surgical wound healing     Cellulitis     Anemia     Noncompliance     CKD (chronic kidney disease) stage 3, GFR 30-59 ml/min (HCC)     Iron deficiency anemia     TIA (transient ischemic attack)     Generalized abdominal pain     Adnexal mass     Bilateral carotid artery stenosis     Impaired gait     GI bleed     Acute deep vein thrombosis (DVT) of distal vein of lower extremity (HCC)     Occult blood positive stool     Moderate obesity     Gastroesophageal reflux disease     Mild

## 2020-01-12 NOTE — PROGRESS NOTES
I sent a perfect serve message to the [facility] resident Dr. Raymond Dunn. This was done at 11:05 pm on 1/12/2020. [facility]

## 2020-01-12 NOTE — PROGRESS NOTES
INTAKE/OUTPUT:      Intake/Output Summary (Last 24 hours) at 1/12/2020 1735  Last data filed at 1/12/2020 0534  Gross per 24 hour   Intake 944 ml   Output 900 ml   Net 44 ml       Constitutional:  Awake and alert. States that she feels okay. Cardiovascular/Edema:  S1, S2 with no pericardial rub or gallops  Respiratory:  Clinically clear. Gastrointestinal:  Soft with normal bowel sounds. CBC:   Recent Labs     01/12/20  0821   WBC 7.1   HGB 9.8*        BMP:    Recent Labs     01/10/20  0636 01/11/20  0650 01/12/20  0609   * 143 143   K 3.6* 3.1* 3.0*   * 111* 108*   CO2 18* 22 23   BUN 25* 21 17   CREATININE 1.65* 1.31* 0.99*   GLUCOSE 140* 126* 115*       Lab Results   Component Value Date    NITRU NEGATIVE 01/09/2020    COLORU YELLOW 01/09/2020    PHUR 5.0 01/09/2020    WBCUA 2 TO 5 01/09/2020    RBCUA 2 TO 5 01/09/2020    MUCUS NOT REPORTED 01/09/2020    TRICHOMONAS NOT REPORTED 01/09/2020    YEAST NOT REPORTED 01/09/2020    BACTERIA FEW 01/09/2020    SPECGRAV 1.009 01/09/2020    LEUKOCYTESUR NEGATIVE 01/09/2020    UROBILINOGEN Normal 01/09/2020    BILIRUBINUR NEGATIVE 01/09/2020    GLUCOSEU NEGATIVE 01/09/2020    KETUA NEGATIVE 01/09/2020    AMORPHOUS NOT REPORTED 01/09/2020     Urine Sodium:     Lab Results   Component Value Date    JOHN 75 01/09/2020     Urine Potassium:    Lab Results   Component Value Date    KUR 16.7 01/09/2020     Urine Chloride:    Lab Results   Component Value Date    CLUR 96 01/09/2020     Urine Creatinine:     Lab Results   Component Value Date    LABCREA 57.6 01/09/2020     IMPRESSION/RECOMMENDATIONS:      1. Acute kidney injury - consistent with prerenal azotemia. Renal function  improving. Continue  Strict input and output documentation. Basic metabolic profile daily. 2.  Systemic hypertension - blood pressure control is suboptimal   Patient is unable to take oral blood pressure medications  continue I IV Hydralazine , continue clonidine    3.

## 2020-01-12 NOTE — PLAN OF CARE
Problem: Falls - Risk of:  Goal: Will remain free from falls  Description  Will remain free from falls  Outcome: Ongoing     Problem: Falls - Risk of:  Goal: Absence of physical injury  Description  Absence of physical injury  Outcome: Ongoing     Problem: Risk for Impaired Skin Integrity  Goal: Tissue integrity - skin and mucous membranes  Description  Structural intactness and normal physiological function of skin and  mucous membranes.   Outcome: Ongoing     Problem: Pain:  Goal: Pain level will decrease  Description  Pain level will decrease  Outcome: Ongoing     Problem: Pain:  Goal: Control of acute pain  Description  Control of acute pain  Outcome: Ongoing     Problem: Musculor/Skeletal Functional Status  Goal: Highest potential functional level  Outcome: Ongoing

## 2020-01-12 NOTE — PROGRESS NOTES
no carotid bruits  Abdomen: soft, non-tender, non-distended, normal bowel sounds, no masses or organomegaly  Extremities: no cyanosis, clubbing or edema  Musculoskeletal: normal range of motion, no joint swelling, deformity or tenderness  Neurologic: no cranial nerve deficit and muscle strength normal    Lab and Imaging Review     BMP  Recent Labs     01/10/20  0636 01/11/20  0650 01/12/20  0609   * 143 143   K 3.6* 3.1* 3.0*   * 111* 108*   CO2 18* 22 23   BUN 25* 21 17   CREATININE 1.65* 1.31* 0.99*   GLUCOSE 140* 126* 115*   CALCIUM 7.7* 7.4* 7.2*       LFTS  Recent Labs     01/10/20  0636 01/11/20  0650 01/12/20  0609   LABALBU 2.3* 2.2* 2.1*         ASSESSMENT/PLAN:  1. Malnutrition; pt refusing to eat; hx dementia  -pt is confused but currently declining peg tube, unsure if she understands consequences of refusing procedure and continuing to refuse to eat. decision needs to be confirmed with family when they arrive today, RN notified     2.anemia; no overt bleeding, iron level decreased  -recc oral iron  -trend h/h          This plan was formulated in collaboration with Dr. Nanda Castillo. Electronically signed by: OTTO Reza - CNP on 1/12/2020 at 7:37 AM     Attending Physician Statement  I have discussed the care of [facility] LIVIA Dillard and   I have examined the patient myselft independently, and taken ros and hpi , including pertinent history and exam findings,  with the author of this note . I have reviewed the key elements of all parts of the encounter with the nurse practitioner/resident.     I agree with the assessment, plan and orders as documented by the above health care provider       Was informed by staff that the patient would be going into hospice and family refusing to have any PEG tube or NG tube  Family is not available at this time to us  Staff was advised that we will sign off if there is any change in the plan to let us know  Electronically signed by Tesha Tineo MD

## 2020-01-13 NOTE — PROGRESS NOTES
Physical Therapy  Facility/Department: [facility] [[email or web address] MED SURG  Daily Treatment Note  NAME: Ebonie Dillard  : 1940  MRN: [id number]    Date of Service: 2020    Discharge Recommendations:  Patient would benefit from continued therapy after discharge      Assessment   Body structures, Functions, Activity limitations: Decreased functional mobility ; Decreased strength;Decreased endurance;Decreased ROM  Assessment: continue per POC to maxmize potential for safe D/C, pt would require round the clock care as she is dependent for all care  Treatment Diagnosis: impaired mobility  History: admitted due to C/O abdominal pain  PT Education: Pressure Relief; Functional Mobility Training  Barriers to Learning: cognition  REQUIRES PT FOLLOW UP: Yes  Activity Tolerance  Activity Tolerance: Patient limited by endurance; Patient limited by fatigue;Patient limited by pain     Patient Diagnosis(es): The primary encounter diagnosis was Elevated troponin. Diagnoses of Hypertension, unspecified type and Chest pain on breathing were also pertinent to this visit. has a past medical history of Abnormal kidney function, Acid reflux disease, Anesthesia, Arthritis, Bell's palsy, Cancer ([facility] [address] Cerebral artery occlusion with cerebral infarction [facility]), Cerebrovascular disease, COPD (chronic obstructive pulmonary disease) ([facility] [address] Deep vein thrombosis (DVT) ([facility] [address] Edema, History of cancer of left breast, Hx of blood clots, Hyperlipidemia, Hypertension, Pancreatitis, Wears dentures, and Wears glasses. has a past surgical history that includes Cholecystectomy (); Hysterectomy; Stomach surgery; Total knee arthroplasty (Right); Tubal ligation; Appendectomy; Breast lumpectomy (Left, 2016); pr sono guide needle biopsy (2016); Mastectomy (Left, 2016); Upper gastrointestinal endoscopy (N/A, 2019); and Colonoscopy (N/A, 2019).     Restrictions  Restrictions/Precautions  Restrictions/Precautions: Fall Risk(peripheral IV rolling and turning schedule for positioning- family to demonstrate good technique  Patient Goals   Patient goals : did not state a goal    Plan    Plan  Times per week: 3-5  treatments/ week  Times per day: (3-5  treatments/ week)  Specific instructions for Next Treatment: 1-3-2020 bedside eval completed, dep x 2 to roll either direction, FALL RISK- hx CVA w/ left hemiparesis, nonambulatory  Current Treatment Recommendations: Strengthening, ROM, Positioning, Patient/Caregiver Education & Training, Safety Education & Training  Safety Devices  Type of devices: Bed alarm in place, Call light within reach, Left in bed, Nurse notified(LEI Miller present in room at end of session)     Therapy Time   Individual Concurrent Group Co-treatment   Time In 0914         Time Out 0933         Minutes [address] [place], [place]

## 2020-01-13 NOTE — PROGRESS NOTES
Met with son Charmayne Pilar in the patient's room. He stated that his sister Shagufta Duran and his wife Annabella Jolly are working double shifts today. Charmayne Pilar stated \" we have talked about care, and we think that hospice care is most appropriate at this time. \" Charmayne Pilar stated \" she is not eating or drinking and has not been able to for sometime. \" Charmayne Pilar states \" we need 24 hour care for my Mom. \" I ask to set up a meeting for tomorrow late morning ,so Judith and Last can attend and Charmayne Pilar agrees. I set a meeting for tomorrow at 11am with hospice of [facility] , and Charmayne Cristel agrees and he will update his family of the meeting and the time. Will follow for goals of care and support. Vasile Byers .[address] [facility] LEI [place]  [facility]: [phone]  [address]ash[address]: [phone]  Work Cell: [phone]

## 2020-01-13 NOTE — PROGRESS NOTES
[address]   INPATIENT OCCUPATIONAL THERAPY  PROGRESS NOTE  Date: 2020  Patient Name: Nghia Dillard      Room: [facility]0/1104-45  MRN: [id number]    : 1940  [de-identified] y.o.) Gender: female     Discharge Recommendations:  Further Occupational  Therapy is recommended upon facility discharge. OT Equipment Recommendations  Equipment Needed: Yes  Mobility Devices: Transport Devices  Transport Devices: Patient Mechanical Lift    Referring Practitioner: Dr Fanny García   Diagnosis: Hypertensive Urgency   General  Chart Reviewed: Yes, Progress Notes, History and Physical, Previous Admission, Orders  Patient assessed for rehabilitation services?: Yes  Additional Pertinent Hx: Multiple admissions   Family / Caregiver Present: Yes(Son present at end of session - RN notified)  Referring Practitioner: Dr Fanny García   Diagnosis: Hypertensive Urgency     Restrictions  Restrictions/Precautions: Fall Risk(peripheral IV right hand, external urinary catheter, telesitter, troponins 61 on 19)  Implants present? : Metal implants(right TKR)  Other position/activity restrictions:  NO BP OR LAB DRAWS LEFT ARM due to left breast mastectomy, up as tolerated, pureed diet  Required Braces or Orthoses?: No      Subjective  Subjective: \"Cover me up\" pt stated in response to OT asking question of \"can I get you anything before I go? \"  Patient Currently in Pain: Yes  Pain Location: Abdomen        Pain Assessment  Sue-Dukes Pain Rating: Hurts whole lot  Pain Type: Acute pain  Pain Location: Abdomen    Objective  Cognition  Overall Cognitive Status: Impaired  Arousal/Alertness: Inconsistent responses to stimuli  Attention Span: Difficulty attending to directions  Memory: Unable to assess  Following Commands:  Follows one step commands with repetition  Safety Judgement: Decreased awareness of need for safety  Awareness of Errors: Decreased awareness of errors  Insights: Decreased awareness of deficits  Sequencing Reorientation, Neuromuscular Re-education, Patient/Caregiver Education & Training, Safety Education & Training, Self-Care / ADL, Positioning      Goals  Short term goals  Time Frame for Short term goals: 1-3 days   Short term goal 1: Pt will complete bed mobility with Mod A x2 and tolerate sitting EOB for 5-10 minutes with SBA while participating in a functional task. Short term goal 2:  Pt will actively participate in self-care routine and complete tasks at Max level of IND. Short term goal 3:  Pt will actively participate in 15-20 minutes of therapeutic exercise/activity to promote increased use of UE's in  self-care and mobility.     OT Individual Minutes  Time In: 9192  Time Out: [place]lette  Minutes: 8      Electronically signed by Elvie Carrel, OT on 1/13/20 at 2:47 PM

## 2020-01-13 NOTE — PROGRESS NOTES
[facility] is not working on a new pre-cert. Pt was just denied for skilled care by insurance. Pt can go under her medicaid but family will have to pay the patient liability.

## 2020-01-13 NOTE — PROGRESS NOTES
loss    Objective Information:  · Nutrition-Focused Physical Findings: Nausea. Edema: +1 generalized; trace RUE, LUE; +2 LLE; +3 RLE. · Wound Type: (medial back)  · Current Nutrition Therapies:  · Oral Diet Orders: General, Dysphagia Pureed (Dysphagia 1)   · Oral Diet intake: 1-25%, 0%, Refusing to eat  · Oral Nutrition Supplement (ONS) Orders: Standard High Calorie Oral Supplement, Frozen Oral Supplement  · ONS intake: Unable to assess  · Anthropometric Measures:  · Ht: 5' 2\" (157.5 cm)   · Current Body Wt: 154 lb 8.7 oz (70.1 kg)  · Admission Body Wt: 143 lb (64.9 kg)  · Usual Body Wt: 155 lb (70.3 kg)(7/27/19)  · % Weight Change:  ,  7.7% loss in 5 months based on admission weight   · Ideal Body Wt: 110 lb (49.9 kg), % Ideal Body 130% based on admission weight   · BMI Classification: BMI 25.0 - 29.9 Overweight    Nutrition Interventions:   Continue current diet, Continue current ONS  Continued Inpatient Monitoring    Nutrition Evaluation:   · Evaluation: No progress toward goals   · Goals: PO intakes are greater than 75% at meals    · Monitoring: Meal Intake, Supplement Intake, Pertinent Labs, Weight, Monitor Bowel Function, Chewing/Swallowing, Diet Tolerance, Skin Integrity, Wound Healing, I&O      Marleen Nelson R.D., L.D.   Phone: [phone]

## 2020-01-13 NOTE — FLOWSHEET NOTE
01/13/20 1636   Encounter Summary   Services provided to: Patient   Referral/Consult From: Palliative Care   Continue Visiting   (1-13-20)   Complexity of Encounter Low   Length of Encounter 15 minutes   Spiritual/Pentecostalism   Type Spiritual support   Assessment Approachable;Passive   Intervention Prayer; Active listening;Sustaining presence/ Ministry of presence   Outcome Expressed gratitude

## 2020-01-13 NOTE — CONSULTS
Patient seen by Palliative Care this hospital stay. Peg tube cancelled per patient's wishes as updated to me from a phone message per daughter Chata Flowers on Wayne 10, 2020. I noted that Chata Flowers is to speak to her siblings in regards to possible hospice options  for the patient. I called daughter Chata Flowers at [phone]  I called son Henry Wood at [phone]  I left a message for both to call me back to discuss options of care according to the patient's wishes , and I left my office phone number to return my call. Will follow for goals of care and patient/family support. Diane Monteiro [address] [facility] LEI [address]  [facility]: [phone]  Rashmi [address]: [phone]  Work Cell: [phone]

## 2020-01-13 NOTE — PROGRESS NOTES
Admitted per wheelchair from admitting. Wife at bedside. She states that patient is able to ambulate a few feet, but is very weak right now. Call light at bedside. Assisted to bed and positioned to comfort. Oriented to room. Admission orders sent from office.

## 2020-01-13 NOTE — CARE COORDINATION
DISCHARGE PLANNING NOTE:    Discharge plan remains undetermined at this time. Palliative care RN, Tonya, left messages for both of pt's children regarding possible hospice options as they have refused PEG tube and pt is not eating or drinking. W notes state that [employer] is not working on [employer]. Pt was denied for skilled care, but could go under Medicaid, but family would have to pay the patient liability. Will conitnue to follow for additional d/c needs.     Electronically signed by Arlen Schmid RN on 1/13/2020 at 12:21 PM

## 2020-01-13 NOTE — PLAN OF CARE
Problem: Falls - Risk of:  Goal: Will remain free from falls  Description  Will remain free from falls  Outcome: Met This Shift  Note:   Safety maintained. No falls this shift. Safety protocols in place. Goal: Absence of physical injury  Description  Absence of physical injury  Outcome: Met This Shift  Note:   Pt. Free of falls and injuries this shift. Problem: Risk for Impaired Skin Integrity  Goal: Tissue integrity - skin and mucous membranes  Description  Structural intactness and normal physiological function of skin and  mucous membranes. Outcome: Met This Shift  Note:   Skin integrity improved/maintained this shift. See head to toe assessment. Problem: Pain:  Goal: Control of acute pain  Description  Control of acute pain  Outcome: Met This Shift  Note:   Adequate pain control achieved this shift. See MAR.

## 2020-01-13 NOTE — PROGRESS NOTES
Admitted per wheelchair from admitting. Wife at bedside. She states that patient is able to ambulate a few steps but is very weak right now. Assisted to bed and positioned to comfort. Call light given and oriented to room.

## 2020-01-13 NOTE — FLOWSHEET NOTE
Daughter and son and daughter in law in yesterday to visit. According to the daughter they will not pursue the PEG tube. They stated that their mother does not want it. When asked by Gris Fierro, pt did state that she does not want a PEG tube. At the time, the daughter said that she would discuss hospice options with her siblings and let the nursing staff know what they decide when they come back to visit the next day. Patients family did not come in today nor did they call with any further information.

## 2020-01-14 NOTE — PROGRESS NOTES
INTAKE/OUTPUT:      Intake/Output Summary (Last 24 hours) at 1/13/2020 2217  Last data filed at 1/13/2020 1829  Gross per 24 hour   Intake 2225 ml   Output 850 ml   Net 1375 ml       Constitutional:  Awake and alert. States that she feels okay. Cardiovascular/Edema:  S1, S2 with no pericardial rub or gallops  Respiratory:  Clinically clear. Gastrointestinal:  Soft with normal bowel sounds. CBC:   Recent Labs     01/12/20  0821   WBC 7.1   HGB 9.8*        BMP:    Recent Labs     01/11/20  0650 01/12/20  0609 01/12/20  2219 01/13/20  0546    143  --  145*   K 3.1* 3.0* 4.3 4.0   * 108*  --  109*   CO2 22 23  --  24   BUN 21 17  --  15   CREATININE 1.31* 0.99*  --  0.89   GLUCOSE 126* 115*  --  91       Lab Results   Component Value Date    NITRU NEGATIVE 01/09/2020    COLORU YELLOW 01/09/2020    PHUR 5.0 01/09/2020    WBCUA 2 TO 5 01/09/2020    RBCUA 2 TO 5 01/09/2020    MUCUS NOT REPORTED 01/09/2020    TRICHOMONAS NOT REPORTED 01/09/2020    YEAST NOT REPORTED 01/09/2020    BACTERIA FEW 01/09/2020    SPECGRAV 1.009 01/09/2020    LEUKOCYTESUR NEGATIVE 01/09/2020    UROBILINOGEN Normal 01/09/2020    BILIRUBINUR NEGATIVE 01/09/2020    GLUCOSEU NEGATIVE 01/09/2020    KETUA NEGATIVE 01/09/2020    AMORPHOUS NOT REPORTED 01/09/2020     Urine Sodium:     Lab Results   Component Value Date    JOHN 75 01/09/2020     Urine Potassium:    Lab Results   Component Value Date    KUR 16.7 01/09/2020     Urine Chloride:    Lab Results   Component Value Date    CLUR 96 01/09/2020     Urine Creatinine:     Lab Results   Component Value Date    LABCREA 57.6 01/09/2020     IMPRESSION/RECOMMENDATIONS:      1. Acute kidney injury - consistent with prerenal azotemia. Renal function  improving. Continue  Strict input and output documentation. Basic metabolic profile daily.     2.  Systemic hypertension - blood pressure control is suboptimal   Patient is unable to take oral blood pressure medications  continue I IV Hydralazine , continue clonidine,increase  clonidine patch to 0.3 mg.    3.  Hypernatremia - consistent with cellular dehydration. Estimated free water deficit 2.2 L.     4.  Non-anion gap metabolic acidosis - top differential is renal tubular acidosis in the absence of diarrhea. Check urinary anion gap. Plan  Continue IV fluids, decrease to 25 cc/hour  Continue IV hydralazine, increase  clonidine patch to 0.3 mg.   Family is thinking about hospice final decision pending    Tia Hernandez MD  Attending Nephrologist  1/13/2020 10:17 PM

## 2020-01-14 NOTE — FLOWSHEET NOTE
Dr Margene Goldmann rounded and signed [facility] form after Hospice spoke to family and all agreeable. Dr Margene Goldmann ok for patient to be discharged to hospice inpt unit today.

## 2020-01-14 NOTE — PROGRESS NOTES
24 hour   Intake 1035 ml   Output 950 ml   Net 85 ml       Constitutional:  Awake and alert. States that she feels okay. Cardiovascular/Edema:  S1, S2 with no pericardial rub or gallops  Respiratory:  Clinically clear. Gastrointestinal:  Soft with normal bowel sounds. CBC:   Recent Labs     01/12/20  0821   WBC 7.1   HGB 9.8*        BMP:    Recent Labs     01/12/20  0609 01/12/20  2219 01/13/20  0546 01/14/20  0722     --  145* 145*   K 3.0* 4.3 4.0 3.9   *  --  109* 112*   CO2 23  --  24 22   BUN 17  --  15 13   CREATININE 0.99*  --  0.89 0.75   GLUCOSE 115*  --  91 88       Lab Results   Component Value Date    NITRU NEGATIVE 01/09/2020    COLORU YELLOW 01/09/2020    PHUR 5.0 01/09/2020    WBCUA 2 TO 5 01/09/2020    RBCUA 2 TO 5 01/09/2020    MUCUS NOT REPORTED 01/09/2020    TRICHOMONAS NOT REPORTED 01/09/2020    YEAST NOT REPORTED 01/09/2020    BACTERIA FEW 01/09/2020    SPECGRAV 1.009 01/09/2020    LEUKOCYTESUR NEGATIVE 01/09/2020    UROBILINOGEN Normal 01/09/2020    BILIRUBINUR NEGATIVE 01/09/2020    GLUCOSEU NEGATIVE 01/09/2020    KETUA NEGATIVE 01/09/2020    AMORPHOUS NOT REPORTED 01/09/2020     Urine Sodium:     Lab Results   Component Value Date    JOHN 75 01/09/2020     Urine Potassium:    Lab Results   Component Value Date    KUR 16.7 01/09/2020     Urine Chloride:    Lab Results   Component Value Date    CLUR 96 01/09/2020     Urine Creatinine:     Lab Results   Component Value Date    LABCREA 57.6 01/09/2020     IMPRESSION/RECOMMENDATIONS:      1. Acute kidney injury - consistent with prerenal azotemia. Resolved. 2.  Systemic hypertension - Blood pressure remains labile but overall suboptimally controlled due to compliance issues. Continue current medications. 3.  Hypernatremia - consistent with cellular dehydration. Estimated free water deficit 2.2 L. [place] oral fluid intake. Overall prognosis is poor. Hospice appropriate.     Yolanda Tabares MD  Attending

## 2020-01-14 NOTE — FLOWSHEET NOTE
Patient discharge to Hospice of [address] [place] [address] per orders. Midline IV discontinued intact. Patient sent with buitrago in place. Belongings taken per family. Patient taken per [facility].

## 2020-01-23 ENCOUNTER — TELEPHONE (OUTPATIENT)
Dept: FAMILY MEDICINE CLINIC | Age: 80
End: 2020-01-23

## 2020-02-10 NOTE — DISCHARGE SUMMARY
Hospitalist Discharge Summary    Adalid Dillard  :  1940  MRN:  [id number]    Admit date:  2019  Discharge date:  2020    Admitting Physician:  Brunilda Mackay MD    Discharge Diagnoses:   Patient Active Problem List   Diagnosis    Chronic pancreatitis [facility])    Essential hypertension    Hyperlipidemia    Psoriasis    Gastroesophageal reflux disease without esophagitis    Bell's palsy    Chronic deep vein thrombosis (DVT) of both lower extremities ([facility] [address]    Tobacco abuse    History of alcohol abuse    Malignant neoplasm of upper-outer quadrant of left female breast ([facility] [address]    Obesity (BMI 30.0-34. 9)    Anemia associated with acute blood loss    Hematoma (nontraumatic) of breast    Local infection of skin and subcutaneous tissue    Pseudomonas aeruginosa infection    Anorexia    Orthostatic dizziness    Hyperkalemia    Acute kidney injury (HCC)    Alcohol-induced chronic pancreatitis (HCC)    Delayed surgical wound healing    Cellulitis    Anemia    Noncompliance    CKD (chronic kidney disease) stage 3, GFR 30-59 ml/min (HCC)    Iron deficiency anemia    TIA (transient ischemic attack)    Generalized abdominal pain    Adnexal mass    Bilateral carotid artery stenosis    Impaired gait    GI bleed    Acute deep vein thrombosis (DVT) of distal vein of lower extremity (HCC)    Occult blood positive stool    Moderate obesity    Gastroesophageal reflux disease    Mild malnutrition (HCC)    Chest pain    Elevated troponin    Hypertensive urgency    Hypokalemia    Acute cystitis without hematuria    Moderate protein-calorie malnutrition (HCC)    Weight loss    Condyloma acuminatum in female        Admission Condition:  poor      Discharged Condition:  poor    Hospital Course/Treatments   Pt was admiteed [facility] lt hemiplegia,h/o of dvt and h/o of having recent admission,  Pt has been on diuretics,pt has been doing poor,pt has been seen by nephro,pt decided to

## 2020-02-27 NOTE — CONSULTS
CC: Mechanical fall/ NSTEMI (2020 07:58)    INTERVAL HPI/OVERNIGHT EVENTS:  no acute events  denies chest pain  no complaints    Vital Signs Last 24 Hrs  T(C): 36.8 (2020 08:00), Max: 36.8 (2020 08:00)  T(F): 98.3 (2020 08:00), Max: 98.3 (2020 08:00)  HR: 61 (2020 08:00) (61 - 79)  BP: 109/70 (2020 08:00) (105/69 - 130/80)  BP(mean): --  RR: 16 (2020 08:00) (16 - 20)  SpO2: 97% (2020 08:00) (97% - 99%)    PHYSICAL EXAM:  General: in no acute distress; elderly female  Neck: Supple; non tender; no masses  Respiratory: No wheezes, rales or rhonchi  Cardiovascular: Regular rate and rhythm. S1 and S2 Normal; No murmurs, gallops or rubs  Gastrointestinal: Soft non-tender non-distended; Normal bowel sounds  Extremities: Normal range of motion, No clubbing, cyanosis or edema  Vascular: Peripheral pulses palpable 2+ bilaterally  Neurological: Alert and oriented x4  Skin: Warm and dry. No acute rash  Psychiatric: Cooperative and appropriate    I&O's Detail    2020 07:01  -  2020 07:00  --------------------------------------------------------  IN:    sodium chloride 0.9%.: 455 mL  Total IN: 455 mL    OUT:  Total OUT: 0 mL    Total NET: 455 mL    CARDIAC MARKERS ( 2020 05:46 )  x     / 0.54 ng/mL / 419 U/L / x     / 34.9 ng/mL  CARDIAC MARKERS ( 2020 21:29 )  x     / 0.62 ng/mL / x     / x     / x      CARDIAC MARKERS ( 2020 17:51 )  x     / 0.64 ng/mL / x     / x     / x                          13.4   6.50  )-----------( 181      ( 2020 05:46 )             41.3     2020 05:46    143    |  107    |  15.0   ----------------------------<  90     3.8     |  22.0   |  0.51     Ca    9.0        2020 05:46  Mg     1.7       2020 05:46    TPro  6.8    /  Alb  4.1    /  TBili  0.5    /  DBili  x      /  AST  48     /  ALT  21     /  AlkPhos  131    2020 17:51    CAPILLARY BLOOD GLUCOSE  POCT Blood Glucose.: 125 mg/dL (2020 16:32)    LIVER FUNCTIONS - ( 2020 17:51 )  Alb: 4.1 g/dL / Pro: 6.8 g/dL / ALK PHOS: 131 U/L / ALT: 21 U/L / AST: 48 U/L / GGT: x           Urinalysis Basic - ( 2020 17:50 )    Color: Yellow / Appearance: Slightly Turbid / S.025 / pH: x  Gluc: x / Ketone: Large  / Bili: Negative / Urobili: Negative mg/dL   Blood: x / Protein: 100 mg/dL / Nitrite: Negative   Leuk Esterase: Moderate / RBC: 0-2 /HPF / WBC >50   Sq Epi: x / Non Sq Epi: Moderate / Bacteria: Moderate    Hemoglobin A1C, Whole Blood: 5.5 % (20 @ 05:46)    MEDICATIONS  (STANDING):  aspirin enteric coated 81 milliGRAM(s) Oral daily  atorvastatin 40 milliGRAM(s) Oral at bedtime  cefTRIAXone   IVPB 1000 milliGRAM(s) IV Intermittent every 24 hours  clopidogrel Tablet 75 milliGRAM(s) Oral daily  dorzolamide 2% Ophthalmic Solution 1 Drop(s) Both EYES every 8 hours  levothyroxine 25 MICROGram(s) Oral daily  lisinopril 2.5 milliGRAM(s) Oral daily  melatonin 5 milliGRAM(s) Oral at bedtime  metoprolol tartrate 25 milliGRAM(s) Oral two times a day  sertraline 25 milliGRAM(s) Oral daily  sodium chloride 0.9%. 1000 milliLiter(s) (65 mL/Hr) IV Continuous <Continuous>    MEDICATIONS  (PRN):  acetaminophen   Tablet .. 650 milliGRAM(s) Oral every 6 hours PRN Temp greater or equal to 38C (100.4F), Mild Pain (1 - 3)  ondansetron Injectable 4 milliGRAM(s) IV Push every 6 hours PRN Nausea    RADIOLOGY & ADDITIONAL TESTS: PRN  carvedilol (COREG) tablet 25 mg, BID WC  simvastatin (ZOCOR) tablet 40 mg, Nightly  pantoprazole (PROTONIX) tablet 40 mg, Daily  HYDROcodone-acetaminophen (NORCO) 5-325 MG per tablet 1 tablet, Q4H PRN    Or  HYDROcodone-acetaminophen (NORCO) 5-325 MG per tablet 2 tablet, Q4H PRN        Allergies:  Latex and Asa [aspirin]    Social History:   Social History     Socioeconomic History    Marital status:      Spouse name: Not on file    Number of children: Not on file    Years of education: Not on file    Highest education level: Not on file   Occupational History    Not on file   Social Needs    Financial resource strain: Not on file    Food insecurity:     Worry: Not on file     Inability: Not on file    Transportation needs:     Medical: Not on file     Non-medical: Not on file   Tobacco Use    Smoking status: Former Smoker     Packs/day: 1.00     Years: 54.00     Pack years: 54.00     Last attempt to quit: 2019     Years since quittin.2    Smokeless tobacco: Never Used   Substance and Sexual Activity    Alcohol use: Not Currently    Drug use: No    Sexual activity: Not on file   Lifestyle    Physical activity:     Days per week: Not on file     Minutes per session: Not on file    Stress: Not on file   Relationships    Social connections:     Talks on phone: Not on file     Gets together: Not on file     Attends Yazidism service: Not on file     Active member of club or organization: Not on file     Attends meetings of clubs or organizations: Not on file     Relationship status: Not on file    Intimate partner violence:     Fear of current or ex partner: Not on file     Emotionally abused: Not on file     Physically abused: Not on file     Forced sexual activity: Not on file   Other Topics Concern    Not on file   Social History Narrative    Not on file       Family History:   Family History   Problem Relation Age of Onset    High Blood Pressure Mother     Heart Disease no organomegally  BACK: Examination of the spine reveals  no spinal deformity, without tenderness,   MUSKULOSKELETAL: Adequately aligned spine. No joint erythema or tenderness. EXTREMITIES: Chronic peripheral edema bilateral lower extremities  NEURO:Alert oriented x 3 ,power 5/5 in all extremities      Labs:   CBC:  Recent Labs     01/06/20  0614 01/07/20  1340 01/08/20  0910   WBC 10.7 9.6 7.8   RBC 3.99* 4.40 4.47   HGB 9.3* 10.5* 10.4*   HCT 29.9* 34.8* 34.3*   MCV 74.9* 79.0* 76.6*   MCH 23.3* 23.8* 23.3*   MCHC 31.2 30.2* 30.4*   RDW 19.6* 20.2* 19.8*    246 234   MPV 9.7 9.6 9.4      BMP:   Recent Labs     01/06/20  0614 01/07/20  1340 01/08/20  0910    145* 147*   K 3.3* 3.2* 4.2   * 117* 118*   CO2 17* 15* 14*   BUN 24* 24* 24*   CREATININE 2.33* 2.23* 2.11*   GLUCOSE 74 83 72   CALCIUM 7.3* 7.3* 7.6*        Phosphorus:  No results for input(s): PHOS in the last 72 hours. Magnesium:   Recent Labs     01/06/20  0614   MG 1.6     Albumin:   Recent Labs     01/06/20  0614   LABALBU 2.3*       IRON:    Lab Results   Component Value Date    IRON 26 10/27/2019     Iron Saturation:  No components found for: PERCENTFE  TIBC:    Lab Results   Component Value Date    TIBC 239 10/27/2019     FERRITIN:    Lab Results   Component Value Date    FERRITIN 50 10/27/2019     SPEP:   Lab Results   Component Value Date    PROT 5.5 01/06/2020    ALBCAL 3.4 10/27/2019    ALBPCT 56 10/27/2019    LABALPH 0.2 10/27/2019    LABALPH 0.8 10/27/2019    A1PCT 4 10/27/2019    A2PCT 13 10/27/2019    LABBETA 0.8 10/27/2019    BETAPCT 13 10/27/2019    GAMGLOB 0.9 10/27/2019    GGPCT 15 10/27/2019    PATH NOT REPORTED 10/27/2019     UPEP: No results found for: TPU     C3: No results found for: C3  C4: No results found for: C4  MPO ANCA:  No results found for: MPO .   PR3 ANCA:  No results found for: PR3  Urine Sodium:  No results found for: JOHN   Urine Potassium:  No results found for: KUR  Urine Chloride:  No components found for: CLU  Urine Ph:  No components found for: PO4U  Urine Osmolarity:  No results found for: OSMOU  Urine Creatinine:  No results found for: LABCREA  Urine Eosinophils: No components found for: EOSU  Urine Protein:  No results found for: TPU  Urinalysis:  U/A:   Lab Results   Component Value Date    NITRU NEGATIVE 01/04/2020    COLORU YELLOW 01/04/2020    PHUR 5.0 01/04/2020    WBCUA TOO NUMEROUS TO COUNT 01/04/2020    RBCUA 2 TO 5 01/04/2020    MUCUS NOT REPORTED 01/04/2020    TRICHOMONAS NOT REPORTED 01/04/2020    YEAST NOT REPORTED 01/04/2020    BACTERIA MANY 01/04/2020    SPECGRAV 1.005 01/04/2020    LEUKOCYTESUR LARGE 01/04/2020    UROBILINOGEN Normal 01/04/2020    BILIRUBINUR NEGATIVE 01/04/2020    GLUCOSEU NEGATIVE 01/04/2020    [address] NEGATIVE 01/04/2020    AMORPHOUS 2+ 01/04/2020         Radiology:  Reviewed as available. Assessment:  1. Acute kidney injury Nonoliguric secondary to prerenal azotemia after initially receiving diuretics  and recent ciprofloxacin use. I doubt acute interstitial nephritis. Serum creatinine has plateaued at 2.3 and gradually improving at 2.1 today-Will obtain a renal ultrasound and random bladder scan to rule out any retention Or obstructive uropathy      2. Hyperchloremic metabolic acidosis-Differentials include chloride-containing IVF which was discontinued/Acute kidney injury and diarrhea. Rule out renal tubular acidosis    3. Hypertensive urgency-uncontrolled-Patient refusing oral medications this morning  Rule out secondary causes-Abdominal CT scan November 2019 showed thickened adrenal glands suspicious for adrenal hyperplasia-Rule out Primary hyperaldosteronism and renal artery stenosis. 4. Hypernatremia secondary to decreased free water intake      4. Anemia       Plan:  1. Encourage  oral fluid intake-IV fluids have been discontinued  2. Obtain a renal ultrasound/Random bladder scan-Call for  greater than 300-3 50 mils of urine  3. Will Order serum and urine

## (undated) DEVICE — CANNULA NSL AD TBNG L7FT PVC STR NONFLARED PRNG O2 DEL W STD

## (undated) DEVICE — Z INACTIVE USE 2525529 CONNECTOR TBNG FOR O2

## (undated) DEVICE — DEFENDO AIR WATER SUCTION AND BIOPSY VALVE KIT FOR  OLYMPUS: Brand: DEFENDO AIR/WATER/SUCTION AND BIOPSY VALVE

## (undated) DEVICE — FORCEPS BX L240CM WRK CHN 2.8MM STD CAP W/ NDL MIC MESH

## (undated) DEVICE — BITEBLOCK 54FR W/ DENT RIM BLOX

## (undated) DEVICE — Z DUPLICATE USE 2527422 TUBING O2 STD 7 FT EXTN NO CRUSH VISUAL CNTRST LF

## (undated) DEVICE — GOWN,POLY REINFORCED,LG: Brand: MEDLINE

## (undated) DEVICE — JELLY,LUBE,STERILE,FLIP TOP,TUBE,2-OZ: Brand: MEDLINE

## (undated) DEVICE — Z DISCONTINUED USE 2424143 ADAPTER O2 SWVL CHRISTMAS TREE GRN